# Patient Record
Sex: FEMALE | Race: WHITE | NOT HISPANIC OR LATINO
[De-identification: names, ages, dates, MRNs, and addresses within clinical notes are randomized per-mention and may not be internally consistent; named-entity substitution may affect disease eponyms.]

---

## 2017-02-14 ENCOUNTER — APPOINTMENT (OUTPATIENT)
Dept: OBGYN | Facility: CLINIC | Age: 72
End: 2017-02-14

## 2017-03-20 ENCOUNTER — APPOINTMENT (OUTPATIENT)
Dept: SURGERY | Facility: CLINIC | Age: 72
End: 2017-03-20

## 2017-04-11 ENCOUNTER — OUTPATIENT (OUTPATIENT)
Dept: OUTPATIENT SERVICES | Facility: HOSPITAL | Age: 72
LOS: 1 days | End: 2017-04-11
Payer: MEDICARE

## 2017-04-11 VITALS
WEIGHT: 130.07 LBS | HEART RATE: 64 BPM | RESPIRATION RATE: 15 BRPM | OXYGEN SATURATION: 100 % | TEMPERATURE: 98 F | SYSTOLIC BLOOD PRESSURE: 144 MMHG | DIASTOLIC BLOOD PRESSURE: 84 MMHG | HEIGHT: 64 IN

## 2017-04-11 DIAGNOSIS — Z96.659 PRESENCE OF UNSPECIFIED ARTIFICIAL KNEE JOINT: Chronic | ICD-10-CM

## 2017-04-11 DIAGNOSIS — Z98.891 HISTORY OF UTERINE SCAR FROM PREVIOUS SURGERY: Chronic | ICD-10-CM

## 2017-04-11 DIAGNOSIS — Z98.49 CATARACT EXTRACTION STATUS, UNSPECIFIED EYE: Chronic | ICD-10-CM

## 2017-04-11 DIAGNOSIS — N94.9 UNSPECIFIED CONDITION ASSOCIATED WITH FEMALE GENITAL ORGANS AND MENSTRUAL CYCLE: ICD-10-CM

## 2017-04-11 DIAGNOSIS — Z01.818 ENCOUNTER FOR OTHER PREPROCEDURAL EXAMINATION: ICD-10-CM

## 2017-04-11 DIAGNOSIS — N83.209 UNSPECIFIED OVARIAN CYST, UNSPECIFIED SIDE: ICD-10-CM

## 2017-04-11 DIAGNOSIS — N84.0 POLYP OF CORPUS UTERI: ICD-10-CM

## 2017-04-11 DIAGNOSIS — Z98.890 OTHER SPECIFIED POSTPROCEDURAL STATES: Chronic | ICD-10-CM

## 2017-04-11 DIAGNOSIS — Z90.721 ACQUIRED ABSENCE OF OVARIES, UNILATERAL: Chronic | ICD-10-CM

## 2017-04-11 DIAGNOSIS — Z86.39 PERSONAL HISTORY OF OTHER ENDOCRINE, NUTRITIONAL AND METABOLIC DISEASE: Chronic | ICD-10-CM

## 2017-04-11 LAB
BLD GP AB SCN SERPL QL: NEGATIVE — SIGNIFICANT CHANGE UP
HCT VFR BLD CALC: 39.4 % — SIGNIFICANT CHANGE UP (ref 34.5–45)
HGB BLD-MCNC: 13.3 G/DL — SIGNIFICANT CHANGE UP (ref 11.5–15.5)
MCHC RBC-ENTMCNC: 29.3 PG — SIGNIFICANT CHANGE UP (ref 27–34)
MCHC RBC-ENTMCNC: 33.8 GM/DL — SIGNIFICANT CHANGE UP (ref 32–36)
MCV RBC AUTO: 86.8 FL — SIGNIFICANT CHANGE UP (ref 80–100)
PLATELET # BLD AUTO: 192 K/UL — SIGNIFICANT CHANGE UP (ref 150–400)
RBC # BLD: 4.54 M/UL — SIGNIFICANT CHANGE UP (ref 3.8–5.2)
RBC # FLD: 12.9 % — SIGNIFICANT CHANGE UP (ref 10.3–14.5)
RH IG SCN BLD-IMP: POSITIVE — SIGNIFICANT CHANGE UP
WBC # BLD: 3.5 K/UL — LOW (ref 3.8–10.5)
WBC # FLD AUTO: 3.5 K/UL — LOW (ref 3.8–10.5)

## 2017-04-11 PROCEDURE — G0463: CPT

## 2017-04-11 PROCEDURE — 85027 COMPLETE CBC AUTOMATED: CPT

## 2017-04-11 PROCEDURE — 86901 BLOOD TYPING SEROLOGIC RH(D): CPT

## 2017-04-11 PROCEDURE — 86850 RBC ANTIBODY SCREEN: CPT

## 2017-04-11 PROCEDURE — 86900 BLOOD TYPING SEROLOGIC ABO: CPT

## 2017-04-11 NOTE — H&P PST ADULT - ATTENDING COMMENTS
pt scheduled for laparoscopic left salpingo oophorectomy, D&C, diagnostic hysterectomy, polypectomy, possible surgical staging, possible exploratory laparotomy.

## 2017-04-11 NOTE — H&P PST ADULT - PROBLEM SELECTOR PLAN 1
Scheduled laparoscopic left salpingo oophorectomy, D&C, diagnostic hysterectomy, polypectomy, possible surgical staging, possible exploratory laparotomy.   Pre-emptive analgesia ordered

## 2017-04-11 NOTE — H&P PST ADULT - PSH
H/O thyroid nodule  s/p excision 1996  S/P cataract extraction  b/l  S/P hernia repair  years ago; b/l inguinal  S/P knee replacement  left 2014  S/P oophorectomy  right, in her 20s H/O thyroid nodule  s/p excision   S/P     S/P cataract extraction  b/l  S/P hernia repair  years ago; b/l inguinal  S/P knee replacement  left   S/P oophorectomy  right, in her 20s

## 2017-04-11 NOTE — H&P PST ADULT - HISTORY OF PRESENT ILLNESS
72 y/o female with h/o right ovarian cyst s/p right oophorectomy in her 20s. Pt states she now has a left ovarian cyst and a polyp which has recently grown in size and is experiencing abdominal bloating but no pain. Presents for laparoscopic left salpingo oophorectomy, D&C, diagnostic hysterectomy, polypectomy, possible surgical staging, possible exploratory laparotomy.

## 2017-04-11 NOTE — H&P PST ADULT - NSANTHOSAYNRD_GEN_A_CORE
No. JAMESON screening performed.  STOP BANG Legend: 0-2 = LOW Risk; 3-4 = INTERMEDIATE Risk; 5-8 = HIGH Risk/13 in

## 2017-04-11 NOTE — H&P PST ADULT - PMH
Adnexal cyst  b/l  Fall  s/p fall in 2013 with neck, pain and knee injury  Uterine polyp Adnexal cyst  b/l  Fall  s/p fall in 2013 with neck, pain and knee injury  Thyroid nodule    Uterine polyp Adnexal cyst  b/l  Anemia    Fall  s/p fall in 2013 with neck, pain and knee injury  Thyroid nodule    Uterine polyp

## 2017-04-20 ENCOUNTER — RESULT REVIEW (OUTPATIENT)
Age: 72
End: 2017-04-20

## 2017-04-21 ENCOUNTER — TRANSCRIPTION ENCOUNTER (OUTPATIENT)
Age: 72
End: 2017-04-21

## 2017-04-21 ENCOUNTER — OUTPATIENT (OUTPATIENT)
Dept: OUTPATIENT SERVICES | Facility: HOSPITAL | Age: 72
LOS: 1 days | End: 2017-04-21
Payer: MEDICARE

## 2017-04-21 ENCOUNTER — APPOINTMENT (OUTPATIENT)
Dept: OBGYN | Facility: HOSPITAL | Age: 72
End: 2017-04-21

## 2017-04-21 VITALS
HEART RATE: 58 BPM | OXYGEN SATURATION: 97 % | DIASTOLIC BLOOD PRESSURE: 60 MMHG | TEMPERATURE: 98 F | SYSTOLIC BLOOD PRESSURE: 135 MMHG | RESPIRATION RATE: 16 BRPM

## 2017-04-21 VITALS
SYSTOLIC BLOOD PRESSURE: 150 MMHG | RESPIRATION RATE: 15 BRPM | TEMPERATURE: 98 F | HEART RATE: 91 BPM | DIASTOLIC BLOOD PRESSURE: 68 MMHG | OXYGEN SATURATION: 100 %

## 2017-04-21 DIAGNOSIS — Z86.39 PERSONAL HISTORY OF OTHER ENDOCRINE, NUTRITIONAL AND METABOLIC DISEASE: Chronic | ICD-10-CM

## 2017-04-21 DIAGNOSIS — N83.209 UNSPECIFIED OVARIAN CYST, UNSPECIFIED SIDE: ICD-10-CM

## 2017-04-21 DIAGNOSIS — Z96.659 PRESENCE OF UNSPECIFIED ARTIFICIAL KNEE JOINT: Chronic | ICD-10-CM

## 2017-04-21 DIAGNOSIS — Z98.49 CATARACT EXTRACTION STATUS, UNSPECIFIED EYE: Chronic | ICD-10-CM

## 2017-04-21 DIAGNOSIS — Z98.890 OTHER SPECIFIED POSTPROCEDURAL STATES: Chronic | ICD-10-CM

## 2017-04-21 DIAGNOSIS — N84.0 POLYP OF CORPUS UTERI: ICD-10-CM

## 2017-04-21 DIAGNOSIS — Z98.891 HISTORY OF UTERINE SCAR FROM PREVIOUS SURGERY: Chronic | ICD-10-CM

## 2017-04-21 DIAGNOSIS — Z90.721 ACQUIRED ABSENCE OF OVARIES, UNILATERAL: Chronic | ICD-10-CM

## 2017-04-21 LAB — RH IG SCN BLD-IMP: POSITIVE — SIGNIFICANT CHANGE UP

## 2017-04-21 PROCEDURE — 88307 TISSUE EXAM BY PATHOLOGIST: CPT

## 2017-04-21 PROCEDURE — 58558 HYSTEROSCOPY BIOPSY: CPT

## 2017-04-21 PROCEDURE — 58661 LAPAROSCOPY REMOVE ADNEXA: CPT

## 2017-04-21 PROCEDURE — 88307 TISSUE EXAM BY PATHOLOGIST: CPT | Mod: 26

## 2017-04-21 PROCEDURE — 88112 CYTOPATH CELL ENHANCE TECH: CPT | Mod: 26

## 2017-04-21 PROCEDURE — 88331 PATH CONSLTJ SURG 1 BLK 1SPC: CPT

## 2017-04-21 PROCEDURE — 88305 TISSUE EXAM BY PATHOLOGIST: CPT

## 2017-04-21 PROCEDURE — 88112 CYTOPATH CELL ENHANCE TECH: CPT

## 2017-04-21 PROCEDURE — 58661 LAPAROSCOPY REMOVE ADNEXA: CPT | Mod: 80

## 2017-04-21 PROCEDURE — 88331 PATH CONSLTJ SURG 1 BLK 1SPC: CPT | Mod: 26

## 2017-04-21 PROCEDURE — 86901 BLOOD TYPING SEROLOGIC RH(D): CPT

## 2017-04-21 PROCEDURE — C1889: CPT

## 2017-04-21 PROCEDURE — 88305 TISSUE EXAM BY PATHOLOGIST: CPT | Mod: 26

## 2017-04-21 PROCEDURE — 86900 BLOOD TYPING SEROLOGIC ABO: CPT

## 2017-04-21 RX ORDER — SODIUM CHLORIDE 9 MG/ML
3 INJECTION INTRAMUSCULAR; INTRAVENOUS; SUBCUTANEOUS EVERY 8 HOURS
Qty: 0 | Refills: 0 | Status: DISCONTINUED | OUTPATIENT
Start: 2017-04-21 | End: 2017-04-21

## 2017-04-21 RX ORDER — HYDROMORPHONE HYDROCHLORIDE 2 MG/ML
1 INJECTION INTRAMUSCULAR; INTRAVENOUS; SUBCUTANEOUS
Qty: 0 | Refills: 0 | Status: DISCONTINUED | OUTPATIENT
Start: 2017-04-21 | End: 2017-04-21

## 2017-04-21 RX ORDER — SODIUM CHLORIDE 9 MG/ML
1000 INJECTION, SOLUTION INTRAVENOUS
Qty: 0 | Refills: 0 | Status: DISCONTINUED | OUTPATIENT
Start: 2017-04-21 | End: 2017-05-06

## 2017-04-21 RX ORDER — OXYCODONE HYDROCHLORIDE 5 MG/1
1 TABLET ORAL
Qty: 20 | Refills: 0 | OUTPATIENT
Start: 2017-04-21 | End: 2017-04-26

## 2017-04-21 RX ORDER — ACETAMINOPHEN 500 MG
975 TABLET ORAL ONCE
Qty: 0 | Refills: 0 | Status: COMPLETED | OUTPATIENT
Start: 2017-04-21 | End: 2017-04-21

## 2017-04-21 RX ORDER — ACETAMINOPHEN 500 MG
650 TABLET ORAL EVERY 6 HOURS
Qty: 0 | Refills: 0 | Status: DISCONTINUED | OUTPATIENT
Start: 2017-04-21 | End: 2017-05-06

## 2017-04-21 RX ORDER — ONDANSETRON 8 MG/1
4 TABLET, FILM COATED ORAL ONCE
Qty: 0 | Refills: 0 | Status: DISCONTINUED | OUTPATIENT
Start: 2017-04-21 | End: 2017-04-21

## 2017-04-21 RX ORDER — CIPROFLOXACIN LACTATE 400MG/40ML
400 VIAL (ML) INTRAVENOUS ONCE
Qty: 0 | Refills: 0 | Status: DISCONTINUED | OUTPATIENT
Start: 2017-04-21 | End: 2017-04-21

## 2017-04-21 RX ORDER — IBUPROFEN 200 MG
1 TABLET ORAL
Qty: 0 | Refills: 0 | COMMUNITY
Start: 2017-04-21

## 2017-04-21 RX ORDER — FAMOTIDINE 10 MG/ML
20 INJECTION INTRAVENOUS ONCE
Qty: 0 | Refills: 0 | Status: COMPLETED | OUTPATIENT
Start: 2017-04-21 | End: 2017-04-21

## 2017-04-21 RX ORDER — OXYCODONE HYDROCHLORIDE 5 MG/1
5 TABLET ORAL EVERY 4 HOURS
Qty: 0 | Refills: 0 | Status: DISCONTINUED | OUTPATIENT
Start: 2017-04-21 | End: 2017-04-21

## 2017-04-21 RX ORDER — CELECOXIB 200 MG/1
200 CAPSULE ORAL ONCE
Qty: 0 | Refills: 0 | Status: COMPLETED | OUTPATIENT
Start: 2017-04-21 | End: 2017-04-21

## 2017-04-21 RX ORDER — TRAMADOL HYDROCHLORIDE 50 MG/1
50 TABLET ORAL ONCE
Qty: 0 | Refills: 0 | Status: DISCONTINUED | OUTPATIENT
Start: 2017-04-21 | End: 2017-04-21

## 2017-04-21 RX ORDER — HYDROMORPHONE HYDROCHLORIDE 2 MG/ML
0.5 INJECTION INTRAMUSCULAR; INTRAVENOUS; SUBCUTANEOUS
Qty: 0 | Refills: 0 | Status: DISCONTINUED | OUTPATIENT
Start: 2017-04-21 | End: 2017-04-21

## 2017-04-21 RX ORDER — SIMETHICONE 80 MG/1
80 TABLET, CHEWABLE ORAL THREE TIMES A DAY
Qty: 0 | Refills: 0 | Status: DISCONTINUED | OUTPATIENT
Start: 2017-04-21 | End: 2017-05-06

## 2017-04-21 RX ORDER — IBUPROFEN 200 MG
600 TABLET ORAL EVERY 6 HOURS
Qty: 0 | Refills: 0 | Status: DISCONTINUED | OUTPATIENT
Start: 2017-04-21 | End: 2017-05-06

## 2017-04-21 RX ADMIN — SODIUM CHLORIDE 100 MILLILITER(S): 9 INJECTION, SOLUTION INTRAVENOUS at 11:19

## 2017-04-21 RX ADMIN — Medication 975 MILLIGRAM(S): at 07:08

## 2017-04-21 RX ADMIN — TRAMADOL HYDROCHLORIDE 50 MILLIGRAM(S): 50 TABLET ORAL at 07:09

## 2017-04-21 RX ADMIN — HYDROMORPHONE HYDROCHLORIDE 1 MILLIGRAM(S): 2 INJECTION INTRAMUSCULAR; INTRAVENOUS; SUBCUTANEOUS at 11:00

## 2017-04-21 RX ADMIN — CELECOXIB 200 MILLIGRAM(S): 200 CAPSULE ORAL at 07:09

## 2017-04-21 RX ADMIN — FAMOTIDINE 20 MILLIGRAM(S): 10 INJECTION INTRAVENOUS at 07:09

## 2017-04-21 RX ADMIN — HYDROMORPHONE HYDROCHLORIDE 1 MILLIGRAM(S): 2 INJECTION INTRAMUSCULAR; INTRAVENOUS; SUBCUTANEOUS at 11:15

## 2017-04-21 NOTE — BRIEF OPERATIVE NOTE - PROCEDURE
Dilation and curettage  04/21/2017    Active  AURA  Hysteroscopy with polypectomy of uterus  04/21/2017    Active  NBFRANCY  Salpingectomy, oophorectomy, or salpingo-oophorectomy, laparoscopic  04/21/2017    Active  VIVIANAO

## 2017-04-21 NOTE — BRIEF OPERATIVE NOTE - POST-OP DX
Cyst of left ovary  04/21/2017    Active  Yvonne Drummond  Polyp, uterus corpus  04/21/2017    Active  Yvonne Drummond

## 2017-04-21 NOTE — BRIEF OPERATIVE NOTE - OPERATION/FINDINGS
8cm left ovarian cyst.  right ovary not visualized.  grossly normal appearing uterus, bilateral fallopian tubes, liver, gallbladder.  Two 1 cm endometrial polyps on the lower posterior and lower anterior uterine wall.  normal ostia.  otherwise atrophic endomtrium. normal cervix.

## 2017-04-24 LAB — NON-GYNECOLOGICAL CYTOLOGY STUDY: SIGNIFICANT CHANGE UP

## 2017-04-27 LAB — SURGICAL PATHOLOGY STUDY: SIGNIFICANT CHANGE UP

## 2017-05-02 ENCOUNTER — APPOINTMENT (OUTPATIENT)
Dept: OBGYN | Facility: CLINIC | Age: 72
End: 2017-05-02

## 2017-05-31 ENCOUNTER — APPOINTMENT (OUTPATIENT)
Dept: OBGYN | Facility: CLINIC | Age: 72
End: 2017-05-31

## 2017-09-27 ENCOUNTER — APPOINTMENT (OUTPATIENT)
Dept: SURGERY | Facility: CLINIC | Age: 72
End: 2017-09-27
Payer: MEDICARE

## 2017-09-27 PROCEDURE — 99213 OFFICE O/P EST LOW 20 MIN: CPT

## 2018-03-14 ENCOUNTER — APPOINTMENT (OUTPATIENT)
Dept: SURGERY | Facility: CLINIC | Age: 73
End: 2018-03-14
Payer: MEDICARE

## 2018-03-14 PROCEDURE — 99213 OFFICE O/P EST LOW 20 MIN: CPT

## 2018-04-11 ENCOUNTER — APPOINTMENT (OUTPATIENT)
Dept: GASTROENTEROLOGY | Facility: CLINIC | Age: 73
End: 2018-04-11

## 2018-04-11 ENCOUNTER — INPATIENT (INPATIENT)
Facility: HOSPITAL | Age: 73
LOS: 1 days | Discharge: HOME | End: 2018-04-13
Attending: INTERNAL MEDICINE | Admitting: INTERNAL MEDICINE
Payer: MEDICARE

## 2018-04-11 VITALS
TEMPERATURE: 97 F | OXYGEN SATURATION: 100 % | SYSTOLIC BLOOD PRESSURE: 157 MMHG | DIASTOLIC BLOOD PRESSURE: 73 MMHG | RESPIRATION RATE: 18 BRPM | HEART RATE: 62 BPM

## 2018-04-11 DIAGNOSIS — Z96.659 PRESENCE OF UNSPECIFIED ARTIFICIAL KNEE JOINT: Chronic | ICD-10-CM

## 2018-04-11 DIAGNOSIS — Z98.49 CATARACT EXTRACTION STATUS, UNSPECIFIED EYE: Chronic | ICD-10-CM

## 2018-04-11 DIAGNOSIS — Z98.891 HISTORY OF UTERINE SCAR FROM PREVIOUS SURGERY: Chronic | ICD-10-CM

## 2018-04-11 DIAGNOSIS — Z86.39 PERSONAL HISTORY OF OTHER ENDOCRINE, NUTRITIONAL AND METABOLIC DISEASE: Chronic | ICD-10-CM

## 2018-04-11 DIAGNOSIS — Z90.721 ACQUIRED ABSENCE OF OVARIES, UNILATERAL: Chronic | ICD-10-CM

## 2018-04-11 DIAGNOSIS — I63.9 CEREBRAL INFARCTION, UNSPECIFIED: ICD-10-CM

## 2018-04-11 DIAGNOSIS — Z98.890 OTHER SPECIFIED POSTPROCEDURAL STATES: Chronic | ICD-10-CM

## 2018-04-11 LAB
ALBUMIN SERPL ELPH-MCNC: 4.8 G/DL — SIGNIFICANT CHANGE UP (ref 3.3–5)
ALP SERPL-CCNC: 81 U/L — SIGNIFICANT CHANGE UP (ref 40–120)
ALT FLD-CCNC: 18 U/L — SIGNIFICANT CHANGE UP (ref 4–33)
APTT BLD: 32.5 SEC — SIGNIFICANT CHANGE UP (ref 27.5–37.4)
AST SERPL-CCNC: 19 U/L — SIGNIFICANT CHANGE UP (ref 4–32)
BASE EXCESS BLDV CALC-SCNC: 4.1 MMOL/L — SIGNIFICANT CHANGE UP
BASOPHILS # BLD AUTO: 0.02 K/UL — SIGNIFICANT CHANGE UP (ref 0–0.2)
BASOPHILS NFR BLD AUTO: 0.4 % — SIGNIFICANT CHANGE UP (ref 0–2)
BILIRUB SERPL-MCNC: 0.3 MG/DL — SIGNIFICANT CHANGE UP (ref 0.2–1.2)
BLOOD GAS VENOUS - CREATININE: 0.64 MG/DL — SIGNIFICANT CHANGE UP (ref 0.5–1.3)
BUN SERPL-MCNC: 19 MG/DL — SIGNIFICANT CHANGE UP (ref 7–23)
CALCIUM SERPL-MCNC: 9.3 MG/DL — SIGNIFICANT CHANGE UP (ref 8.4–10.5)
CHLORIDE BLDV-SCNC: 110 MMOL/L — HIGH (ref 96–108)
CHLORIDE SERPL-SCNC: 104 MMOL/L — SIGNIFICANT CHANGE UP (ref 98–107)
CO2 SERPL-SCNC: 27 MMOL/L — SIGNIFICANT CHANGE UP (ref 22–31)
CREAT SERPL-MCNC: 0.67 MG/DL — SIGNIFICANT CHANGE UP (ref 0.5–1.3)
EOSINOPHIL # BLD AUTO: 0.06 K/UL — SIGNIFICANT CHANGE UP (ref 0–0.5)
EOSINOPHIL NFR BLD AUTO: 1.1 % — SIGNIFICANT CHANGE UP (ref 0–6)
GAS PNL BLDV: 139 MMOL/L — SIGNIFICANT CHANGE UP (ref 136–146)
GLUCOSE BLDV-MCNC: 103 — HIGH (ref 70–99)
GLUCOSE SERPL-MCNC: 92 MG/DL — SIGNIFICANT CHANGE UP (ref 70–99)
HCO3 BLDV-SCNC: 27 MMOL/L — SIGNIFICANT CHANGE UP (ref 20–27)
HCT VFR BLD CALC: 46.7 % — HIGH (ref 34.5–45)
HCT VFR BLDV CALC: 45.2 % — HIGH (ref 34.5–45)
HGB BLD-MCNC: 15.4 G/DL — SIGNIFICANT CHANGE UP (ref 11.5–15.5)
HGB BLDV-MCNC: 14.8 G/DL — SIGNIFICANT CHANGE UP (ref 11.5–15.5)
IMM GRANULOCYTES # BLD AUTO: 0.01 # — SIGNIFICANT CHANGE UP
IMM GRANULOCYTES NFR BLD AUTO: 0.2 % — SIGNIFICANT CHANGE UP (ref 0–1.5)
INR BLD: 0.94 — SIGNIFICANT CHANGE UP (ref 0.88–1.17)
LACTATE BLDV-MCNC: 1.1 MMOL/L — SIGNIFICANT CHANGE UP (ref 0.5–2)
LYMPHOCYTES # BLD AUTO: 1.06 K/UL — SIGNIFICANT CHANGE UP (ref 1–3.3)
LYMPHOCYTES # BLD AUTO: 19.1 % — SIGNIFICANT CHANGE UP (ref 13–44)
MCHC RBC-ENTMCNC: 29.3 PG — SIGNIFICANT CHANGE UP (ref 27–34)
MCHC RBC-ENTMCNC: 33 % — SIGNIFICANT CHANGE UP (ref 32–36)
MCV RBC AUTO: 89 FL — SIGNIFICANT CHANGE UP (ref 80–100)
MONOCYTES # BLD AUTO: 0.33 K/UL — SIGNIFICANT CHANGE UP (ref 0–0.9)
MONOCYTES NFR BLD AUTO: 5.9 % — SIGNIFICANT CHANGE UP (ref 2–14)
NEUTROPHILS # BLD AUTO: 4.08 K/UL — SIGNIFICANT CHANGE UP (ref 1.8–7.4)
NEUTROPHILS NFR BLD AUTO: 73.3 % — SIGNIFICANT CHANGE UP (ref 43–77)
NRBC # FLD: 0 — SIGNIFICANT CHANGE UP
PCO2 BLDV: 47 MMHG — SIGNIFICANT CHANGE UP (ref 41–51)
PH BLDV: 7.4 PH — SIGNIFICANT CHANGE UP (ref 7.32–7.43)
PLATELET # BLD AUTO: 207 K/UL — SIGNIFICANT CHANGE UP (ref 150–400)
PMV BLD: 10.2 FL — SIGNIFICANT CHANGE UP (ref 7–13)
PO2 BLDV: 44 MMHG — HIGH (ref 35–40)
POTASSIUM BLDV-SCNC: 3.6 MMOL/L — SIGNIFICANT CHANGE UP (ref 3.4–4.5)
POTASSIUM SERPL-MCNC: 3.8 MMOL/L — SIGNIFICANT CHANGE UP (ref 3.5–5.3)
POTASSIUM SERPL-SCNC: 3.8 MMOL/L — SIGNIFICANT CHANGE UP (ref 3.5–5.3)
PROT SERPL-MCNC: 7.8 G/DL — SIGNIFICANT CHANGE UP (ref 6–8.3)
PROTHROM AB SERPL-ACNC: 10.8 SEC — SIGNIFICANT CHANGE UP (ref 9.8–13.1)
RBC # BLD: 5.25 M/UL — HIGH (ref 3.8–5.2)
RBC # FLD: 12.5 % — SIGNIFICANT CHANGE UP (ref 10.3–14.5)
SAO2 % BLDV: 75.9 % — SIGNIFICANT CHANGE UP (ref 60–85)
SODIUM SERPL-SCNC: 144 MMOL/L — SIGNIFICANT CHANGE UP (ref 135–145)
TROPONIN T SERPL-MCNC: < 0.06 NG/ML — SIGNIFICANT CHANGE UP (ref 0–0.06)
TSH SERPL-MCNC: 4.03 UIU/ML — SIGNIFICANT CHANGE UP (ref 0.27–4.2)
WBC # BLD: 5.56 K/UL — SIGNIFICANT CHANGE UP (ref 3.8–10.5)
WBC # FLD AUTO: 5.56 K/UL — SIGNIFICANT CHANGE UP (ref 3.8–10.5)

## 2018-04-11 PROCEDURE — 70496 CT ANGIOGRAPHY HEAD: CPT | Mod: 26

## 2018-04-11 PROCEDURE — 70498 CT ANGIOGRAPHY NECK: CPT | Mod: 26

## 2018-04-11 PROCEDURE — 70450 CT HEAD/BRAIN W/O DYE: CPT | Mod: 26,59

## 2018-04-11 PROCEDURE — 99291 CRITICAL CARE FIRST HOUR: CPT

## 2018-04-11 RX ORDER — ALTEPLASE 100 MG
5.5 KIT INTRAVENOUS ONCE
Qty: 0 | Refills: 0 | Status: COMPLETED | OUTPATIENT
Start: 2018-04-11 | End: 2018-04-11

## 2018-04-11 RX ORDER — ACETAMINOPHEN 500 MG
650 TABLET ORAL
Qty: 0 | Refills: 0 | COMMUNITY

## 2018-04-11 RX ORDER — ALTEPLASE 100 MG
49.5 KIT INTRAVENOUS ONCE
Qty: 0 | Refills: 0 | Status: COMPLETED | OUTPATIENT
Start: 2018-04-11 | End: 2018-04-11

## 2018-04-11 RX ORDER — ATORVASTATIN CALCIUM 80 MG/1
80 TABLET, FILM COATED ORAL AT BEDTIME
Qty: 0 | Refills: 0 | Status: DISCONTINUED | OUTPATIENT
Start: 2018-04-11 | End: 2018-04-13

## 2018-04-11 RX ORDER — CHOLECALCIFEROL (VITAMIN D3) 125 MCG
1000 CAPSULE ORAL DAILY
Qty: 0 | Refills: 0 | Status: DISCONTINUED | OUTPATIENT
Start: 2018-04-11 | End: 2018-04-13

## 2018-04-11 RX ORDER — INFLUENZA VIRUS VACCINE 15; 15; 15; 15 UG/.5ML; UG/.5ML; UG/.5ML; UG/.5ML
0.5 SUSPENSION INTRAMUSCULAR ONCE
Qty: 0 | Refills: 0 | Status: DISCONTINUED | OUTPATIENT
Start: 2018-04-11 | End: 2018-04-13

## 2018-04-11 RX ADMIN — ALTEPLASE 330 MILLIGRAM(S): KIT at 12:15

## 2018-04-11 RX ADMIN — ALTEPLASE 49.5 MILLIGRAM(S): KIT at 12:17

## 2018-04-11 NOTE — ED ADULT TRIAGE NOTE - CHIEF COMPLAINT QUOTE
Pt had an episode of amnesia since 9:30am--pt cant recall anything after 9:30. Pt moving all extremetes --pt doesn't know month or day  Pt evaluated by Dr Chambers--stroke code called

## 2018-04-11 NOTE — H&P ADULT - NEGATIVE GASTROINTESTINAL SYMPTOMS
no constipation/no abdominal pain/no change in bowel habits/no flatulence/no nausea/no vomiting/no diarrhea

## 2018-04-11 NOTE — H&P ADULT - ASSESSMENT
72 F with no significant PMH presented to ED with c/o of acute onset of memory problem, w possible ischemic stroke    Neuro:  -pt had acute onset of memory loss. Per joshua, pt had pronator drift; concerned for ischemic stroke (CT head negative for hemorrhagic stroke)  -s/p tpa at 12:15 pm  -neuro q1h for 24 h s/p tpa  CTA head and neck   -repeat CT head 24 post tpa  -asa and DVT ppx 24 hr post stroke  -on atorvastain 80mg  -will obtain HbgA1c, lipid profile  -will also access for other etiologies of memory loss, TSH, syphilis HIV, folate, B12  -currently neurologically intact     Respiratory  -no issues    Cardiovascular  -on atorvastain 80mg for stroke    GI  -currently will be NPO 6 hours post tpa, then will access for a bedside dysphagia screen, and if no abnormalities, will start diet    ID  -no issues    Heme  -no issues    Endo  -will screen for diabetes w HbgA1c in setting of possible stroke    Skin  -no issues    DVT ppx  -currently on compression strokings until CT head 24 hours tpa is confirmed to be negative for intracranial hemorrhage     Joseph Prater, PGY3  [id number] 72 F with no significant PMH presented to ED with c/o of acute onset of memory problem, w possible ischemic stroke    Neuro:  -pt had acute onset of memory loss. Per joshua, pt had pronator drift; concerned for ischemic stroke (CT head negative for hemorrhagic stroke)  -s/p tpa at 12:15 pm  -neuro q1h for 24 h s/p tpa  CTA head and neck   -repeat CT head 24 post tpa  -will have to access if TKR of L knee is compatible w MRI   -asa and DVT ppx 24 hr post stroke  -on atorvastain 80mg  -will obtain HbgA1c, lipid profile  -will also access for other etiologies of memory loss, TSH, syphilis HIV, folate, B12  -currently neurologically intact     Respiratory  -no issues    Cardiovascular  -permissive HTN of <180/105 given possible stroke   -on atorvastain 80mg for stroke  -TTE w bubble study for stroke eval     GI  -currently will be NPO 6 hours post tpa, then will access for a bedside dysphagia screen, and if no abnormalities, will start diet    ID  -no issues    Heme  -no issues    Endo  -will screen for diabetes w HbgA1c in setting of possible stroke    Skin  -no issues    DVT ppx  -currently on compression strokings until CT head 24 hours tpa is confirmed to be negative for intracranial hemorrhage     Joseph Prater, PGY3  [id number] 72 F with no significant PMH presented to ED with c/o of acute onset of memory problem, w possible ischemic stroke    Neuro:  -pt had acute onset of memory loss. Per joshua, pt had pronator drift; concerned for ischemic stroke (CT head negative for hemorrhagic stroke)  -s/p tpa at 12:15 pm  -neuro q1h for 24 h s/p tpa  CTA head and neck   -repeat CT head 24 post tpa  -will have to access if TKR of L knee is compatible w MRI   -asa and DVT ppx 24 hr post stroke  -on atorvastain 80mg  -will obtain HbgA1c, lipid profile  -will also access for other etiologies of memory loss, TSH, syphilis HIV, folate, B12  -currently neurologically intact     Respiratory  -no issues    Cardiovascular  -permissive HTN of <180/105 given possible stroke   -on atorvastain 80mg for stroke  -TTE w bubble study for stroke eval     GI  -currently will be NPO 12 hours post tpa, then will access for a bedside dysphagia screen, and if no abnormalities, will start diet    ID  -no issues    Heme  -no issues    Endo  -will screen for diabetes w HbgA1c in setting of possible stroke    Skin  -no issues    DVT ppx  -currently on compression strokings until CT head 24 hours tpa is confirmed to be negative for intracranial hemorrhage     Joseph Prater, PGY3  [id number]

## 2018-04-11 NOTE — ED PROVIDER NOTE - PMH
Adnexal cyst  b/l  Anemia    Fall  s/p fall in 2013 with neck, pain and knee injury  Thyroid nodule    Uterine polyp

## 2018-04-11 NOTE — H&P ADULT - ATTENDING COMMENTS
Agree with above. Seen and examined with residents. Given TPA for possible stroke. Admit to MICU and watch closely with neuro checks.

## 2018-04-11 NOTE — H&P ADULT - NEGATIVE CARDIOVASCULAR SYMPTOMS
no peripheral edema/no orthopnea/no palpitations/no claudication/no chest pain/no paroxysmal nocturnal dyspnea/no dyspnea on exertion

## 2018-04-11 NOTE — ED ADULT NURSE NOTE - OBJECTIVE STATEMENT
Patient is a 72 year old female who presents with an episode of amnesia after 9:30 a.m. Pt is awake and alert. No c/o pain. Pt was having breakfast this morning when she couldn't remember what she had done the day prior. Her family brought her to the E.R. and a stroke code was called. CT scan was completed. 20g PIV x 2 placed in right and left A/C. Neurology at bedside evaluating for tPa administration.

## 2018-04-11 NOTE — H&P ADULT - HISTORY OF PRESENT ILLNESS
72 F with no significant PMH presented to ED with c/o of acute onset of memory problem. As per family member, her last seen normal was 9:30 am. Pt at her baseline unitl 9:30 am, then pt was asking about items that she could not remember were purchased yesterday. Pt also did not remember all of yesterday's event.  Family members denied any h/a, SOB, palpitations, fevers, chills, focal weakness, focal sensory loss. She was taken to the ED.     In ED, pt was evaluate by the stroke team, found to have slight pronator drift on R side, given tPa at 12:15 pm 72 F with pmhx of coronary spasms, HTN no longer tx, present with c/o of acute onset of memory problem. As per family member, her last seen normal was 9:30 am. Pt at her baseline unitl 9:30 am, then pt was asking about items that she could not remember were purchased yesterday. Pt also did not remember all of yesterday's event.  Family members denied any h/a, SOB, palpitations, fevers, chills, focal weakness, focal sensory loss. She was taken to the ED.     In ED, pt was evaluate by the stroke team, found to have slight pronator drift on R side, given tPa at 12:15 pm

## 2018-04-11 NOTE — ED PROVIDER NOTE - PSH
H/O thyroid nodule  s/p excision   S/P     S/P cataract extraction  b/l  S/P hernia repair  years ago; b/l inguinal  S/P knee replacement  left   S/P oophorectomy  right, in her 20s

## 2018-04-11 NOTE — ED PROVIDER NOTE - ATTENDING CONTRIBUTION TO CARE
ARIES Attending Note - Dr. Sanchez  73 yo female w/o pmhx bib for confusion associated with weakness.  PE: pt is alert and oriented x2, perrl, ent normal, membranes are moist, neck supple. no lymphadenopathy or thyroid enlargement, No JVD.  Chest clear to P&A, Heart- reg rhythm without murmur, rubs or gallops, radial pulses equal bilaterally.  Abd is soft, non-tender, Bowel sounds are active. no mass or organomegaly. : No CVA tenderness. Neuro:  Pt alert and oriented x 3. Perrl    Distal neurosensory is intact. Motor function is 5/5 strength bilaterally.  No focal deficits. Extremities:  No edema.  Skin: warm and dry.  Plan:  CT, labs with CBC, CMP, Neurology consult, CT scan of head   Impression:   CVA  Pt was given TPA.

## 2018-04-11 NOTE — CONSULT NOTE ADULT - ASSESSMENT
72  YF with no significant PMH presented to ED with c/o of acute onset of memory problem. Her neurological examination was remarkable for weakness in RUE and difficulty with making new memory. CT head was unremarkable.     Impression     acute stroke symptoms, s/p tPA     Plan     Permissive HTN x 24hrs goal 180/105  MRI brain w/o cont  CTA head and neck (would be done in ED before sending patient)   HgA1c  Lipid profile  Atorvastatin 80 mg   PT/OT   Repeat CT head after 24 hours  start aspirin and DVT prophylaxis only if repeat CT head is stable with no bleeding   TTE with bubble study   Telemetry monitoring  speech and swallow eval

## 2018-04-11 NOTE — ED PROVIDER NOTE - OBJECTIVE STATEMENT
73 yo female w/o pmhx bib for confusion. Per the boyfriend at bedside the patient woke up at 7am w/o complaints and had breakfast. around 9am the patient was asking the boyfriend of what she did yesterday as well as what she had for breakfast which is substantially abnormal for her. The patient at times does not remember exactly what she did yesterday but during our discussion has periods of memory. She denies any current weakness, headache, vision changes, dizziness, chest pain, abdominal pain, n/v/d. Currently takes vit d3 and used to take asa 81mg but has not recently. No other meds, supplements, etc.

## 2018-04-11 NOTE — H&P ADULT - NSHPLABSRESULTS_GEN_ALL_CORE
Labs nothing significant.    EKG had 1st degree heart block, DC in the 270s, and left axis deviation

## 2018-04-11 NOTE — CONSULT NOTE ADULT - ATTENDING COMMENTS
I have seen and examined this patient with the stroke neurology team.     History was reviewed with the patient and/or available family members.   ROS: All negative except documented in the HPI.   Neurological exam was performed and agree with exam as documented above.   Laboratory results and imaging studies were reviewed by me.   I agree with the neurology resident note as documented above.    71 Y/O R handed woman with vascular risk factor of age is seen in the [facility] for acute onset of memory disturbance. She was reported to be normal until 9:30 AM when she was having breakfast. Soon after that, she was noted to have memory difficulties described as remembering the events from yesterday and this morning. She was brought to [facility] for further evaluation. I have seen and examined this patient with the stroke neurology team.     History was reviewed with the patient and/or available family members.   ROS: All negative except documented in the HPI.   Neurological exam was performed and agree with exam as documented above.   Laboratory results and imaging studies were reviewed by me.   I agree with the neurology resident note as documented above.    73 Y/O R handed woman with vascular risk factor of age is seen in the [facility] for acute onset of memory disturbance. She was reported to be normal until 9:30 AM when she was having breakfast. Soon after that, she was noted to have memory difficulties described as remembering the events from yesterday and this morning. She was brought to [facility] for further evaluation. Neurological exam today shows memory loss involving anterograde as well as some retrograde, right nasolabial flattening and RUE mild weakness (RUE pronator drift, 5-/5). CT brain on admission showed mild to moderate leukoaraiosis but did not show any evidence of acute infarct or hemorrhage.     Impression:  Cerebral embolism with cerebral infarction. Left PCA distribution stroke likely leading to thalamic/thalamocapsular dysfunction – likely etiology being small vessel disease but possibility of cerebral embolism needs to be ruled out    Plan:   - Risks, benefits and adverse reactions associated with IV tPA including hemorrhagic stroke were discussed with patient and available family members in detail. After ruling out all the contraindications and obtaining verbal consent, patient would be treated with IV tPA  - Continue with  24 hours post IV tPA precautions including BP goal<185/110 mmHg before the tPA bolus administration and <180/105 mmHg after tPA bolus for first 24 hours followed by gradual normotension as per protocol  - Not a candidate for neurosurgical intervention due to low NIHSS   - CTA head and neck STAT to evaluate for intracranial and extracranial cerebral circulation  - MRI brain without contrast   - Aspirin and pharmacological DVT prophylaxis to be considered at 24 hours after the IV tPA based on repeating brain imaging, SCDs for DVT prophylaxis in the interim  - Atorvastatin 80 mg at bedtime after establishing enteral access or passing swallow evaluation; further dose titration as per LDL results  - HbA1C and LDL, continue with aggressive vascular risk factors modifications   - TTE with bubble study and telemetry to screen for possible cardiac source of embolism  - Prolonged cardiac monitoring to be considered based on results of above mentioned tests  - PT/OT/Speech and swallow evaluation     Above mentioned plan was discussed with patient and available family member in detail. All the questions were answered and concerns were addressed. More than 86 minutes were spent in evaluation and management of this critically ill and unstable patient with an acute stroke.

## 2018-04-11 NOTE — H&P ADULT - NEGATIVE NEUROLOGICAL SYMPTOMS
no focal seizures/no paresthesias/no facial palsy/no generalized seizures/no syncope/no tremors/no vertigo/no loss of sensation/no difficulty walking

## 2018-04-11 NOTE — ED ADULT NURSE REASSESSMENT NOTE - NS ED NURSE REASSESS COMMENT FT1
Mobile Critical Care RN at bedside. Dysphagia screen not done. As per M.D. order patient is NPO for the first 12 hours after tpa administration.

## 2018-04-11 NOTE — ED PROVIDER NOTE - MEDICAL DECISION MAKING DETAILS
71 yo female w/ concerns for possible CVA based on amnesic symptoms. appreciate right sided pronator drift, right mild loss of faciolabal fold and patient continues to maintain amnesic symptoms here (does not remember the neurologist after 20min discussion). EKG shows NSR at 69.   Discussed with neuro and family - decided to give TPA.

## 2018-04-12 ENCOUNTER — TRANSCRIPTION ENCOUNTER (OUTPATIENT)
Age: 73
End: 2018-04-12

## 2018-04-12 LAB
BUN SERPL-MCNC: 18 MG/DL — SIGNIFICANT CHANGE UP (ref 7–23)
CALCIUM SERPL-MCNC: 9 MG/DL — SIGNIFICANT CHANGE UP (ref 8.4–10.5)
CHLORIDE SERPL-SCNC: 103 MMOL/L — SIGNIFICANT CHANGE UP (ref 98–107)
CHOLEST SERPL-MCNC: 206 MG/DL — HIGH (ref 120–199)
CO2 SERPL-SCNC: 28 MMOL/L — SIGNIFICANT CHANGE UP (ref 22–31)
CREAT SERPL-MCNC: 0.67 MG/DL — SIGNIFICANT CHANGE UP (ref 0.5–1.3)
GLUCOSE SERPL-MCNC: 106 MG/DL — HIGH (ref 70–99)
HBA1C BLD-MCNC: 5.3 % — SIGNIFICANT CHANGE UP (ref 4–5.6)
HCT VFR BLD CALC: 42.2 % — SIGNIFICANT CHANGE UP (ref 34.5–45)
HDLC SERPL-MCNC: 53 MG/DL — SIGNIFICANT CHANGE UP (ref 45–65)
HGB BLD-MCNC: 13.7 G/DL — SIGNIFICANT CHANGE UP (ref 11.5–15.5)
LIPID PNL WITH DIRECT LDL SERPL: 140 MG/DL — SIGNIFICANT CHANGE UP
MAGNESIUM SERPL-MCNC: 2.2 MG/DL — SIGNIFICANT CHANGE UP (ref 1.6–2.6)
MCHC RBC-ENTMCNC: 29.2 PG — SIGNIFICANT CHANGE UP (ref 27–34)
MCHC RBC-ENTMCNC: 32.5 % — SIGNIFICANT CHANGE UP (ref 32–36)
MCV RBC AUTO: 90 FL — SIGNIFICANT CHANGE UP (ref 80–100)
NRBC # FLD: 0 — SIGNIFICANT CHANGE UP
PHOSPHATE SERPL-MCNC: 3 MG/DL — SIGNIFICANT CHANGE UP (ref 2.5–4.5)
PLATELET # BLD AUTO: 204 K/UL — SIGNIFICANT CHANGE UP (ref 150–400)
PMV BLD: 10.2 FL — SIGNIFICANT CHANGE UP (ref 7–13)
POTASSIUM SERPL-MCNC: 3.1 MMOL/L — LOW (ref 3.5–5.3)
POTASSIUM SERPL-SCNC: 3.1 MMOL/L — LOW (ref 3.5–5.3)
RBC # BLD: 4.69 M/UL — SIGNIFICANT CHANGE UP (ref 3.8–5.2)
RBC # FLD: 12.7 % — SIGNIFICANT CHANGE UP (ref 10.3–14.5)
SODIUM SERPL-SCNC: 142 MMOL/L — SIGNIFICANT CHANGE UP (ref 135–145)
TRIGL SERPL-MCNC: 70 MG/DL — SIGNIFICANT CHANGE UP (ref 10–149)
TSH SERPL-MCNC: 3.06 UIU/ML — SIGNIFICANT CHANGE UP (ref 0.27–4.2)
VIT B12 SERPL-MCNC: 372 PG/ML — SIGNIFICANT CHANGE UP (ref 200–900)
WBC # BLD: 4.24 K/UL — SIGNIFICANT CHANGE UP (ref 3.8–10.5)
WBC # FLD AUTO: 4.24 K/UL — SIGNIFICANT CHANGE UP (ref 3.8–10.5)

## 2018-04-12 PROCEDURE — 70450 CT HEAD/BRAIN W/O DYE: CPT | Mod: 26

## 2018-04-12 PROCEDURE — 99291 CRITICAL CARE FIRST HOUR: CPT

## 2018-04-12 RX ORDER — POTASSIUM CHLORIDE 20 MEQ
40 PACKET (EA) ORAL EVERY 4 HOURS
Qty: 0 | Refills: 0 | Status: DISCONTINUED | OUTPATIENT
Start: 2018-04-12 | End: 2018-04-12

## 2018-04-12 RX ORDER — ENOXAPARIN SODIUM 100 MG/ML
40 INJECTION SUBCUTANEOUS DAILY
Qty: 0 | Refills: 0 | Status: DISCONTINUED | OUTPATIENT
Start: 2018-04-12 | End: 2018-04-13

## 2018-04-12 RX ORDER — POTASSIUM CHLORIDE 20 MEQ
40 PACKET (EA) ORAL ONCE
Qty: 0 | Refills: 0 | Status: COMPLETED | OUTPATIENT
Start: 2018-04-12 | End: 2018-04-12

## 2018-04-12 RX ORDER — ASPIRIN/CALCIUM CARB/MAGNESIUM 324 MG
81 TABLET ORAL DAILY
Qty: 0 | Refills: 0 | Status: DISCONTINUED | OUTPATIENT
Start: 2018-04-12 | End: 2018-04-13

## 2018-04-12 RX ADMIN — ENOXAPARIN SODIUM 40 MILLIGRAM(S): 100 INJECTION SUBCUTANEOUS at 14:25

## 2018-04-12 RX ADMIN — Medication 1000 UNIT(S): at 12:11

## 2018-04-12 RX ADMIN — Medication 40 MILLIEQUIVALENT(S): at 05:33

## 2018-04-12 RX ADMIN — Medication 81 MILLIGRAM(S): at 14:25

## 2018-04-12 NOTE — DISCHARGE NOTE ADULT - HOSPITAL COURSE
72 F with pmhx of coronary spasms, HTN no longer tx, present with c/o of acute onset of memory problem. As per family member, her last seen normal was 9:30 am. Pt at her baseline unitl 9:30 am, then pt was asking about items that she could not remember were purchased yesterday. Pt also did not remember all of yesterday's event.  Family members denied any h/a, SOB, palpitations, fevers, chills, focal weakness, focal sensory loss. She was taken to the ED.     In ED, pt was evaluate by the stroke team, found to have slight pronator drift on R side, given tPa at 12:15 pm   CT with and without contrast normal  memory has returned to normal 72 F with pmhx of coronary spasms, HTN no longer tx, present with c/o of acute onset of memory problem. As per family member, her last seen normal was 9:30 am. Pt at her baseline unitl 9:30 am, then pt was asking about items that she could not remember were purchased yesterday. Pt also did not remember all of yesterday's event.  Family members denied any h/a, SOB, palpitations, fevers, chills, focal weakness, focal sensory loss. She was taken to the ED.     In ED, pt was evaluate by the stroke team, found to have slight pronator drift on R side, given tPa at 12:15 pm   CT with and without contrast normal and memory has returned to normal.  She was seen by private neurologist Dr. Hand who thought that the presentation was likely TGA.  MRI of brain without contrast was ordered but can be done as outpatient.  Will discuss with Dr. Batista if can DC 4/13 if MRI cannot be done and patient can have it outpatient. 72 F with pmhx of coronary spasms, HTN no longer tx, present with c/o of acute onset of memory problem. As per family member, her last seen normal was 9:30 am. Pt at her baseline unitl 9:30 am, then pt was asking about items that she could not remember were purchased yesterday. Pt also did not remember all of yesterday's event.  Family members denied any h/a, SOB, palpitations, fevers, chills, focal weakness, focal sensory loss. She was taken to the ED.     In ED, pt was evaluate by the stroke team, found to have slight pronator drift on R side, given tPa at 12:15 pm   CT with and without contrast normal and memory has returned to normal.  She was seen by private neurologist Dr. Hand who thought that the presentation was likely TGA.  MRI of brain without contrast was ordered but can be done as outpatient.   4/13 Echo: CONCLUSIONS:  1. Moderate concentric left ventricular hypertrophy.  2. Normal left ventricular systolic function. No segmental  wall motion abnormalities.  3. Mild diastolic dysfunction (Stage I).  4. Normal right ventricular size and function.  4/13 Treponema Pallidum NEG  4/13 MRI brain: IMPRESSION:    Moderate microvascular ischemic change.  No evidence of intracranial hemorrhage or acute cerebral ischemia.

## 2018-04-12 NOTE — PROGRESS NOTE ADULT - ASSESSMENT
73 Y/O R handed woman with vascular risk factor of age is seen in the [facility] for acute onset of memory disturbance. She was reported to be normal until 9:30 AM when she was having breakfast. Soon after that, she was noted to have memory difficulties described as remembering the events from yesterday and this morning. She was brought to [facility] for further evaluation. Initial neurological exam today shows memory loss involving anterograde as well as some retrograde, right nasolabial flattening and RUE mild weakness (RUE pronator drift, 5-/5). CT brain on admission showed mild to moderate leukoaraiosis but did not show any evidence of acute infarct or hemorrhage. She is s/p tPA on 4/11/18, in MICU for post-tPA protocol. Was not a candidate for neurosurgical intervention due to low NIHSS. Currently patient is full strength on the R side with a gap in her memory from time of event but remembers everything else now and possible very mild expressive aphasia.     Impression:  Cerebral embolism with cerebral infarction. Left PCA distribution stroke likely leading to thalamic/thalamocapsular dysfunction – likely etiology being small vessel disease but possibility of cerebral embolism needs to be ruled out    Plan:   - Continue with  24 hours post IV tPA precautions <180/105 mmHg after tPA bolus for first 24 hours followed by gradual normotension as per protocol  - CTA head and neck STAT to evaluate for intracranial and extracranial cerebral circulation  - MRI brain without contrast   - Aspirin and pharmacological DVT prophylaxis to be considered at 24 hours after the IV tPA based on repeating brain imaging, SCDs for DVT prophylaxis in the interim  -. Atorvastatin 80 mg at bedtime.  -A1c 5.3  - continue with aggressive vascular risk factors modifications   - TTE with bubble study and telemetry to screen for possible cardiac source of embolism  - Prolonged cardiac monitoring to be considered based on results of above mentioned tests  - PT/OT/Speech and swallow evaluation Assessment:  71 Y/O R handed woman with vascular risk factor of age is seen in the [facility] for acute onset of memory disturbance. She was reported to be normal until 9:30 AM when she was having breakfast. Soon after that, she was noted to have memory difficulties described as remembering the events from yesterday and this morning. She was brought to [facility] for further evaluation. Initial neurological exam today shows memory loss involving anterograde as well as some retrograde, right nasolabial flattening and RUE mild weakness (RUE pronator drift, 5-/5). CT brain on admission showed mild to moderate leukoaraiosis but did not show any evidence of acute infarct or hemorrhage. She is s/p tPA on 4/11/18, in MICU for post-tPA protocol. Was not a candidate for neurosurgical intervention due to low NIHSS. Currently patient is full strength on the R side with a gap in her memory from time of event but remembers everything else now and possible very subtle expressive aphasia.     Impression:  Cerebral embolism with cerebral infarction. Left PCA distribution stroke likely leading to thalamic/thalamocapsular dysfunction – likely etiology being small vessel disease but possibility of cerebral embolism needs to be ruled out    Plan:   - Continue with  24 hours post IV tPA precautions <180/105 mmHg after tPA bolus for first 24 hours followed by gradual normotension as per protocol  - MRI brain without contrast   - Aspirin and pharmacological DVT prophylaxis to be considered at 24 hours after the IV tPA based on repeating brain imaging, SCDs for DVT prophylaxis in the interim  -. Atorvastatin 80 mg at bedtime.  - A1c 5.3  - Continue with aggressive vascular risk factors modifications   - TTE with bubble study and telemetry to screen for possible cardiac source of embolism  - Prolonged cardiac monitoring to be considered based on results of above mentioned tests  - PT/OT/Speech and swallow evaluation     Above mentioned plan was discussed with patient and family member at bedside in detail. All the questions were answered and concerns were addressed.

## 2018-04-12 NOTE — PROGRESS NOTE ADULT - SUBJECTIVE AND OBJECTIVE BOX
CHIEF COMPLAINT:    Interval Events:    REVIEW OF SYSTEMS:  Constitutional: [ ] negative [ ] fevers [ ] chills [ ] weight loss [ ] weight gain  HEENT: [ ] negative [ ] dry eyes [ ] eye irritation [ ] postnasal drip [ ] nasal congestion  CV: [ ] negative  [ ] chest pain [ ] orthopnea [ ] palpitations [ ] murmur  Resp: [ ] negative [ ] cough [ ] shortness of breath [ ] dyspnea [ ] wheezing [ ] sputum [ ] hemoptysis  GI: [ ] negative [ ] nausea [ ] vomiting [ ] diarrhea [ ] constipation [ ] abd pain [ ] dysphagia   : [ ] negative [ ] dysuria [ ] nocturia [ ] hematuria [ ] increased urinary frequency  Musculoskeletal: [ ] negative [ ] back pain [ ] myalgias [ ] arthralgias [ ] fracture  Skin: [ ] negative [ ] rash [ ] itch  Neurological: [ ] negative [ ] headache [ ] dizziness [ ] syncope [ ] weakness [ ] numbness  Psychiatric: [ ] negative [ ] anxiety [ ] depression  Endocrine: [ ] negative [ ] diabetes [ ] thyroid problem  Hematologic/Lymphatic: [ ] negative [ ] anemia [ ] bleeding problem  Allergic/Immunologic: [ ] negative [ ] itchy eyes [ ] nasal discharge [ ] hives [ ] angioedema  [ ] All other systems negative  [ ] Unable to assess ROS because ________    OBJECTIVE:  ICU Vital Signs Last 24 Hrs  T(C): 35.7 (12 Apr 2018 04:00), Max: 36.4 (11 Apr 2018 12:10)  T(F): 96.3 (12 Apr 2018 04:00), Max: 97.5 (11 Apr 2018 12:10)  HR: 65 (12 Apr 2018 06:00) (18 - 75)  BP: 135/63 (12 Apr 2018 06:00) (104/80 - 178/113)  BP(mean): 82 (12 Apr 2018 06:00) (65 - 128)  ABP: --  ABP(mean): --  RR: 18 (12 Apr 2018 06:00) (13 - 23)  SpO2: 98% (12 Apr 2018 06:00) (95% - 100%)        04-11 @ 07:01  -  04-12 @ 07:00  --------------------------------------------------------  IN: 0 mL / OUT: 1000 mL / NET: -1000 mL      CAPILLARY BLOOD GLUCOSE  97 (11 Apr 2018 12:06)      POCT Blood Glucose.: 97 mg/dL (11 Apr 2018 11:23)      PHYSICAL EXAM:  General:   HEENT:   Lymph Nodes:  Neck:   Respiratory:   Cardiovascular:   Abdomen:   Extremities:   Skin:   Neurological:  Psychiatry:    LINES:    HOSPITAL MEDICATIONS:  Standing Meds:  atorvastatin 80 milliGRAM(s) Oral at bedtime  cholecalciferol 1000 Unit(s) Oral daily  influenza   Vaccine 0.5 milliLiter(s) IntraMuscular once      PRN Meds:      LABS:                        13.7   4.24  )-----------( 204      ( 12 Apr 2018 03:20 )             42.2     Hgb Trend: 13.7<--, 15.4<--  04-12    142  |  103  |  18  ----------------------------<  106<H>  3.1<L>   |  28  |  0.67    Ca    9.0      12 Apr 2018 03:20  Phos  3.0     04-12  Mg     2.2     04-12    TPro  7.8  /  Alb  4.8  /  TBili  0.3  /  DBili  x   /  AST  19  /  ALT  18  /  AlkPhos  81  04-11    Creatinine Trend: 0.67<--, 0.67<--  PT/INR - ( 11 Apr 2018 11:59 )   PT: 10.8 SEC;   INR: 0.94          PTT - ( 11 Apr 2018 11:59 )  PTT:32.5 SEC      Venous Blood Gas:  04-11 @ 11:59  7.40/47/44/27/75.9  VBG Lactate: 1.1      MICROBIOLOGY:     RADIOLOGY:  [ ] Reviewed and interpreted by me    EKG: CHIEF COMPLAINT:    Interval Events: No overnight events. pt states all her neurological deficits have resolved (pronator drift and memory changes). Denies ROS    REVIEW OF SYSTEMS:  Constitutional: [ x] negative [ ] fevers [ ] chills [ ] weight loss [ ] weight gain  HEENT: [x ] negative [ ] dry eyes [ ] eye irritation [ ] postnasal drip [ ] nasal congestion  CV: [x ] negative  [ ] chest pain [ ] orthopnea [ ] palpitations [ ] murmur  Resp: [ x] negative [ ] cough [ ] shortness of breath [ ] dyspnea [ ] wheezing [ ] sputum [ ] hemoptysis  GI: [x ] negative [ ] nausea [ ] vomiting [ ] diarrhea [ ] constipation [ ] abd pain [ ] dysphagia   : [x ] negative [ ] dysuria [ ] nocturia [ ] hematuria [ ] increased urinary frequency  Musculoskeletal: [x ] negative [ ] back pain [ ] myalgias [ ] arthralgias [ ] fracture  Skin: [ x] negative [ ] rash [ ] itch  Neurological: [x ] negative [ ] headache [ ] dizziness [ ] syncope [ ] weakness [ ] numbness  Psychiatric: [x ] negative [ ] anxiety [ ] depression  Endocrine: [x ] negative [ ] diabetes [ ] thyroid problem  Hematologic/Lymphatic: [x ] negative [ ] anemia [ ] bleeding problem  Allergic/Immunologic: [ ] negative [ ] itchy eyes [ ] nasal discharge [ ] hives [ ] angioedema  [ x] All other systems negative  [ ] Unable to assess ROS because ________    OBJECTIVE:  ICU Vital Signs Last 24 Hrs  T(C): 35.7 (12 Apr 2018 04:00), Max: 36.4 (11 Apr 2018 12:10)  T(F): 96.3 (12 Apr 2018 04:00), Max: 97.5 (11 Apr 2018 12:10)  HR: 65 (12 Apr 2018 06:00) (18 - 75)  BP: 135/63 (12 Apr 2018 06:00) (104/80 - 178/113)  BP(mean): 82 (12 Apr 2018 06:00) (65 - 128)  ABP: --  ABP(mean): --  RR: 18 (12 Apr 2018 06:00) (13 - 23)  SpO2: 98% (12 Apr 2018 06:00) (95% - 100%)        04-11 @ 07:01  -  04-12 @ 07:00  --------------------------------------------------------  IN: 0 mL / OUT: 1000 mL / NET: -1000 mL      CAPILLARY BLOOD GLUCOSE  97 (11 Apr 2018 12:06)      POCT Blood Glucose.: 97 mg/dL (11 Apr 2018 11:23)      PHYSICAL EXAM:  General: NAD  HEENT: PERRLA, EOMI, no issues  Neck: supple  Respiratory: CTA b/l  Cardiovascular: no m/r/g, s1, s2 present  Abdomen: soft nontender  Extremities: no edema  Skin: no issues  Neurological: no pronator drift, memory intact  Psychiatry: no issues    HOSPITAL MEDICATIONS:  Standing Meds:  atorvastatin 80 milliGRAM(s) Oral at bedtime  cholecalciferol 1000 Unit(s) Oral daily  influenza   Vaccine 0.5 milliLiter(s) IntraMuscular once      PRN Meds:      LABS:                        13.7   4.24  )-----------( 204      ( 12 Apr 2018 03:20 )             42.2     Hgb Trend: 13.7<--, 15.4<--  04-12    142  |  103  |  18  ----------------------------<  106<H>  3.1<L>   |  28  |  0.67    Ca    9.0      12 Apr 2018 03:20  Phos  3.0     04-12  Mg     2.2     04-12    TPro  7.8  /  Alb  4.8  /  TBili  0.3  /  DBili  x   /  AST  19  /  ALT  18  /  AlkPhos  81  04-11    Creatinine Trend: 0.67<--, 0.67<--  PT/INR - ( 11 Apr 2018 11:59 )   PT: 10.8 SEC;   INR: 0.94          PTT - ( 11 Apr 2018 11:59 )  PTT:32.5 SEC      Venous Blood Gas:  04-11 @ 11:59  7.40/47/44/27/75.9  VBG Lactate: 1.1      MICROBIOLOGY:     RADIOLOGY:  [ ] Reviewed and interpreted by me    EKG:

## 2018-04-12 NOTE — DISCHARGE NOTE ADULT - MEDICATION SUMMARY - MEDICATIONS TO TAKE
I will START or STAY ON the medications listed below when I get home from the hospital:    Vitamin D3 1000 intl units oral tablet  -- 1 tab(s) by mouth once a day  -- Indication: For supplement

## 2018-04-12 NOTE — DISCHARGE NOTE ADULT - PATIENT PORTAL LINK FT
You can access the [facility] Patient Portal, offered by [facility], by registering with the following website: [email or web address]

## 2018-04-12 NOTE — DISCHARGE NOTE ADULT - OTHER SIGNIFICANT FINDINGS
Per Dr. Trinidad's office, when ordering the MRI, tell the radiologist to perform "MARS" protocol, no documentation necessary from Dr. Trinidad's office    PCP Dr. Sergio Murphy  admitting hospitalist Dr. Dodge [phone]/ [phone]  Dr. Indio Perez (cardiologist in [facility]): [phone]  Dr. Magdaleno (): [phone]  ------------------------------    4/11 CTH: Microvascular disease. No acute intracranial hemorrhage.  4/12 CTA H&N: No abnormal intracranial enhancement. CT angiography demonstrates no flow-limiting stenosis by NASCET criteria along extracranial vasculature. Intracranially, there is no significant stenosis, aneurysm, or vascular malformation. Mild narrowing along the left V4 segment is seen.  4/12 Repeat CTH s/p tPA: No gross CT evidence for acute infarct or acute hemorrhage on a motion limited study.

## 2018-04-12 NOTE — PHYSICAL THERAPY INITIAL EVALUATION ADULT - PERTINENT HX OF CURRENT PROBLEM, REHAB EVAL
72 F with no significant PMH presented to ED with c/o of acute onset of memory problem, w possible ischemic stroke

## 2018-04-12 NOTE — CONSULT NOTE ADULT - SUBJECTIVE AND OBJECTIVE BOX
CHIEF COMPLAINT:  gobal amnesia evalute crdiac status  HISTORY OF PRESENT ILLNESS:  HPI:  72 F with pmhx of coronary spasms, HTN no longer tx, present with c/o of acute onset of memory problem. As per family member, her last seen normal was 9:30 am. Pt at her baseline unitl 9:30 am, then pt was asking about items that she could not remember were purchased yesterday. Pt also did not remember all of yesterday's event.  Family members denied any h/a, SOB, palpitations, fevers, chills, focal weakness, focal sensory loss. She was taken to the ED.     In ED, pt was evaluate by the stroke team, found to have slight pronator drift on R side, given tPa at 12:15 pm   CT with and without contrast normal  memory has returned to normal  no cardiac hx no hx of afib palpitations cp or sob  PAST MEDICAL & SURGICAL HISTORY:  Anemia  Thyroid nodule  Fall: s/p fall in  with neck, pain and knee injury  Uterine polyp  Adnexal cyst: b/l  S/P   S/P oophorectomy: right, in her 20s  S/P cataract extraction: b/l  H/O thyroid nodule: s/p excision   S/P hernia repair: years ago; b/l inguinal  S/P knee replacement: left           MEDICATIONS:  aspirin  chewable 81 milliGRAM(s) Oral daily  enoxaparin Injectable 40 milliGRAM(s) SubCutaneous daily            atorvastatin 80 milliGRAM(s) Oral at bedtime    cholecalciferol 1000 Unit(s) Oral daily  influenza   Vaccine 0.5 milliLiter(s) IntraMuscular once      FAMILY HISTORY:  Family history of stroke (Grandparent)    Allergies    penicillin (Unknown)    Intolerances    	    PHYSICAL EXAM:  T(C): 35.7 (18 @ 16:00), Max: 36.3 (18 @ 00:00)  HR: 67 (18 @ 20:25) (52 - 75)  BP: 117/70 (18 @ 20:25) (96/54 - 149/61)  RR: 19 (18 @ 20:25) (15 - 23)  SpO2: 97% (18 @ 20:25) (93% - 100%)  Wt(kg): --  I&O's Summary    2018 07:  -  2018 07:00  --------------------------------------------------------  IN: 0 mL / OUT: 1000 mL / NET: -1000 mL    2018 07:  -  2018 21:17  --------------------------------------------------------  IN: 0 mL / OUT: 500 mL / NET: -500 mL        Appearance: Normal awake alert 	  HEENT:   Normal oral mucosa, PERRL, EOMI	  Cardiovascular: Normal S1 S2, No JVD, No murmurs  Respiratory: Lungs clear to auscultation	  Psychiatry: A & O x 3, Mood & affect appropriate	  Neurologic: Non-focal  Extremities: No clubbing, cyanosis or edema  Vascular: Peripheral pulses palpable 2+ bilaterally    TELEMETRY: 	    ECG: NSR no acute changes 	  CT  < from: CT Angio Head w/ IV Cont (18 @ 14:16) >    IMPRESSION:  No abnormal intracranial enhancement.  CT angiography demonstrates no flow-limiting stenosis by NASCET criteria   along extracranial vasculature.  Intracranially, there is no significant stenosis, aneurysm, or vascular   malformation. Mild narrowing along the left V4 segment is seen.                  PIEDAD VIGIL M.D., ATTENDING RADIOLOGIST  This document has been electronically signed.  2    < end of copied text >    LABS:	 	    CARDIAC MARKERS:  Troponin T, Serum: < 0.06 ng/mL ( @ 11:59)                                  13.7   4.24  )-----------( 204      ( 2018 03:20 )             42.2     -12    142  |  103  |  18  ----------------------------<  106<H>  3.1<L>   |  28  |  0.67    Ca    9.0      2018 03:20  Phos  3.0     -12  Mg     2.2     -12    TPro  7.8  /  Alb  4.8  /  TBili  0.3  /  DBili  x   /  AST  19  /  ALT  18  /  AlkPhos  81      proBNP:   Lipid Profile:   HgA1c: Hemoglobin A1C, Whole Blood: 5.3 % ( @ 03:20)    TSH: Thyroid Stimulating Hormone, Serum: 3.06 uIU/mL ( @ 03:20)
CHIEF COMPLAINT: CVA    HISTORY OF PRESENT ILLNESS:  Asked to see this patient for ILR candidacy.  This is a 72 year old female with a PMH significant for coronary spasms and untreated hypertension who presented on 2018 with a chief complaint of acute onset of memory deficits; in the ED found to have mild right sided pronator drift; ischemic stroke was suspected and treated with tPa.  EP has been consulted for ILR placement.    PAST MEDICAL & SURGICAL HISTORY:  Anemia  Thyroid nodule  Fall: s/p fall in  with neck, pain and knee injury  Uterine polyp  Adnexal cyst: b/l  S/P   S/P oophorectomy: right, in her 20s  S/P cataract extraction: b/l  H/O thyroid nodule: s/p excision   S/P hernia repair: years ago; b/l inguinal  S/P knee replacement: left     PREVIOUS DIAGNOSTIC TESTING:    [ ] Echocardiogram:  [ ]  Catheterization:  [ ] Stress Test:  	    MEDICATIONS:  aspirin  chewable 81 milliGRAM(s) Oral daily  enoxaparin Injectable 40 milliGRAM(s) SubCutaneous daily  atorvastatin 80 milliGRAM(s) Oral at bedtime  cholecalciferol 1000 Unit(s) Oral daily  influenza   Vaccine 0.5 milliLiter(s) IntraMuscular once    FAMILY HISTORY:  Family history of stroke (Grandparent)    SOCIAL HISTORY:    [x ] Non-smoker  [ ] Smoker  [ ] Alcohol    Allergies  penicillin (Unknown)  Intolerances N/A    REVIEW OF SYSTEMS:  CONSTITUTIONAL: No fever, weight loss, or fatigue  EYES: No eye pain, visual disturbances, or discharge  ENMT:  No difficulty hearing, tinnitus, vertigo; No sinus or throat pain  NECK: No pain or stiffness  RESPIRATORY: No cough, wheezing, chills or hemoptysis; No Shortness of Breath  CARDIOVASCULAR: No chest pain, palpitations, passing out, dizziness, or leg swelling  GASTROINTESTINAL: No abdominal or epigastric pain. No nausea, vomiting, or hematemesis; No diarrhea or constipation. No melena or hematochezia.  GENITOURINARY: No dysuria, frequency, hematuria, or incontinence  NEUROLOGICAL: No headaches, memory loss, loss of strength, numbness, or tremors  SKIN: No itching, burning, rashes, or lesions   LYMPH Nodes: No enlarged glands  ENDOCRINE: No heat or cold intolerance; No hair loss  MUSCULOSKELETAL: No joint pain or swelling; No muscle, back, or extremity pain  PSYCHIATRIC: No depression, anxiety, mood swings, or difficulty sleeping  HEME/LYMPH: No easy bruising, or bleeding gums  ALLERGY AND IMMUNOLOGIC: No hives or eczema	    [x ] All others negative	  [ ] Unable to obtain    PHYSICAL EXAM:  T(C): 35.7 (18 @ 16:00), Max: 36.3 (18 @ 00:00)  HR: 67 (18 @ 18:00) (52 - 75)  BP: 112/61 (18 @ 17:00) (96/54 - 151/66)  RR: 19 (18 @ 18:00) (15 - 23)  SpO2: 98% (18 @ 18:00) (93% - 100%)  Wt(kg): --  I&O's Summary    2018 07:  -  2018 07:00  --------------------------------------------------------  IN: 0 mL / OUT: 1000 mL / NET: -1000 mL    2018 07:  -  2018 19:06  --------------------------------------------------------  IN: 0 mL / OUT: 500 mL / NET: -500 mL    Appearance: Normal	  HEENT:   Normal oral mucosa, PERRL, EOMI	  Lymphatic: No lymphadenopathy  Cardiovascular: Normal S1 S2, No JVD, No murmurs, No edema  Respiratory: Lungs clear to auscultation	  Psychiatry: A & O x 3, Mood & affect appropriate  Gastrointestinal:  Soft, Non-tender, + BS	  Skin: No rashes, No ecchymoses, No cyanosis	  Neurologic: Non-focal  Extremities: Normal range of motion, No clubbing, cyanosis or edema  Vascular: Peripheral pulses palpable 2+ bilaterally    TELEMETRY: 	    ECG:  	    RADIOLOGY:  OTHER: 	  	  LABS:	 	  CARDIAC MARKERS:                        13.7   4.24  )-----------( 204      ( 2018 03:20 )             42.2         142  |  103  |  18  ----------------------------<  106<H>  3.1<L>   |  28  |  0.67    Ca    9.0      2018 03:20  Phos  3.0       Mg     2.2         TPro  7.8  /  Alb  4.8  /  TBili  0.3  /  DBili  x   /  AST  19  /  ALT  18  /  AlkPhos  81      proBNP: N/A  Lipid Profile: N/A   HgA1c: Hemoglobin A1C, Whole Blood: 5.3 % ( @ 03:20)    TSH: Thyroid Stimulating Hormone, Serum: 3.06 uIU/mL ( @ 03:20)
HPI:    72  YF with no significant PMH presented to ED with c/o of acute onset of memory problem. As per family member, Her last seen normal was 9 am. She woke up at 7:30 in AM and was doing breakfast at 9 am. There was a acute change in remembering events. Her NIHSS was 0 and MRS was 0. She had difficulty with remembering recent events and mild weakness in RUE. tPA was given at 12:15 after consenting family members.        MEDICATIONS  (STANDING):    MEDICATIONS  (PRN):      PAST MEDICAL & SURGICAL HISTORY:  Anemia  Thyroid nodule  Fall: s/p fall in  with neck, pain and knee injury  Uterine polyp  Adnexal cyst: b/l  S/P   S/P oophorectomy: right, in her 20s  S/P cataract extraction: b/l  H/O thyroid nodule: s/p excision   S/P hernia repair: years ago; b/l inguinal  S/P knee replacement: left       FAMILY HISTORY:      Allergies    penicillin (Unknown)    Intolerances          SHx - No smoking, No ETOH, No drug abuse      Review of Systems:  CONSTITUTIONAL:  No weight loss, fever, chills, weakness or fatigue.  HEENT:  Eyes:  No visual loss, blurred vision, double vision or yellow sclerae. Ears, Nose, Throat:  No hearing loss, sneezing, congestion, runny nose or sore throat.  SKIN:  No rash or itching.  CARDIOVASCULAR:  No chest pain, chest pressure or chest discomfort. No palpitations or edema.  RESPIRATORY:  No shortness of breath, cough or sputum.  GASTROINTESTINAL:  No anorexia, nausea, vomiting or diarrhea. No abdominal pain or blood.  GENITOURINARY:  NO Burning on urination.   NEUROLOGICAL: See HPI  MUSCULOSKELETAL:  No muscle, back pain, joint pain or stiffness.  HEMATOLOGIC:  No anemia, bleeding or bruising.  LYMPHATICS:  No enlarged nodes. No history of splenectomy.  PSYCHIATRIC:  No history of depression or anxiety.  ENDOCRINOLOGIC:  No reports of sweating, cold or heat intolerance. No polyuria or polydipsia.  ALLERGIES:  No history of asthma, hives, eczema or rhinitis.        Vital Signs Last 24 Hrs  T(C): 36.4 (2018 12:10), Max: 36.4 (2018 12:10)  T(F): 97.5 (2018 12:10), Max: 97.5 (2018 12:10)  HR: 70 (2018 12:10) (62 - 70)  BP: 178/113 (2018 12:10) (157/73 - 178/113)  BP(mean): 128 (2018 12:10) (128 - 128)  RR: 17 (2018 12:10) (17 - 18)  SpO2: 100% (2018 12:10) (100% - 100%)    General Exam:   General appearance: No acute distress                   Neurological Exam:    Mental Status: Orientated to self, month and place.  Attention intact.  No dysarthria, aphasia or neglect. difficulty remembering three words after 5 minutes   Cranial Nerves: PERRL, EOMI, CN V1-3 intact to light touch and pinprick.  No facial asymmetry, Tongue, uvula and palate midline.    Motor:   Tone: normal.                  Strength:   RUE 4/5, rest 5/5   Pronator drift: present in RUE            Dysmetria: None to finger-nose-finger or heel-shin-heel  No truncal ataxia.    Tremor: No resting, postural or action tremor.  No myoclonus.  Sensation: intact to light touch  Deep Tendon Reflexes: 1+ bilateral biceps, triceps, brachioradialis, knee   Toes flexor bilaterally  Gait: normal and stable    Other:        144  |  104  |  19  ----------------------------<  92  3.8   |  27  |  0.67    Ca    9.3      2018 11:59    TPro  7.8  /  Alb  4.8  /  TBili  0.3  /  DBili  x   /  AST  19  /  ALT  18  /  AlkPhos  81      144  |  104  |  19  ----------------------------<  92  3.8   |  27  |  0.67    Ca    9.3      2018 11:59    TPro  7.8  /  Alb  4.8  /  TBili  0.3  /  DBili  x   /  AST  19  /  ALT  18  /  AlkPhos  81                            15.4   5.56  )-----------( 207      ( 2018 11:59 )             46.7       Radiology    CT head     < from: CT Head w/o Cont (09 @ 19:17) >    IMPRESSION:    No acute intracranial hemorrhage, or calvarial fracture.    < end of copied text >

## 2018-04-12 NOTE — CONSULT NOTE ADULT - ASSESSMENT
1. global amnesia now resolved.  There is no clear evidence that patient had CVA  2. negative ECG physical exam, no cardiac history  3 S/P TPA    Recommend  neuro followup re: did patient have CVA or is this just global amnesia without etiology  no indication for ANUEL or link at this point  2 D echo baseline no

## 2018-04-12 NOTE — PHYSICAL THERAPY INITIAL EVALUATION ADULT - ACTIVE RANGE OF MOTION EXAMINATION, REHAB EVAL
bilateral lower extremity Active ROM was WNL (within normal limits)/luz maria. upper extremity Active ROM was WNL (within normal limits)

## 2018-04-12 NOTE — CONSULT NOTE ADULT - ASSESSMENT
72 year old female with a PMH significant for coronary spasms and untreated hypertension who presented on Apr 11, 2018 with a chief complaint of acute onset of memory deficits; s/p ischemic stroke was suspected and treated with tPa.  There is no telemetry evidence of atrial arrhythmias; nor are there contraindications to long term anticoagulation.  In addition, there is no clear pacing indication at this time:      - Per CRYSTAL-AF, the patient would benefit from prolonged monitoring for the detection of asymptomatic AF/PAF as the cause of potential cardioembolic source  - awaiting 2D TTE/ANUEL to evaluate for PFO/FILIPPO clot and   - Risks, benefits, and alternatives discussed with the patient & next of kin  - continue telemetry; we will follow  - discussed with EP attending 72 year old female with a PMH significant for coronary spasms and untreated hypertension who presented on Apr 11, 2018 with a chief complaint of acute onset of memory deficits; s/p ischemic stroke -- treated with tPa.  There is no telemetry evidence of atrial arrhythmias; nor are there contraindications to long term anticoagulation.  In addition, there is no clear pacing indication at this time:      - Per CRYSTAL-AF, the patient would benefit from prolonged monitoring for the detection of asymptomatic AF/PAF as the cause of potential cardioembolic source  - awaiting 2D TTE/ANUEL to evaluate for PFO/FILIPPO clot and   - Risks, benefits, and alternatives discussed with the patient & next of kin  - continue telemetry; we will follow  - discussed with EP attending

## 2018-04-12 NOTE — PROGRESS NOTE ADULT - SUBJECTIVE AND OBJECTIVE BOX
S: Patient was seen and examined at bedside this morning.  Family at bedside feel pt is much improved.  Pt remembers everything since she has been in the hospital. Things from prior to hospital she doesn't remember on her own but knows them now bc her family told her.    O: Vitals reviewed  Exam: General: Lying in bed in NAD  Neuro: MS: AA0x3, follows commands, names most things correctly but some mild hesitancy in speech and when asked what the tip of a pen is she says "refill", repetition intact, no dysarthria, no neglect  CN: PERRL, EOMI, VFF, V1-V3 intact, no facial asymmetry, uvula/tongue midline, SCM's intact  Motor: 5/5 x4 extremities, no drifts  Sensory: Intact LT all  Coordination: No dysmetria FTN b/l    Meds/labs/imaging reviewed S: Patient was seen and examined at bedside this morning.  Family at bedside feel pt is much improved.  Pt remembers everything since she has been in the hospital. Things from prior to hospital she doesn't remember on her own but knows them now bc her family told her.    ROS: All negative except documented above.    O:   Exam: General: Lying in bed in NAD  Neuro: MS: AA0x3, follows commands, names most things correctly but some mild hesitancy in speech and when asked what the tip of a pen is she says "refill", repetition intact, no dysarthria, no neglect  CN: PERRL, EOMI, VFF, V1-V3 intact, no facial asymmetry, uvula/tongue midline, SCM's intact  Motor: 5/5 x4 extremities, no drifts  Sensory: Intact LT all  Coordination: No dysmetria FTN b/l                          13.7   4.24  )-----------( 204      ( 12 Apr 2018 03:20 )             42.2     04-12    142  |  103  |  18  ----------------------------<  106<H>  3.1<L>   |  28  |  0.67    Ca    9.0      12 Apr 2018 03:20  Phos  3.0     04-12  Mg     2.2     04-12    TPro  7.8  /  Alb  4.8  /  TBili  0.3  /  DBili  x   /  AST  19  /  ALT  18  /  AlkPhos  81  04-11    CAPILLARY BLOOD GLUCOSE        PT/INR - ( 11 Apr 2018 11:59 )   PT: 10.8 SEC;   INR: 0.94          PTT - ( 11 Apr 2018 11:59 )  PTT:32.5 SEC    I&O's Summary    11 Apr 2018 07:01  -  12 Apr 2018 07:00  --------------------------------------------------------  IN: 0 mL / OUT: 1000 mL / NET: -1000 mL    12 Apr 2018 07:01  -  12 Apr 2018 18:32  --------------------------------------------------------  IN: 0 mL / OUT: 500 mL / NET: -500 mL      Vital Signs Last 24 Hrs  T(C): 35.7 (12 Apr 2018 16:00), Max: 36.3 (12 Apr 2018 00:00)  T(F): 96.2 (12 Apr 2018 16:00), Max: 97.3 (12 Apr 2018 00:00)  HR: 62 (12 Apr 2018 17:00) (52 - 75)  BP: 112/61 (12 Apr 2018 17:00) (96/54 - 151/66)  BP(mean): 73 (12 Apr 2018 17:00) (60 - 123)  RR: 18 (12 Apr 2018 17:00) (15 - 23)  SpO2: 96% (12 Apr 2018 17:00) (93% - 100%)  Meds/labs/imaging reviewed

## 2018-04-12 NOTE — DISCHARGE NOTE ADULT - CARE PROVIDER_API CALL
Deshawn Tatum (MD), Cardiovascular Disease; Internal Medicine  [address] [address] [place] [address]  Phone: [phone]  Fax: [phone]    Bryan Hand (DO), Neurology; Vascular Neurology  [address] [facility] [address]  [place] [address]  Phone: [phone]  Fax: [phone]

## 2018-04-12 NOTE — DISCHARGE NOTE ADULT - CARE PLAN
Principal Discharge DX:	Transient global amnesia  Goal:	resolved  Assessment and plan of treatment:	F/U with your PMD and Neurologist Dr. Hand in 1-2 weeks

## 2018-04-12 NOTE — CHART NOTE - NSCHARTNOTEFT_GEN_A_CORE
MICU Transfer Note    Transfer from: MICU    Transfer to: (  ) Medicine    (  ) Telemetry     (   ) RCU        (    ) Palliative         (   ) Stroke Unit          (   ) __________________    Accepting Physician:  Signout given to:     MICU COURSE:  72 F with pmhx of coronary spasms not on any medications, present with c/o of acute onset of memory problem. As per family member, her last seen normal was 9:30 am. Pt at her baseline until 9:30 am, then pt was asking about items that she could not remember were purchased yesterday. Pt also did not remember all of yesterday's event.  Family members denied any h/a, SOB, palpitations, fevers, chills, focal weakness, focal sensory loss. She was taken to the ED.     In ED, initial CT head negative for acute pathology. Pt was evaluated by the stroke team, found to have slight pronator drift on R side, given 50mg tPa at 12:15 pm. Pt had CTA at 2:15pm performed which demonstrated to intracranial enhancement. No overnight events and pt woke up at baseline memory with no pronator drift. 24h post-TPA CT head negative for acute pathology. Pt stable for floor.         ASSESSMENT & PLAN:             FOR FOLLOW UP:  [ ] consult Dr. Magdaleno for EP MICU Transfer Note    Transfer from: MICU    Transfer to: (  ) Medicine    (  ) Telemetry     (   ) RCU        (    ) Palliative         (   ) Stroke Unit          (   ) __________________    Accepting Physician:  Signout given to:     MICU COURSE:  72 F with pmhx of coronary spasms not on any medications, present with c/o of acute onset of memory problem. As per family member, her last seen normal was 9:30 am. Pt at her baseline until 9:30 am, then pt was asking about items that she could not remember were purchased yesterday. Pt also did not remember all of yesterday's event.  Family members denied any h/a, SOB, palpitations, fevers, chills, focal weakness, focal sensory loss. She was taken to the ED.     In ED, initial CT head negative for acute pathology. Pt was evaluated by the stroke team, found to have slight pronator drift on R side, given 50mg tPa at 12:15 pm. Pt had CTA at 2:15pm performed which demonstrated to intracranial enhancement. No overnight events and pt woke up at baseline memory with no pronator drift. 24h post-TPA CT head negative for acute pathology. Started on Aspirin and lovenox. Pt stable for floor.         ASSESSMENT & PLAN:             FOR FOLLOW UP:  [ ] consult Dr. Magdaleno for EP MICU Transfer Note    Transfer from: MICU    Transfer to: (  ) Medicine    (  ) Telemetry     (   ) RCU        (    ) Palliative         (   ) Stroke Unit          (   ) __________________    Accepting Physician:  Signout given to:     MICU COURSE:  72 F with pmhx of coronary spasms not on any medications, present with c/o of acute onset of memory problem. As per family member, her last seen normal was 9:30 am. Pt at her baseline until 9:30 am, then pt was asking about items that she could not remember were purchased yesterday. Pt also did not remember all of yesterday's event.  Family members denied any h/a, SOB, palpitations, fevers, chills, focal weakness, focal sensory loss. She was taken to the ED.     In ED, initial CT head negative for acute pathology. Pt was evaluated by the stroke team, found to have slight pronator drift on R side, given 50mg tPa at 12:15 pm. Pt had CTA at 2:15pm performed which demonstrated to intracranial enhancement. No overnight events and pt woke up at baseline memory with no pronator drift. 24h post-TPA CT head negative for acute pathology. Started on Aspirin and lovenox. Pt stable for floor.         ASSESSMENT & PLAN:   72 F with pmhx presented w amnesia 2/2 to possible stroke s/p tpa           FOR FOLLOW UP:  [ ] Dr. Magdaleno for EP for possible loop evaluation  []f/u TTE  []f/u Dr. Perez for cardiology  [] f/u radiology for protocol to perform MRI studies in setting of TNK MICU Transfer Note    Transfer from: MICU    Transfer to: (  ) Medicine    (  ) Telemetry     (   ) RCU        (    ) Palliative         (   ) Stroke Unit          (   ) __________________    Accepting Physician:  Signout given to:     MICU COURSE:  72 F with pmhx of coronary spasms not on any medications, present with c/o of acute onset of memory problem. As per family member, her last seen normal was 9:30 am. Pt at her baseline until 9:30 am, then pt was asking about items that she could not remember were purchased yesterday. Pt also did not remember all of yesterday's event.  Family members denied any h/a, SOB, palpitations, fevers, chills, focal weakness, focal sensory loss. She was taken to the ED.     In ED, initial CT head negative for acute pathology. Pt was evaluated by the stroke team, found to have slight pronator drift on R side, given 50mg tPa at 12:15 pm. Pt had CTA at 2:15pm performed which demonstrated to intracranial enhancement. No overnight events and pt woke up at baseline memory with no pronator drift. 24h post-TPA CT head negative for acute pathology. Started on Aspirin and lovenox. Pt stable for floor.         ASSESSMENT & PLAN:   72 F with pmhx presented w amnesia 2/2 to possible stroke s/p tpa     Neuro:  -pt had acute onset of memory loss. Per joshua, pt had pronator drift; concerned for ischemic stroke (CT head negative for hemorrhagic stroke)  -s/p tpa at 12:15 pm  -neuro q1h for 24 h s/p tpa  -repeat CT head 24 post tpa negative  -for MRI, spoken with Dr Trinidad's office (surgeon for TKR) who stated to tell radiology to perform "MARS" protocol  -asa and DVT ppx 24 hr post stroke  -on atorvastain 80mg  -will obtain HbgA1c, lipid profile  -other etiologies of memory loss pending, TSH normal, folate normal, B12 normal  -currently neurologically intact     Respiratory  -no issues    Cardiovascular  -permissive HTN of <180/105 given possible stroke   -on atorvastain 80mg for stroke  -pending TTE w bubble study for stroke eval     GI  -DASH diet    ID  -no issues    Heme  -no issues    Endo  -will screen for diabetes w HbgA1c in setting of possible stroke    Skin  -no issues    DVT ppx  -lovenox        FOR FOLLOW UP:  [ ] Dr. Magdaleno for EP for possible loop evaluation  []f/u TTE  []f/u Dr. Perez for cardiology  [] f/u radiology for protocol to perform MRI studies in setting of TNK MICU Transfer Note    Transfer from: MICU    Transfer to: (  ) Medicine    (  ) Telemetry     (   ) RCU        (    ) Palliative         (   ) Stroke Unit          (   ) __________________    Accepting Physician:  Signout given to:     MICU COURSE:  72 F with pmhx of coronary spasms not on any medications, present with c/o of acute onset of memory problem. As per family member, her last seen normal was 9:30 am. Pt at her baseline until 9:30 am, then pt was asking about items that she could not remember were purchased yesterday. Pt also did not remember all of yesterday's event.  Family members denied any h/a, SOB, palpitations, fevers, chills, focal weakness, focal sensory loss. She was taken to the ED.     In ED, initial CT head negative for acute pathology. Pt was evaluated by the stroke team, found to have slight pronator drift on R side, given 50mg tPa at 12:15 pm. Pt had CTA at 2:15pm performed which demonstrated no intracranial enhancement. No overnight events and pt woke up at baseline memory with no pronator drift. 24h post-TPA CT head negative for acute pathology. Started on Aspirin and lovenox. Pt stable for floor.         ASSESSMENT & PLAN:   72 F with pmhx presented w amnesia 2/2 to possible stroke s/p tpa     Neuro:  -pt had acute onset of memory loss. Per joshua, pt had pronator drift; concerned for ischemic stroke (CT head negative for hemorrhagic stroke)  -s/p tpa at 12:15 pm  -neuro q1h for 24 h s/p tpa  -repeat CT head 24 post tpa negative  -for MRI, spoken with Dr Trinidad's office (surgeon for TKR) who stated to tell radiology to perform "MARS" protocol  -asa and DVT ppx 24 hr post stroke  -on atorvastain 80mg  -will obtain HbgA1c, lipid profile  -other etiologies of memory loss pending, TSH normal, folate normal, B12 normal  -currently neurologically intact     Respiratory  -no issues    Cardiovascular  -permissive HTN of <180/105 given possible stroke   -on atorvastain 80mg for stroke  -pending TTE w bubble study for stroke eval     GI  -DASH diet    ID  -no issues    Heme  -no issues    Endo  -will screen for diabetes w HbgA1c in setting of possible stroke    Skin  -no issues    DVT ppx  -lovenox        FOR FOLLOW UP:  [ ] Dr. Magdaleno for EP for possible loop evaluation  []f/u TTE  []f/u Dr. Perez for cardiology  [] f/u radiology for protocol to perform MRI studies in setting of TNK

## 2018-04-12 NOTE — PROGRESS NOTE ADULT - ASSESSMENT
72 F with no significant PMH presented to ED with c/o of acute onset of memory problem, w possible ischemic stroke    Neuro:  -pt had acute onset of memory loss. Per joshua, pt had pronator drift; concerned for ischemic stroke (CT head negative for hemorrhagic stroke)  -s/p tpa at 12:15 pm  -neuro q1h for 24 h s/p tpa  CTA head and neck   -repeat CT head 24 post tpa  -will have to access if TKR of L knee is compatible w MRI   -asa and DVT ppx 24 hr post stroke  -on atorvastain 80mg  -will obtain HbgA1c, lipid profile  -will also access for other etiologies of memory loss, TSH, syphilis HIV, folate, B12  -currently neurologically intact     Respiratory  -no issues    Cardiovascular  -permissive HTN of <180/105 given possible stroke   -on atorvastain 80mg for stroke  -TTE w bubble study for stroke eval     GI  -currently will be NPO 12 hours post tpa, then will access for a bedside dysphagia screen, and if no abnormalities, will start diet    ID  -no issues    Heme  -no issues    Endo  -will screen for diabetes w HbgA1c in setting of possible stroke    Skin  -no issues    DVT ppx  -currently on compression strokings until CT head 24 hours tpa is confirmed to be negative for intracranial hemorrhage 72 F with no significant PMH presented to ED with c/o of acute onset of memory problem, w possible ischemic stroke    Neuro:  -pt had acute onset of memory loss. Per joshua, pt had pronator drift; concerned for ischemic stroke (CT head negative for hemorrhagic stroke)  -s/p tpa at 12:15 pm  -neuro q1h for 24 h s/p tpa  CTA head and neck   -repeat CT head 24 post tpa  -will have to access if TKR of L knee is compatible w MRI   -asa and DVT ppx 24 hr post stroke  -on atorvastain 80mg  -will obtain HbgA1c, lipid profile  -other etiologies of memory loss, TSH normal, folate normal, B12 normal  -currently neurologically intact     Respiratory  -no issues    Cardiovascular  -permissive HTN of <180/105 given possible stroke   -on atorvastain 80mg for stroke  -pending TTE w bubble study for stroke eval     GI  -DASH diet    ID  -no issues    Heme  -no issues    Endo  -will screen for diabetes w HbgA1c in setting of possible stroke    Skin  -no issues    DVT ppx  -currently on compression strokings until CT head 24 hours tpa is confirmed to be negative for intracranial hemorrhage

## 2018-04-13 VITALS
HEART RATE: 60 BPM | TEMPERATURE: 98 F | DIASTOLIC BLOOD PRESSURE: 80 MMHG | WEIGHT: 130.95 LBS | SYSTOLIC BLOOD PRESSURE: 155 MMHG | OXYGEN SATURATION: 100 % | RESPIRATION RATE: 18 BRPM

## 2018-04-13 LAB
ALBUMIN SERPL ELPH-MCNC: 3.9 G/DL — SIGNIFICANT CHANGE UP (ref 3.3–5)
ALP SERPL-CCNC: 64 U/L — SIGNIFICANT CHANGE UP (ref 40–120)
ALT FLD-CCNC: 14 U/L — SIGNIFICANT CHANGE UP (ref 4–33)
AST SERPL-CCNC: 12 U/L — SIGNIFICANT CHANGE UP (ref 4–32)
BASOPHILS # BLD AUTO: 0.04 K/UL — SIGNIFICANT CHANGE UP (ref 0–0.2)
BASOPHILS NFR BLD AUTO: 0.7 % — SIGNIFICANT CHANGE UP (ref 0–2)
BILIRUB SERPL-MCNC: 0.2 MG/DL — SIGNIFICANT CHANGE UP (ref 0.2–1.2)
BUN SERPL-MCNC: 27 MG/DL — HIGH (ref 7–23)
CALCIUM SERPL-MCNC: 8.9 MG/DL — SIGNIFICANT CHANGE UP (ref 8.4–10.5)
CHLORIDE SERPL-SCNC: 103 MMOL/L — SIGNIFICANT CHANGE UP (ref 98–107)
CO2 SERPL-SCNC: 27 MMOL/L — SIGNIFICANT CHANGE UP (ref 22–31)
CREAT SERPL-MCNC: 0.79 MG/DL — SIGNIFICANT CHANGE UP (ref 0.5–1.3)
EOSINOPHIL # BLD AUTO: 0.19 K/UL — SIGNIFICANT CHANGE UP (ref 0–0.5)
EOSINOPHIL NFR BLD AUTO: 3.6 % — SIGNIFICANT CHANGE UP (ref 0–6)
FOLATE SERPL-MCNC: 18.9 NG/ML — SIGNIFICANT CHANGE UP (ref 4.7–20)
GLUCOSE SERPL-MCNC: 87 MG/DL — SIGNIFICANT CHANGE UP (ref 70–99)
HCT VFR BLD CALC: 41.3 % — SIGNIFICANT CHANGE UP (ref 34.5–45)
HGB BLD-MCNC: 13.7 G/DL — SIGNIFICANT CHANGE UP (ref 11.5–15.5)
IMM GRANULOCYTES # BLD AUTO: 0.01 # — SIGNIFICANT CHANGE UP
IMM GRANULOCYTES NFR BLD AUTO: 0.2 % — SIGNIFICANT CHANGE UP (ref 0–1.5)
LYMPHOCYTES # BLD AUTO: 2.05 K/UL — SIGNIFICANT CHANGE UP (ref 1–3.3)
LYMPHOCYTES # BLD AUTO: 38.3 % — SIGNIFICANT CHANGE UP (ref 13–44)
MAGNESIUM SERPL-MCNC: 2 MG/DL — SIGNIFICANT CHANGE UP (ref 1.6–2.6)
MCHC RBC-ENTMCNC: 29.5 PG — SIGNIFICANT CHANGE UP (ref 27–34)
MCHC RBC-ENTMCNC: 33.2 % — SIGNIFICANT CHANGE UP (ref 32–36)
MCV RBC AUTO: 89 FL — SIGNIFICANT CHANGE UP (ref 80–100)
MONOCYTES # BLD AUTO: 0.47 K/UL — SIGNIFICANT CHANGE UP (ref 0–0.9)
MONOCYTES NFR BLD AUTO: 8.8 % — SIGNIFICANT CHANGE UP (ref 2–14)
NEUTROPHILS # BLD AUTO: 2.59 K/UL — SIGNIFICANT CHANGE UP (ref 1.8–7.4)
NEUTROPHILS NFR BLD AUTO: 48.4 % — SIGNIFICANT CHANGE UP (ref 43–77)
NRBC # FLD: 0 — SIGNIFICANT CHANGE UP
PHOSPHATE SERPL-MCNC: 3.6 MG/DL — SIGNIFICANT CHANGE UP (ref 2.5–4.5)
PLATELET # BLD AUTO: 208 K/UL — SIGNIFICANT CHANGE UP (ref 150–400)
PMV BLD: 10.3 FL — SIGNIFICANT CHANGE UP (ref 7–13)
POTASSIUM SERPL-MCNC: 3.8 MMOL/L — SIGNIFICANT CHANGE UP (ref 3.5–5.3)
POTASSIUM SERPL-SCNC: 3.8 MMOL/L — SIGNIFICANT CHANGE UP (ref 3.5–5.3)
PROT SERPL-MCNC: 6.4 G/DL — SIGNIFICANT CHANGE UP (ref 6–8.3)
RBC # BLD: 4.64 M/UL — SIGNIFICANT CHANGE UP (ref 3.8–5.2)
RBC # FLD: 12.5 % — SIGNIFICANT CHANGE UP (ref 10.3–14.5)
SODIUM SERPL-SCNC: 141 MMOL/L — SIGNIFICANT CHANGE UP (ref 135–145)
T PALLIDUM AB TITR SER: NEGATIVE — SIGNIFICANT CHANGE UP
WBC # BLD: 5.35 K/UL — SIGNIFICANT CHANGE UP (ref 3.8–10.5)
WBC # FLD AUTO: 5.35 K/UL — SIGNIFICANT CHANGE UP (ref 3.8–10.5)

## 2018-04-13 PROCEDURE — 93306 TTE W/DOPPLER COMPLETE: CPT | Mod: 26

## 2018-04-13 PROCEDURE — 70551 MRI BRAIN STEM W/O DYE: CPT | Mod: 26

## 2018-04-13 RX ORDER — FUROSEMIDE 40 MG
20 TABLET ORAL DAILY
Qty: 0 | Refills: 0 | Status: DISCONTINUED | OUTPATIENT
Start: 2018-04-13 | End: 2018-04-13

## 2018-04-13 RX ORDER — ACETAMINOPHEN 500 MG
650 TABLET ORAL EVERY 6 HOURS
Qty: 0 | Refills: 0 | Status: DISCONTINUED | OUTPATIENT
Start: 2018-04-13 | End: 2018-04-13

## 2018-04-13 RX ADMIN — Medication 81 MILLIGRAM(S): at 12:26

## 2018-04-13 RX ADMIN — Medication 1000 UNIT(S): at 12:27

## 2018-04-13 RX ADMIN — ENOXAPARIN SODIUM 40 MILLIGRAM(S): 100 INJECTION SUBCUTANEOUS at 12:27

## 2018-04-13 RX ADMIN — Medication 650 MILLIGRAM(S): at 17:23

## 2018-04-13 NOTE — CHART NOTE - NSCHARTNOTEFT_GEN_A_CORE
Telemetry recording reviewed NSR with occasional APC's with transient Sinus fanny.  Patient denies palpitations.  Echo & MRA pending.  Discussed ILR indication and potential benefit.  Patient would like to hold off ILR for now.

## 2018-04-13 NOTE — PROGRESS NOTE ADULT - SUBJECTIVE AND OBJECTIVE BOX
VASCULAR NEUROLOGY ATTENDING NOTE    Patient seen and examined history and imaging reviewed. Agree with resident/fellow note as applicable.    Overnight Events:  None    VITALS:  Vital Signs Last 24 Hrs  T(C): 36.5 (13 Apr 2018 06:43), Max: 36.8 (12 Apr 2018 21:30)  T(F): 97.7 (13 Apr 2018 06:43), Max: 98.2 (12 Apr 2018 21:30)  HR: 60 (13 Apr 2018 06:43) (58 - 75)  BP: 155/80 (13 Apr 2018 06:43) (101/73 - 155/80)  BP(mean): 90 (12 Apr 2018 20:25) (73 - 95)  RR: 18 (13 Apr 2018 06:43) (16 - 21)  SpO2: 100% (13 Apr 2018 06:43) (93% - 100%)    Labs:   04-13    141  |  103  |  27<H>  ----------------------------<  87  3.8   |  27  |  0.79    Ca    8.9      13 Apr 2018 05:30  Phos  3.6     04-13  Mg     2.0     04-13    TPro  6.4  /  Alb  3.9  /  TBili  0.2  /  DBili  x   /  AST  12  /  ALT  14  /  AlkPhos  64  04-13                          13.7   5.35  )-----------( 208      ( 13 Apr 2018 05:30 )             41.3       MEDS:  aspirin  chewable 81 milliGRAM(s) Oral daily  atorvastatin 80 milliGRAM(s) Oral at bedtime  cholecalciferol 1000 Unit(s) Oral daily  enoxaparin Injectable 40 milliGRAM(s) SubCutaneous daily  influenza   Vaccine 0.5 milliLiter(s) IntraMuscular once      Exam:   MS: AAOx3, no aphasia, no neglect, nl. memory  CNs:  PERRL, EOMI, VFF, face symmetric, tongue midline, no dysarthria, no nystagmus,   Motor:  no drift, 5/5 strength throughout, MARI intact  Sensory:  intact sensation throughout, no extinction  Coord:  no dysmetria,   Gait: not tested, on bedrest

## 2018-04-13 NOTE — PROGRESS NOTE ADULT - ATTENDING COMMENTS
VASCULAR NEUROLOGY ATTENDING  The patient is seen and examined the history and imaging are reviewed. I agree with the resident note unless otherwise noted. Patient with sudden onset of memory disturbance and reported mild R weakness. Treated with IVtpa per protocol. This AM essentially neurologically normal. Overall low suspicion for cerebrovascular event suspect TGA (initial mild weakness was questionable by report and possibly effort dependant. Patient herself does not endorses weakness). At this point no objection to MRI brain but this may be performed as an outpatient. No objection to daily ASA. Will need outpatient neurologic follow-up.
Agree with above. Seen and examined with residents. Possible stroke s/p TPA.  Improved neurological function.

## 2018-04-13 NOTE — PROGRESS NOTE ADULT - SUBJECTIVE AND OBJECTIVE BOX
Subjective: Patient seen and examined. No new events except as noted.   Feels well no neuro sx  MRI negative for CVA  neuro consult appreciated no stroke  NSR  echo normal with mild diastollic dysfunction  MEDICATIONS:  MEDICATIONS  (STANDING):  aspirin  chewable 81 milliGRAM(s) Oral daily  atorvastatin 80 milliGRAM(s) Oral at bedtime  cholecalciferol 1000 Unit(s) Oral daily  enoxaparin Injectable 40 milliGRAM(s) SubCutaneous daily  influenza   Vaccine 0.5 milliLiter(s) IntraMuscular once      PHYSICAL EXAM:  T(C): 36.5 (04-13-18 @ 06:43), Max: 36.8 (04-12-18 @ 21:30)  HR: 60 (04-13-18 @ 06:43) (60 - 68)  BP: 155/80 (04-13-18 @ 06:43) (117/70 - 155/80)  RR: 18 (04-13-18 @ 06:43) (18 - 19)  SpO2: 100% (04-13-18 @ 06:43) (97% - 100%)  Wt(kg): --  I&O's Summary    12 Apr 2018 07:01  -  13 Apr 2018 07:00  --------------------------------------------------------  IN: 0 mL / OUT: 500 mL / NET: -500 mL          Appearance: Normal	  HEENT:   Normal oral mucosa, PERRL, EOMI	  Cardiovascular: Normal S1 S2, No JVD, No murmurs ,  Respiratory: Lungs clear to auscultation, normal effort 	  Psychiatry:  Mood & affect appropriate  Ext: No edema  Peripheral pulses palpable 2+ bilaterally      LABS:    CARDIAC MARKERS:  CARDIAC MARKERS ( 11 Apr 2018 11:59 )  x     / < 0.06 ng/mL / x     / x     / x                                    13.7   5.35  )-----------( 208      ( 13 Apr 2018 05:30 )             41.3     04-13    141  |  103  |  27<H>  ----------------------------<  87  3.8   |  27  |  0.79    Ca    8.9      13 Apr 2018 05:30  Phos  3.6     04-13  Mg     2.0     04-13    TPro  6.4  /  Alb  3.9  /  TBili  0.2  /  DBili  x   /  AST  12  /  ALT  14  /  AlkPhos  64  04-13      < from: MR Head No Cont (04.13.18 @ 15:31) >  IMPRESSION:    Moderate microvascular ischemic change.    No evidence of intracranial hemorrhage or acute cerebral ischemia.    JO ANN JACOBSEN M.D., RADIOLOGY RESIDENT  This document has been electronically signed.  APPLE PINO M.D., ATTENDING RADIOLOGIST  This document has been electronically signed. Apr 13 2018  4:53PM        < from: Transthoracic Echocardiogram (04.13.18 @ 16:18) >  ONCLUSIONS:  1.Moderate concentric left ventricular hypertrophy.  2. Normal left ventricular systolic function. No segmental  wall motion abnormalities.  3. Mild diastolic dysfunction (Stage I).  4. Normal right ventricular size and function.  ------------------------------------------------------------------------  Confirmed on  4/13/2018 - 17:38:40 by Caesar Elena M.D.  ------------------------------------------------------------------------

## 2018-04-13 NOTE — PROGRESS NOTE ADULT - SUBJECTIVE AND OBJECTIVE BOX
M E D I C A L   A T T E N D I N G    F O L L O W    U P   N O T E                                     ------------------------------------------------------------------------------------------------    patient evaluated by me, case discussed with team, chart, medications, and physical exam reviewed, labs / tests  and vitals reviewed by me, as margaret.   Patient is stable for discharge today.  noted all consultant notes, reviewed MRI: no acute stroke, and the echo.  Patient to follow up with  Dr Tatum.  See discharge document for full note.      ==================>> MEDICATIONS <<====================    aspirin  chewable 81 milliGRAM(s) Oral daily  atorvastatin 80 milliGRAM(s) Oral at bedtime  cholecalciferol 1000 Unit(s) Oral daily  enoxaparin Injectable 40 milliGRAM(s) SubCutaneous daily  influenza   Vaccine 0.5 milliLiter(s) IntraMuscular once    MEDICATIONS  (PRN):  acetaminophen   Tablet. 650 milliGRAM(s) Oral every 6 hours PRN mild and moderate pain    ==================>> VITAL SIGNS <<==================    T(C): 36.5 (04-13-18 @ 06:43), Max: 36.8 (04-12-18 @ 21:30)  HR: 60 (04-13-18 @ 06:43) (60 - 68)  BP: 155/80 (04-13-18 @ 06:43) (117/70 - 155/80)  RR: 18 (04-13-18 @ 06:43) (18 - 19)  SpO2: 100% (04-13-18 @ 06:43) (97% - 100%)    I&O's Summary    12 Apr 2018 07:01  -  13 Apr 2018 07:00  --------------------------------------------------------  IN: 0 mL / OUT: 500 mL / NET: -500 mL       ==================>> LAB AND IMAGING <<==================                        13.7   5.35  )-----------( 208      ( 13 Apr 2018 05:30 )             41.3        04-13    141  |  103  |  27<H>  ----------------------------<  87  3.8   |  27  |  0.79    Ca    8.9      13 Apr 2018 05:30  Phos  3.6     04-13  Mg     2.0     04-13    TPro  6.4  /  Alb  3.9  /  TBili  0.2  /  DBili  x   /  AST  12  /  ALT  14  /  AlkPhos  64  04-13                 TSH:      3.06   (04-12-18)       ,     4.03   (04-11-18)           Lipid profile:  (04-12-18)     Total: 206     LDL  : 140     HDL  :53     TG   :70     HgA1C: 5.3  (04-12-18)              < from: MR Head No Cont (04.13.18 @ 15:31) >  IMPRESSION:    Moderate microvascular ischemic change.  No evidence of intracranial hemorrhage or acute cerebral ischemia.  < end of copied text >    < from: Transthoracic Echocardiogram (04.13.18 @ 16:18) >  CONCLUSIONS:  1.Moderate concentric left ventricular hypertrophy.  2. Normal left ventricular systolic function. No segmental  wall motion abnormalities.  3. Mild diastolic dysfunction (Stage I).  4. Normal right ventricular size and function.  < end of copied text >

## 2018-06-13 ENCOUNTER — APPOINTMENT (OUTPATIENT)
Dept: GASTROENTEROLOGY | Facility: CLINIC | Age: 73
End: 2018-06-13
Payer: MEDICARE

## 2018-06-13 VITALS
HEART RATE: 79 BPM | HEIGHT: 64 IN | DIASTOLIC BLOOD PRESSURE: 78 MMHG | TEMPERATURE: 98.3 F | OXYGEN SATURATION: 98 % | SYSTOLIC BLOOD PRESSURE: 122 MMHG | RESPIRATION RATE: 16 BRPM | WEIGHT: 132 LBS | BODY MASS INDEX: 22.53 KG/M2

## 2018-06-13 DIAGNOSIS — K62.5 HEMORRHAGE OF ANUS AND RECTUM: ICD-10-CM

## 2018-06-13 DIAGNOSIS — K64.9 UNSPECIFIED HEMORRHOIDS: ICD-10-CM

## 2018-06-13 PROCEDURE — 99214 OFFICE O/P EST MOD 30 MIN: CPT

## 2018-06-19 ENCOUNTER — FORM ENCOUNTER (OUTPATIENT)
Age: 73
End: 2018-06-19

## 2018-06-20 ENCOUNTER — APPOINTMENT (OUTPATIENT)
Dept: ULTRASOUND IMAGING | Facility: IMAGING CENTER | Age: 73
End: 2018-06-20
Payer: MEDICARE

## 2018-06-20 ENCOUNTER — OUTPATIENT (OUTPATIENT)
Dept: OUTPATIENT SERVICES | Facility: HOSPITAL | Age: 73
LOS: 1 days | End: 2018-06-20
Payer: MEDICARE

## 2018-06-20 ENCOUNTER — RESULT REVIEW (OUTPATIENT)
Age: 73
End: 2018-06-20

## 2018-06-20 DIAGNOSIS — Z00.8 ENCOUNTER FOR OTHER GENERAL EXAMINATION: ICD-10-CM

## 2018-06-20 DIAGNOSIS — Z96.659 PRESENCE OF UNSPECIFIED ARTIFICIAL KNEE JOINT: Chronic | ICD-10-CM

## 2018-06-20 DIAGNOSIS — Z90.721 ACQUIRED ABSENCE OF OVARIES, UNILATERAL: Chronic | ICD-10-CM

## 2018-06-20 DIAGNOSIS — E04.2 NONTOXIC MULTINODULAR GOITER: ICD-10-CM

## 2018-06-20 DIAGNOSIS — Z98.890 OTHER SPECIFIED POSTPROCEDURAL STATES: Chronic | ICD-10-CM

## 2018-06-20 DIAGNOSIS — Z98.49 CATARACT EXTRACTION STATUS, UNSPECIFIED EYE: Chronic | ICD-10-CM

## 2018-06-20 DIAGNOSIS — Z86.39 PERSONAL HISTORY OF OTHER ENDOCRINE, NUTRITIONAL AND METABOLIC DISEASE: Chronic | ICD-10-CM

## 2018-06-20 DIAGNOSIS — Z98.891 HISTORY OF UTERINE SCAR FROM PREVIOUS SURGERY: Chronic | ICD-10-CM

## 2018-06-20 PROCEDURE — 88173 CYTOPATH EVAL FNA REPORT: CPT | Mod: 26

## 2018-06-20 PROCEDURE — 81445 SO NEO GSAP 5-50DNA/DNA&RNA: CPT

## 2018-06-20 PROCEDURE — 10022: CPT

## 2018-06-20 PROCEDURE — 88172 CYTP DX EVAL FNA 1ST EA SITE: CPT

## 2018-06-20 PROCEDURE — 88173 CYTOPATH EVAL FNA REPORT: CPT

## 2018-06-20 PROCEDURE — 76942 ECHO GUIDE FOR BIOPSY: CPT

## 2018-06-20 PROCEDURE — 76942 ECHO GUIDE FOR BIOPSY: CPT | Mod: 26

## 2018-06-22 LAB — NON-GYNECOLOGICAL CYTOLOGY STUDY: SIGNIFICANT CHANGE UP

## 2018-07-18 LAB
BLOCK/SPECIMEN ID: SIGNIFICANT CHANGE UP
BRAF GENE MUT ANL BLD/T: DETECTED
HRAS GENE MUT ANL BLD/T: SIGNIFICANT CHANGE UP
KRAS GENE MUT ANL BLD/T: SIGNIFICANT CHANGE UP
NRAS GENE MUT ANL BLD/T: SIGNIFICANT CHANGE UP
PAX8/PPARG: SIGNIFICANT CHANGE UP
RET/PTC1: SIGNIFICANT CHANGE UP
RET/PTC3: SIGNIFICANT CHANGE UP

## 2018-07-24 PROBLEM — E04.1 NONTOXIC SINGLE THYROID NODULE: Chronic | Status: ACTIVE | Noted: 2017-04-11

## 2018-07-24 PROBLEM — N94.9 UNSPECIFIED CONDITION ASSOCIATED WITH FEMALE GENITAL ORGANS AND MENSTRUAL CYCLE: Chronic | Status: ACTIVE | Noted: 2017-04-11

## 2018-07-24 PROBLEM — N84.0 POLYP OF CORPUS UTERI: Chronic | Status: ACTIVE | Noted: 2017-04-11

## 2018-07-24 PROBLEM — D64.9 ANEMIA, UNSPECIFIED: Chronic | Status: ACTIVE | Noted: 2017-04-11
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
## 2018-07-25 NOTE — PHYSICAL THERAPY INITIAL EVALUATION ADULT - ASSISTIVE DEVICE:SIT/SUPINE, REHAB EVAL
PRIMARY CARE PROVIDER: Ngoc Dougherty DO  REFERRING PROVIDER: No ref. provider found    Chief Complaint   Patient presents with   • Follow-up     right jawline mass       Subjective   History of Present Illness:  Brayan Peña is a  54 y.o. male who is here to follow-up from complaints of a mass. The symptoms are localized to the right jawline. The patient has had moderate symptoms. The symptoms have been present for the last 2 weeks. The symptoms began with an infected hair follicle after shaving. The patient had been treated with Trimethoprim/Sulfa (Bactrim) in the past with no appreciable improvement. He had also been using warm compresses without improvement. At his last visit, bloody drainage was aspirated from the mass, completely decompressing it. This was sent for culture and he was started on Clindamycin. He states the mass returned the next day. He denies fever, chills, pain, or drainage.     Review of Systems:  Review of Systems   Constitutional: Negative for chills and fever.   HENT: Negative for congestion, ear discharge, ear pain, postnasal drip, rhinorrhea, sore throat, trouble swallowing and voice change.    Musculoskeletal: Negative for neck pain and neck stiffness.   All other systems reviewed and are negative.      Past History:  Past Medical History:   Diagnosis Date   • Anxiety    • Colon polyp    • Dyslipidemia    • Erectile dysfunction    • Esophageal reflux    • GERD (gastroesophageal reflux disease)    • Headache    • Hyperlipidemia    • Hypertension    • Seasonal allergies      Past Surgical History:   Procedure Laterality Date   • CARPAL TUNNEL RELEASE     • COLONOSCOPY  03/20/2014    asc tubular adenoma, sig x2 tubular adenoma and tubulovillous adenoma diverticulitis and internal hemorrhoids   • COLONOSCOPY N/A 4/6/2017    Procedure: COLONOSCOPY WITH ANESTHESIA;  Surgeon: Halie Garcia MD;  Location: [facility] ENDOSCOPY;  Service:    • ENDOSCOPY N/A 4/6/2017    Procedure:  "ESOPHAGOGASTRODUODENOSCOPY WITH ANESTHESIA;  Surgeon: Halie Garcia MD;  Location: [facility] ENDOSCOPY;  Service:    • TONSILLECTOMY     • UPPER GASTROINTESTINAL ENDOSCOPY  03/20/2014   • VASECTOMY       Family History   Problem Relation Age of Onset   • No Known Problems Mother    • Colon polyps Father    • Colon cancer Neg Hx    • Crohn's disease Neg Hx    • Esophageal cancer Neg Hx    • Irritable bowel syndrome Neg Hx    • Liver cancer Neg Hx    • Liver disease Neg Hx    • Rectal cancer Neg Hx    • Stomach cancer Neg Hx      Social History   Substance Use Topics   • Smoking status: Former Smoker     Packs/day: 1.00     Types: Cigarettes   • Smokeless tobacco: Never Used   • Alcohol use Yes      Comment: [place]     Allergies:  Crestor [rosuvastatin calcium]; Norvasc [amlodipine besylate]; and Topiramate    Current Outpatient Prescriptions:   •  busPIRone (BUSPAR) 5 MG tablet, Take 5 mg by mouth daily., Disp: , Rfl:   •  cloNIDine (CATAPRES) 0.2 MG tablet, Take 0.2 mg by mouth 2 (two) times a day., Disp: , Rfl:   •  cyclobenzaprine (FLEXERIL) 10 MG tablet, Take 10 mg by mouth daily., Disp: , Rfl:   •  escitalopram (LEXAPRO) 20 MG tablet, Take 20 mg by mouth daily., Disp: , Rfl:   •  fenofibrate (TRICOR) 54 MG tablet, Take 54 mg by mouth daily., Disp: , Rfl:   •  niacin 500 MG tablet, Take 500 mg by mouth every night., Disp: , Rfl:   •  pantoprazole (PROTONIX) 40 MG EC tablet, Take 1 tablet by mouth daily., Disp: 30 tablet, Rfl: 11  •  Pediatric Multivit-Minerals-C (MULTIVITAMINS PEDIATRIC PO), Take  by mouth., Disp: , Rfl:   •  verapamil SR (CALAN-SR) 180 MG CR tablet, Take 180 mg by mouth every night., Disp: , Rfl:   •  ferrous sulfate 325 (65 FE) MG tablet, Take 1 tablet by mouth 2 (Two) Times a Day for 90 days., Disp: 180 tablet, Rfl: 4      Objective     Vital Signs:     /80   Pulse 78   Temp 98 °F (36.7 °C)   Resp 20   Ht 172.7 cm (68\")   Wt 84.4 kg (186 lb)   BMI 28.28 kg/m²     Physical " Exam:  Physical Exam   Constitutional: He is oriented to person, place, and time. He appears well-developed and well-nourished. He is cooperative. No distress.   HENT:   Head: Normocephalic and atraumatic.   Right Ear: External ear normal.   Left Ear: External ear normal.   Nose: Nose normal. No nasal deformity.   Mouth/Throat: Oropharynx is clear and moist.   Eyes: Pupils are equal, round, and reactive to light. Conjunctivae, EOM and lids are normal. Right eye exhibits no discharge. Left eye exhibits no discharge. No scleral icterus.   Neck: Normal range of motion and phonation normal. Neck supple. No tracheal deviation present.       Pulmonary/Chest: Effort normal. No stridor. No respiratory distress.   Musculoskeletal: Normal range of motion. He exhibits no edema or deformity.   Lymphadenopathy:     He has no cervical adenopathy.   Neurological: He is alert and oriented to person, place, and time. He has normal strength. No cranial nerve deficit. Coordination normal.   Skin: Skin is warm and dry. No lesion and no rash noted. He is not diaphoretic. No erythema. No pallor.   Psychiatric: He has a normal mood and affect. His speech is normal and behavior is normal. Judgment and thought content normal. Cognition and memory are normal.   Nursing note and vitals reviewed.                  Assessment   Assessment:  1. Mass of right side of neck    2. BMI 28.0-28.9,adult        Plan   Plan:    Continue Clindamycin. Will obtain a CT scan of the neck and follow-up after scan to discuss results. Call for any problems or worsening symptoms.     QUALITY MEASURES    Body Mass Index Screening and Follow-Up Plan  Body mass index is 28.28 kg/m².  Patient's Body mass index is 28.28 kg/m². BMI is above normal parameters. Recommendations include: educational material.    Tobacco Use: Screening and Cessation Intervention  Smoking status: Former Smoker                                                              Packs/day: 1.00       Years: 0.00         Types: Cigarettes  Smokeless tobacco: Never Used                        Return in about 2 weeks (around 8/8/2018), or if symptoms worsen or fail to improve, for Recheck.    My findings and recommendations were discussed and questions were answered.     Mayra Rod, APRN     bed rails

## 2018-08-08 ENCOUNTER — APPOINTMENT (OUTPATIENT)
Dept: SURGERY | Facility: CLINIC | Age: 73
End: 2018-08-08
Payer: MEDICARE

## 2018-08-08 PROCEDURE — 99214 OFFICE O/P EST MOD 30 MIN: CPT

## 2018-10-16 ENCOUNTER — APPOINTMENT (OUTPATIENT)
Dept: OTOLARYNGOLOGY | Facility: CLINIC | Age: 73
End: 2018-10-16

## 2018-11-06 ENCOUNTER — APPOINTMENT (OUTPATIENT)
Dept: OTOLARYNGOLOGY | Facility: CLINIC | Age: 73
End: 2018-11-06
Payer: MEDICARE

## 2018-11-06 PROCEDURE — 99204 OFFICE O/P NEW MOD 45 MIN: CPT

## 2019-02-14 NOTE — ED PROVIDER NOTE - CONTACT TIME
Pt verbalizes understanding of discharge and follow-up instructions.  PIV removed.  Given Rx X1.  VSS.  All questions answered.  Ambulates to discharge with steady gait.     11-Apr-2018 11:36

## 2019-02-20 ENCOUNTER — APPOINTMENT (OUTPATIENT)
Dept: GASTROENTEROLOGY | Facility: CLINIC | Age: 74
End: 2019-02-20

## 2019-02-26 ENCOUNTER — APPOINTMENT (OUTPATIENT)
Dept: GASTROENTEROLOGY | Facility: CLINIC | Age: 74
End: 2019-02-26
Payer: MEDICARE

## 2019-02-26 VITALS
DIASTOLIC BLOOD PRESSURE: 70 MMHG | TEMPERATURE: 98.5 F | OXYGEN SATURATION: 98 % | SYSTOLIC BLOOD PRESSURE: 110 MMHG | WEIGHT: 132 LBS | BODY MASS INDEX: 22.53 KG/M2 | HEIGHT: 64 IN | HEART RATE: 65 BPM

## 2019-02-26 DIAGNOSIS — Z09 ENCOUNTER FOR FOLLOW-UP EXAMINATION AFTER COMPLETED TREATMENT FOR CONDITIONS OTHER THAN MALIGNANT NEOPLASM: ICD-10-CM

## 2019-02-26 DIAGNOSIS — K58.1 IRRITABLE BOWEL SYNDROME WITH CONSTIPATION: ICD-10-CM

## 2019-02-26 PROCEDURE — 99214 OFFICE O/P EST MOD 30 MIN: CPT

## 2019-02-26 NOTE — PHYSICAL EXAM
[General Appearance - Alert] : alert [General Appearance - In No Acute Distress] : in no acute distress [Sclera] : the sclera and conjunctiva were normal [PERRL With Normal Accommodation] : pupils were equal in size, round, and reactive to light [Extraocular Movements] : extraocular movements were intact [Outer Ear] : the ears and nose were normal in appearance [Oropharynx] : the oropharynx was normal [FreeTextEntry1] : R thyroid nodule [Auscultation Breath Sounds / Voice Sounds] : lungs were clear to auscultation bilaterally [Heart Rate And Rhythm] : heart rate was normal and rhythm regular [Heart Sounds] : normal S1 and S2 [Heart Sounds Gallop] : no gallops [Murmurs] : no murmurs [Heart Sounds Pericardial Friction Rub] : no pericardial rub [Bowel Sounds] : normal bowel sounds [Abdomen Soft] : soft [Abdomen Tenderness] : non-tender [] : no hepato-splenomegaly [Abdomen Mass (___ Cm)] : no abdominal mass palpated [No CVA Tenderness] : no ~M costovertebral angle tenderness [No Spinal Tenderness] : no spinal tenderness [Abnormal Walk] : normal gait [Nail Clubbing] : no clubbing  or cyanosis of the fingernails [Musculoskeletal - Swelling] : no joint swelling seen [Motor Tone] : muscle strength and tone were normal [Oriented To Time, Place, And Person] : oriented to person, place, and time [Impaired Insight] : insight and judgment were intact [Affect] : the affect was normal

## 2019-02-26 NOTE — ASSESSMENT
[FreeTextEntry1] : Patient with history of colon polyps. She has a flat tubular adenoma that was tattooed in the sigmoid on previous colonoscopy. She will be scheduled for colonoscopy and possible polypectomy.\par She also has a large right thyroid nodule which will be surgically removed after her colonoscopy.

## 2019-03-07 ENCOUNTER — APPOINTMENT (OUTPATIENT)
Dept: GASTROENTEROLOGY | Facility: AMBULATORY MEDICAL SERVICES | Age: 74
End: 2019-03-07
Payer: MEDICARE

## 2019-03-07 PROCEDURE — 45380 COLONOSCOPY AND BIOPSY: CPT | Mod: 59,PT

## 2019-03-07 PROCEDURE — 45385 COLONOSCOPY W/LESION REMOVAL: CPT | Mod: PT

## 2019-03-07 PROCEDURE — 45381 COLONOSCOPY SUBMUCOUS NJX: CPT | Mod: 59,PT

## 2019-03-11 ENCOUNTER — APPOINTMENT (OUTPATIENT)
Dept: SURGERY | Facility: CLINIC | Age: 74
End: 2019-03-11
Payer: MEDICARE

## 2019-03-11 PROCEDURE — 99214 OFFICE O/P EST MOD 30 MIN: CPT

## 2019-03-11 NOTE — HISTORY OF PRESENT ILLNESS
[de-identified] : patient denies dysphagia change in voice or recent illness. Question of enlargement of right nodule. Last ultrasound September 2016 With slight variation in size with nodules..Last biopsied April 2016 benign. Patient denies recent illness. Patient denies hospitalization  F/U US 3/17 with slight increase in one nodule.   Denies recent illness but c/o joint pain with elevated CHASITY. US 10/2017 stable denies recent illness. repeat FNA AUS with BRAF mutation.  returns to discuss surgery. multiple questions answered. patient with chronic neck pain, obtained neurological clearance  now ready to schedule

## 2019-03-11 NOTE — ASSESSMENT
[FreeTextEntry1] : recommend total thyroidectomy, with intraop  neurophysiological monitoring    I have discussed the risks, benefits and alternative treatments which include but are not limited to bleeding, infection, numbness, hoarseness, hypocalcemia, scarring, and need for reoperation. I have answered the patient's questions. They will contact my office to schedule surgery. I have discussed increased risk of recurrent laryngeal nerve injury due to prior surgery.

## 2019-03-11 NOTE — PHYSICAL EXAM
[de-identified] : No cervical or supraclavicular adenopathy, dominant Right nodule 3.4 CM smooth nontender.  [Normal] : orientation to person, place, and time: normal

## 2019-03-13 ENCOUNTER — OUTPATIENT (OUTPATIENT)
Dept: OUTPATIENT SERVICES | Facility: HOSPITAL | Age: 74
LOS: 1 days | End: 2019-03-13
Payer: MEDICARE

## 2019-03-13 VITALS
OXYGEN SATURATION: 96 % | TEMPERATURE: 98 F | HEIGHT: 64 IN | RESPIRATION RATE: 16 BRPM | DIASTOLIC BLOOD PRESSURE: 80 MMHG | HEART RATE: 52 BPM | WEIGHT: 128.97 LBS | SYSTOLIC BLOOD PRESSURE: 140 MMHG

## 2019-03-13 DIAGNOSIS — E04.2 NONTOXIC MULTINODULAR GOITER: ICD-10-CM

## 2019-03-13 DIAGNOSIS — Z90.721 ACQUIRED ABSENCE OF OVARIES, UNILATERAL: Chronic | ICD-10-CM

## 2019-03-13 DIAGNOSIS — Z96.659 PRESENCE OF UNSPECIFIED ARTIFICIAL KNEE JOINT: Chronic | ICD-10-CM

## 2019-03-13 DIAGNOSIS — G45.4 TRANSIENT GLOBAL AMNESIA: ICD-10-CM

## 2019-03-13 DIAGNOSIS — R94.31 ABNORMAL ELECTROCARDIOGRAM [ECG] [EKG]: ICD-10-CM

## 2019-03-13 DIAGNOSIS — Z98.890 OTHER SPECIFIED POSTPROCEDURAL STATES: Chronic | ICD-10-CM

## 2019-03-13 DIAGNOSIS — Z86.39 PERSONAL HISTORY OF OTHER ENDOCRINE, NUTRITIONAL AND METABOLIC DISEASE: Chronic | ICD-10-CM

## 2019-03-13 DIAGNOSIS — Z98.891 HISTORY OF UTERINE SCAR FROM PREVIOUS SURGERY: Chronic | ICD-10-CM

## 2019-03-13 DIAGNOSIS — Z98.49 CATARACT EXTRACTION STATUS, UNSPECIFIED EYE: Chronic | ICD-10-CM

## 2019-03-13 LAB
ANION GAP SERPL CALC-SCNC: 12 MMO/L — SIGNIFICANT CHANGE UP (ref 7–14)
BUN SERPL-MCNC: 16 MG/DL — SIGNIFICANT CHANGE UP (ref 7–23)
CALCIUM SERPL-MCNC: 9.5 MG/DL — SIGNIFICANT CHANGE UP (ref 8.4–10.5)
CHLORIDE SERPL-SCNC: 102 MMOL/L — SIGNIFICANT CHANGE UP (ref 98–107)
CO2 SERPL-SCNC: 30 MMOL/L — SIGNIFICANT CHANGE UP (ref 22–31)
CREAT SERPL-MCNC: 0.7 MG/DL — SIGNIFICANT CHANGE UP (ref 0.5–1.3)
GLUCOSE SERPL-MCNC: 67 MG/DL — LOW (ref 70–99)
HCT VFR BLD CALC: 42.4 % — SIGNIFICANT CHANGE UP (ref 34.5–45)
HGB BLD-MCNC: 13.6 G/DL — SIGNIFICANT CHANGE UP (ref 11.5–15.5)
MCHC RBC-ENTMCNC: 28.7 PG — SIGNIFICANT CHANGE UP (ref 27–34)
MCHC RBC-ENTMCNC: 32.1 % — SIGNIFICANT CHANGE UP (ref 32–36)
MCV RBC AUTO: 89.5 FL — SIGNIFICANT CHANGE UP (ref 80–100)
NRBC # FLD: 0 K/UL — LOW (ref 25–125)
PLATELET # BLD AUTO: 179 K/UL — SIGNIFICANT CHANGE UP (ref 150–400)
PMV BLD: 10.5 FL — SIGNIFICANT CHANGE UP (ref 7–13)
POTASSIUM SERPL-MCNC: 4 MMOL/L — SIGNIFICANT CHANGE UP (ref 3.5–5.3)
POTASSIUM SERPL-SCNC: 4 MMOL/L — SIGNIFICANT CHANGE UP (ref 3.5–5.3)
RBC # BLD: 4.74 M/UL — SIGNIFICANT CHANGE UP (ref 3.8–5.2)
RBC # FLD: 12.7 % — SIGNIFICANT CHANGE UP (ref 10.3–14.5)
SODIUM SERPL-SCNC: 144 MMOL/L — SIGNIFICANT CHANGE UP (ref 135–145)
WBC # BLD: 3.64 K/UL — LOW (ref 3.8–10.5)
WBC # FLD AUTO: 3.64 K/UL — LOW (ref 3.8–10.5)

## 2019-03-13 PROCEDURE — 93010 ELECTROCARDIOGRAM REPORT: CPT

## 2019-03-13 RX ORDER — SODIUM CHLORIDE 9 MG/ML
3 INJECTION INTRAMUSCULAR; INTRAVENOUS; SUBCUTANEOUS ONCE
Qty: 0 | Refills: 0 | Status: DISCONTINUED | OUTPATIENT
Start: 2019-04-02 | End: 2019-04-03

## 2019-03-13 NOTE — H&P PST ADULT - RS GEN PE MLT RESP DETAILS PC
airway patent/breath sounds equal/no chest wall tenderness/good air movement/clear to auscultation bilaterally/respirations non-labored

## 2019-03-13 NOTE — H&P PST ADULT - NEGATIVE ENMT SYMPTOMS
no ear pain/no nose bleeds/no nasal congestion/no tinnitus/no dysphagia/no vertigo/no dry mouth/no hearing difficulty

## 2019-03-13 NOTE — H&P PST ADULT - NSICDXFAMILYHX_GEN_ALL_CORE_FT
FAMILY HISTORY:  FHx: breast cancer, twin sister and aunt    Grandparent  Still living? Unknown  Family history of stroke, Age at diagnosis: Age Unknown

## 2019-03-13 NOTE — H&P PST ADULT - NSANTHOSAYNRD_GEN_A_CORE
No. JAMESON screening performed.  STOP BANG Legend: 0-2 = LOW Risk; 3-4 = INTERMEDIATE Risk; 5-8 = HIGH Risk

## 2019-03-13 NOTE — H&P PST ADULT - NEGATIVE NEUROLOGICAL SYMPTOMS
no loss of consciousness/no hemiparesis/no headache/no confusion/no facial palsy/no tremors/no transient paralysis/no weakness/no focal seizures/no syncope/no difficulty walking/no paresthesias/no generalized seizures/no vertigo/no loss of sensation

## 2019-03-13 NOTE — H&P PST ADULT - NSICDXPASTSURGICALHX_GEN_ALL_CORE_FT
PAST SURGICAL HISTORY:  H/O thyroid nodule s/p excision     H/O unilateral oophorectomy 2017, left    S/P      S/P cataract extraction b/l    S/P colonoscopy with polypectomy     S/P hernia repair years ago; b/l inguinal    S/P knee replacement left     S/P oophorectomy right, in her 20s    S/P unilateral salpingo-oophorectomy left PAST SURGICAL HISTORY:  H/O thyroid nodule s/p excision     S/P      S/P cataract extraction b/l    S/P colonoscopy with polypectomy     S/P hernia repair years ago; b/l inguinal    S/P knee replacement left     S/P oophorectomy right, in her 20s    S/P unilateral salpingo-oophorectomy left

## 2019-03-13 NOTE — H&P PST ADULT - NSICDXPASTMEDICALHX_GEN_ALL_CORE_FT
PAST MEDICAL HISTORY:  Adnexal cyst b/l    Amnesia, global, transient April 2018- no residuals. Followed by Neuro Dr. German    Anemia     Fall s/p fall in 2013 with neck, pain and knee injury    Nontoxic multinodular goiter     Thyroid nodule     Uterine polyp

## 2019-03-13 NOTE — H&P PST ADULT - NSICDXPROBLEM_GEN_ALL_CORE_FT
PROBLEM DIAGNOSES  Problem: Nontoxic multinodular goiter  Assessment and Plan: Scheduled for Total Thyroidectomy on 4/2/2019.   Preop instructions given, pt verbalized understanding   Chlorhexidine wash and GI prophylaxis provided     Problem: Abnormal EKG  Assessment and Plan: Medical/Cardiac clearance requested- Pt will make an appt to see Dr. San next week   PST labs and EKG to be faxed   Comparison EKG, ECHO and Stress Test reports requested     Problem: TGA (transient global amnesia)  Assessment and Plan: Neuro Clearance Requested- Pt seen by Dr. German 1 week ago

## 2019-03-13 NOTE — H&P PST ADULT - HISTORY OF PRESENT ILLNESS
74 y/o female presents to [facility] with diagnosis of nontoxic multinodular goiter. h/o right thyroid goiter for >5 years. Pt states she was monitored every 6 months but recently goiter  increased significantly in size. s/p thyroid biopsy in July 2018 which revealed "precancerous cells". Surgery indicated. Scheduled for Total Thyroidectomy on 4/2/2019. 74 y/o female presents to [facility] with diagnosis of nontoxic multinodular goiter. h/o right thyroid goiter for >5 years. Pt states she was monitored every 6 months but recently goiter increased significantly in size. s/p thyroid biopsy in July 2018 which revealed "precancerous cells". Surgery indicated. Scheduled for Total Thyroidectomy on 4/2/2019.

## 2019-03-14 PROBLEM — G45.4 TRANSIENT GLOBAL AMNESIA: Chronic | Status: ACTIVE | Noted: 2019-03-13

## 2019-03-25 PROBLEM — E04.2 NONTOXIC MULTINODULAR GOITER: Chronic | Status: ACTIVE | Noted: 2019-03-13

## 2019-03-27 ENCOUNTER — APPOINTMENT (OUTPATIENT)
Dept: CARDIOTHORACIC SURGERY | Facility: CLINIC | Age: 74
End: 2019-03-27
Payer: MEDICARE

## 2019-03-27 VITALS
OXYGEN SATURATION: 94 % | HEART RATE: 70 BPM | DIASTOLIC BLOOD PRESSURE: 81 MMHG | TEMPERATURE: 98.3 F | RESPIRATION RATE: 12 BRPM | SYSTOLIC BLOOD PRESSURE: 125 MMHG

## 2019-03-27 VITALS — WEIGHT: 130 LBS | BODY MASS INDEX: 22.2 KG/M2 | HEIGHT: 64 IN

## 2019-03-27 PROCEDURE — 99213 OFFICE O/P EST LOW 20 MIN: CPT

## 2019-03-27 PROCEDURE — 99204 OFFICE O/P NEW MOD 45 MIN: CPT

## 2019-03-27 RX ORDER — SODIUM PICOSULFATE, MAGNESIUM OXIDE, AND ANHYDROUS CITRIC ACID 10; 3.5; 12 MG/160ML; G/160ML; G/160ML
10-3.5-12 MG-GM LIQUID ORAL
Qty: 1 | Refills: 0 | Status: COMPLETED | COMMUNITY
Start: 2019-02-26 | End: 2019-03-27

## 2019-03-27 NOTE — ASSESSMENT
[FreeTextEntry1] : Jazmyne Gonzales is a 73 year old female presenting for a consultation for aortic insufficiency. \par Her past medical history includes nontoxic multinodular goiter. h/o right thyroid goiter for >5 years  monitored every 6 months but recently goiter increased significantly in size. s/p thyroid biopsy in July 2018  revealed "precancerous cells". and scheduled for Total Thyroidectomy on 4/2/2019.  Her medical history is also significant for Coronary spasms and hypertension. She was admitted to the ED in 4/2018 and had slight pronator drift on the right side and she received TPA. She was diagnosed with transient global amnesia. \par \par I reviewed the cardiac imaging, medical records and reports with patient. Moderate AI, normal LV function, normal LV size. There is no indication for valve surgery. I would recommend repeat echocardiogram in six months. I discussed treating her AI with after load reduction however I will defer to her cardiologist for this. \par \par

## 2019-03-27 NOTE — DATA REVIEWED
[FreeTextEntry1] : Echocardiogram demonstrated.\par concentric left ventricular hypertrophy with normal systolic function, EF 55%\par calcified aortic valve with normal opening and moderate AI\par mild to moderate MR\par mild tricuspid regurgitation\par LA 3.3 cm\par LVIDd 4.5 cm\par LVIDs 3.0 cm\par \par

## 2019-03-27 NOTE — CONSULT LETTER
[Dear  ___] : Dear ~MISHEL, [Consult Letter:] : I had the pleasure of evaluating your patient, [unfilled]. [Please see my note below.] : Please see my note below. [Consult Closing:] : Thank you very much for allowing me to participate in the care of this patient.  If you have any questions, please do not hesitate to contact me. [Sincerely,] : Sincerely, [FreeTextEntry2] : Deshawn Tatum MD\par [address]\par [place] [id number] [FreeTextEntry1] : She has mild WOODWARD and her echo shows Mod AI and normal LV size and function.  No surgical ins=dication at this time .  Because she had an echo 1 year ago and no AI was mentioned I would suggest a follow up echo in 6 mths . [FreeTextEntry3] : Hai Dickson MD, FACS\par Attending Surgeon\par Cardiovascular  & Thoracic Surgery\par \par \par [facility].\par \par

## 2019-03-27 NOTE — HISTORY OF PRESENT ILLNESS
[FreeTextEntry1] : Jazmyne Gonzales is a 73 year old female presenting for a consultation for aortic insufficiency. \par Her past medical history includes nontoxic multinodular goiter. h/o right thyroid goiter for >5 years  monitored every 6 months but recently goiter increased significantly in size. s/p thyroid biopsy in July 2018  revealed "precancerous cells". and scheduled for Total Thyroidectomy on 4/2/2019.  Her medical history is also significant for Coronary spasms and hypertension. She was admitted to the ED in 4/2018 and had slight pronator drift on the right side and she received TPA. She was diagnosed with transient global amnesia. \par

## 2019-03-27 NOTE — PHYSICAL EXAM
[General Appearance - Alert] : alert [General Appearance - In No Acute Distress] : in no acute distress [Sclera] : the sclera and conjunctiva were normal [PERRL With Normal Accommodation] : pupils were equal in size, round, and reactive to light [Extraocular Movements] : extraocular movements were intact [Outer Ear] : the ears and nose were normal in appearance [Oropharynx] : the oropharynx was normal [Jugular Venous Distention Increased] : there was no jugular-venous distention [Respiration, Rhythm And Depth] : normal respiratory rhythm and effort [Heart Rate And Rhythm] : heart rate was normal and rhythm regular [Diastolic Grade ___/4] : A grade [unfilled]/4 diastolic murmur was heard. [Examination Of The Chest] : the chest was normal in appearance [Edema] : there was no peripheral edema [Veins - Varicosity Changes] : there were no varicosital changes [Breast Appearance] : normal in appearance [Bowel Sounds] : normal bowel sounds [Abdomen Soft] : soft [Abdomen Tenderness] : non-tender [Cervical Lymph Nodes Enlarged Posterior Bilaterally] : posterior cervical [Cervical Lymph Nodes Enlarged Anterior Bilaterally] : anterior cervical [No CVA Tenderness] : no ~M costovertebral angle tenderness [Abnormal Walk] : normal gait [Musculoskeletal - Swelling] : no joint swelling seen [Skin Color & Pigmentation] : normal skin color and pigmentation [No Focal Deficits] : no focal deficits [Oriented To Time, Place, And Person] : oriented to person, place, and time [Impaired Insight] : insight and judgment were intact [FreeTextEntry1] : deferred

## 2019-04-01 ENCOUNTER — TRANSCRIPTION ENCOUNTER (OUTPATIENT)
Age: 74
End: 2019-04-01

## 2019-04-02 ENCOUNTER — APPOINTMENT (OUTPATIENT)
Dept: SURGERY | Facility: HOSPITAL | Age: 74
End: 2019-04-02

## 2019-04-02 ENCOUNTER — OUTPATIENT (OUTPATIENT)
Dept: OUTPATIENT SERVICES | Facility: HOSPITAL | Age: 74
LOS: 1 days | Discharge: HOME | End: 2019-04-02
Payer: MEDICARE

## 2019-04-02 ENCOUNTER — RESULT REVIEW (OUTPATIENT)
Age: 74
End: 2019-04-02

## 2019-04-02 VITALS
RESPIRATION RATE: 18 BRPM | SYSTOLIC BLOOD PRESSURE: 136 MMHG | DIASTOLIC BLOOD PRESSURE: 68 MMHG | OXYGEN SATURATION: 100 % | HEART RATE: 61 BPM

## 2019-04-02 VITALS
RESPIRATION RATE: 14 BRPM | HEIGHT: 64 IN | TEMPERATURE: 98 F | DIASTOLIC BLOOD PRESSURE: 56 MMHG | WEIGHT: 128.97 LBS | SYSTOLIC BLOOD PRESSURE: 165 MMHG | HEART RATE: 58 BPM

## 2019-04-02 DIAGNOSIS — Z96.659 PRESENCE OF UNSPECIFIED ARTIFICIAL KNEE JOINT: Chronic | ICD-10-CM

## 2019-04-02 DIAGNOSIS — Z86.39 PERSONAL HISTORY OF OTHER ENDOCRINE, NUTRITIONAL AND METABOLIC DISEASE: Chronic | ICD-10-CM

## 2019-04-02 DIAGNOSIS — Z98.890 OTHER SPECIFIED POSTPROCEDURAL STATES: Chronic | ICD-10-CM

## 2019-04-02 DIAGNOSIS — Z98.891 HISTORY OF UTERINE SCAR FROM PREVIOUS SURGERY: Chronic | ICD-10-CM

## 2019-04-02 DIAGNOSIS — E04.2 NONTOXIC MULTINODULAR GOITER: ICD-10-CM

## 2019-04-02 DIAGNOSIS — Z90.721 ACQUIRED ABSENCE OF OVARIES, UNILATERAL: Chronic | ICD-10-CM

## 2019-04-02 DIAGNOSIS — Z98.49 CATARACT EXTRACTION STATUS, UNSPECIFIED EYE: Chronic | ICD-10-CM

## 2019-04-02 PROCEDURE — 88307 TISSUE EXAM BY PATHOLOGIST: CPT | Mod: 26

## 2019-04-02 PROCEDURE — 11426 EXC H-F-NK-SP B9+MARG >4 CM: CPT | Mod: 59

## 2019-04-02 PROCEDURE — 88304 TISSUE EXAM BY PATHOLOGIST: CPT | Mod: 26

## 2019-04-02 PROCEDURE — 60271 REMOVAL OF THYROID: CPT

## 2019-04-02 RX ORDER — SODIUM CHLORIDE 9 MG/ML
1000 INJECTION, SOLUTION INTRAVENOUS
Qty: 0 | Refills: 0 | Status: DISCONTINUED | OUTPATIENT
Start: 2019-04-02 | End: 2019-04-03

## 2019-04-02 RX ORDER — SODIUM CHLORIDE 9 MG/ML
1000 INJECTION, SOLUTION INTRAVENOUS
Qty: 0 | Refills: 0 | Status: DISCONTINUED | OUTPATIENT
Start: 2019-04-02 | End: 2019-04-02

## 2019-04-02 RX ORDER — BENZOCAINE AND MENTHOL 5; 1 G/100ML; G/100ML
1 LIQUID ORAL
Qty: 0 | Refills: 0 | DISCHARGE
Start: 2019-04-02

## 2019-04-02 RX ORDER — ONDANSETRON 8 MG/1
4 TABLET, FILM COATED ORAL ONCE
Qty: 0 | Refills: 0 | Status: COMPLETED | OUTPATIENT
Start: 2019-04-02 | End: 2019-04-02

## 2019-04-02 RX ORDER — ACETAMINOPHEN 500 MG
2 TABLET ORAL
Qty: 0 | Refills: 0 | DISCHARGE
Start: 2019-04-02

## 2019-04-02 RX ORDER — BENZOCAINE AND MENTHOL 5; 1 G/100ML; G/100ML
1 LIQUID ORAL
Qty: 0 | Refills: 0 | Status: DISCONTINUED | OUTPATIENT
Start: 2019-04-02 | End: 2019-04-03

## 2019-04-02 RX ORDER — ACETAMINOPHEN 500 MG
650 TABLET ORAL EVERY 6 HOURS
Qty: 0 | Refills: 0 | Status: DISCONTINUED | OUTPATIENT
Start: 2019-04-02 | End: 2019-04-03

## 2019-04-02 RX ADMIN — BENZOCAINE AND MENTHOL 1 LOZENGE: 5; 1 LIQUID ORAL at 13:40

## 2019-04-02 RX ADMIN — SODIUM CHLORIDE 75 MILLILITER(S): 9 INJECTION, SOLUTION INTRAVENOUS at 12:00

## 2019-04-02 RX ADMIN — ONDANSETRON 4 MILLIGRAM(S): 8 TABLET, FILM COATED ORAL at 15:20

## 2019-04-02 RX ADMIN — Medication 2 TABLET(S): at 15:59

## 2019-04-02 NOTE — ASU DISCHARGE PLAN (ADULT/PEDIATRIC) - CARE PROVIDER_API CALL
Elodia Flores)  Surgery  [address] [place] [address]  Phone: [phone]  Fax: [phone]  Follow Up Time:

## 2019-04-02 NOTE — ASU DISCHARGE PLAN (ADULT/PEDIATRIC) - NURSING INSTRUCTIONS
You were given intravenous TYLENOL for pain management at 10:00 AM. Please DO NOT take any products containing TYLENOL or ACETAMINOPHEN, such as VICODIN, PERCOCET, EXCEDRIN, and any over-the-counter cold medication for the next 6 hours (until 4:00 PM). DO NOT TAKE MORE THAN 3000 MG OF TYLENOL in a 24 hour period.

## 2019-04-02 NOTE — ASU DISCHARGE PLAN (ADULT/PEDIATRIC) - ASU DC SPECIAL INSTRUCTIONSFT
Dr Flores call for time 4/10/19 appt Please come to Dr. Flores's office to half drain removed on 4/3/2019 at 3 PM  Schedule next follow up appointment for 4/10/19 once in office Please come to Dr. Flores's office to have drain removed on 4/3/2019 at 3 PM  Schedule next follow up appointment for 4/10/19 once in office

## 2019-04-03 ENCOUNTER — APPOINTMENT (OUTPATIENT)
Dept: SURGERY | Facility: CLINIC | Age: 74
End: 2019-04-03
Payer: MEDICARE

## 2019-04-03 PROCEDURE — 99024 POSTOP FOLLOW-UP VISIT: CPT

## 2019-04-03 NOTE — PHYSICAL EXAM
[de-identified] : No cervical or supraclavicular adenopathy,dressing intact, george removed, small hematoma over manubrium no swelling in neck.  [Normal] : orientation to person, place, and time: normal

## 2019-04-03 NOTE — HISTORY OF PRESENT ILLNESS
[de-identified] : patient denies dysphagia change in voice or recent illness. Question of enlargement of right nodule. Last ultrasound September 2016 With slight variation in size with nodules..Last biopsied April 2016 benign. Patient denies recent illness. Patient denies hospitalization  F/U US 3/17 with slight increase in one nodule.   Denies recent illness but c/o joint pain with elevated CHASITY. US 10/2017 stable denies recent illness. repeat FNA AUS with BRAF mutation.  returns to discuss surgery. multiple questions answered. patient with chronic neck pain, obtained neurological clearance  now ready to schedule\par 4/2/19 Total thyroidectomy .  for drain removal.  denies dysphagia, hoarseness or parathesias.

## 2019-04-09 LAB — SURGICAL PATHOLOGY STUDY: SIGNIFICANT CHANGE UP

## 2019-04-10 ENCOUNTER — APPOINTMENT (OUTPATIENT)
Dept: SURGERY | Facility: CLINIC | Age: 74
End: 2019-04-10
Payer: MEDICARE

## 2019-04-10 ENCOUNTER — APPOINTMENT (OUTPATIENT)
Dept: GASTROENTEROLOGY | Facility: CLINIC | Age: 74
End: 2019-04-10

## 2019-04-10 PROCEDURE — 99024 POSTOP FOLLOW-UP VISIT: CPT

## 2019-04-10 NOTE — PHYSICAL EXAM
[de-identified] : No cervical or supraclavicular adenopathy,incsiion healing  [Normal] : orientation to person, place, and time: normal

## 2019-04-10 NOTE — HISTORY OF PRESENT ILLNESS
[de-identified] : patient denies dysphagia change in voice or recent illness. Question of enlargement of right nodule. Last ultrasound September 2016 With slight variation in size with nodules..Last biopsied April 2016 benign. Patient denies recent illness. Patient denies hospitalization  F/U US 3/17 with slight increase in one nodule.   Denies recent illness but c/o joint pain with elevated CHASITY. US 10/2017 stable denies recent illness. repeat FNA AUS with BRAF mutation.  returns to discuss surgery. multiple questions answered. patient with chronic neck pain, obtained neurological clearance  now ready to schedule\par 4/2/19 Total thyroidectomy .4.2 cm papillary thyroid cancer.   denies dysphagia, hoarseness or parathesias.

## 2019-04-26 NOTE — ASU PREOP CHECKLIST - LOOSE TEETH
PATIENT CAME IN FOR RIGHT LEG PAIN, PATIENT ASSISTED TO BED, KEPT COMFORTABLE, 
VSS. WAITING FOR MD VALIENTE.
Patient discharged to home in stable condition. Written and verbal after care 
instructions given. Patient verbalizes understanding of instruction.
no

## 2019-05-06 ENCOUNTER — OUTPATIENT (OUTPATIENT)
Dept: OUTPATIENT SERVICES | Facility: HOSPITAL | Age: 74
LOS: 1 days | End: 2019-05-06
Payer: MEDICARE

## 2019-05-06 ENCOUNTER — APPOINTMENT (OUTPATIENT)
Dept: NUCLEAR MEDICINE | Facility: HOSPITAL | Age: 74
End: 2019-05-06
Payer: MEDICARE

## 2019-05-06 DIAGNOSIS — Z90.721 ACQUIRED ABSENCE OF OVARIES, UNILATERAL: Chronic | ICD-10-CM

## 2019-05-06 DIAGNOSIS — Z86.39 PERSONAL HISTORY OF OTHER ENDOCRINE, NUTRITIONAL AND METABOLIC DISEASE: Chronic | ICD-10-CM

## 2019-05-06 DIAGNOSIS — Z96.659 PRESENCE OF UNSPECIFIED ARTIFICIAL KNEE JOINT: Chronic | ICD-10-CM

## 2019-05-06 DIAGNOSIS — Z98.891 HISTORY OF UTERINE SCAR FROM PREVIOUS SURGERY: Chronic | ICD-10-CM

## 2019-05-06 DIAGNOSIS — C73 MALIGNANT NEOPLASM OF THYROID GLAND: ICD-10-CM

## 2019-05-06 DIAGNOSIS — Z98.890 OTHER SPECIFIED POSTPROCEDURAL STATES: Chronic | ICD-10-CM

## 2019-05-06 DIAGNOSIS — Z98.49 CATARACT EXTRACTION STATUS, UNSPECIFIED EYE: Chronic | ICD-10-CM

## 2019-05-06 PROCEDURE — 99203 OFFICE O/P NEW LOW 30 MIN: CPT

## 2019-05-07 ENCOUNTER — APPOINTMENT (OUTPATIENT)
Dept: NUCLEAR MEDICINE | Facility: HOSPITAL | Age: 74
End: 2019-05-07

## 2019-05-07 ENCOUNTER — FORM ENCOUNTER (OUTPATIENT)
Age: 74
End: 2019-05-07

## 2019-05-08 ENCOUNTER — APPOINTMENT (OUTPATIENT)
Dept: NUCLEAR MEDICINE | Facility: HOSPITAL | Age: 74
End: 2019-05-08

## 2019-05-08 PROCEDURE — 79005 NUCLEAR RX ORAL ADMIN: CPT | Mod: 26

## 2019-05-14 ENCOUNTER — FORM ENCOUNTER (OUTPATIENT)
Age: 74
End: 2019-05-14

## 2019-05-15 ENCOUNTER — APPOINTMENT (OUTPATIENT)
Dept: NUCLEAR MEDICINE | Facility: HOSPITAL | Age: 74
End: 2019-05-15

## 2019-05-15 PROCEDURE — 78018 THYROID MET IMAGING BODY: CPT

## 2019-05-15 PROCEDURE — 96372 THER/PROPH/DIAG INJ SC/IM: CPT

## 2019-05-15 PROCEDURE — 78018 THYROID MET IMAGING BODY: CPT | Mod: 26

## 2019-05-15 PROCEDURE — 79005 NUCLEAR RX ORAL ADMIN: CPT

## 2019-05-15 PROCEDURE — A9517: CPT

## 2019-05-21 ENCOUNTER — LABORATORY RESULT (OUTPATIENT)
Age: 74
End: 2019-05-21

## 2019-05-22 ENCOUNTER — APPOINTMENT (OUTPATIENT)
Dept: SURGERY | Facility: CLINIC | Age: 74
End: 2019-05-22
Payer: MEDICARE

## 2019-05-22 PROCEDURE — 99024 POSTOP FOLLOW-UP VISIT: CPT

## 2019-05-22 PROCEDURE — 36415 COLL VENOUS BLD VENIPUNCTURE: CPT

## 2019-05-22 NOTE — HISTORY OF PRESENT ILLNESS
[de-identified] : patient denies dysphagia change in voice or recent illness. Question of enlargement of right nodule. Last ultrasound September 2016 With slight variation in size with nodules..Last biopsied April 2016 benign. Patient denies recent illness. Patient denies hospitalization  F/U US 3/17 with slight increase in one nodule.   Denies recent illness but c/o joint pain with elevated CHASITY. US 10/2017 stable denies recent illness. repeat FNA AUS with BRAF mutation.  returns to discuss surgery. multiple questions answered. patient with chronic neck pain, obtained neurological clearance  now ready to schedule\par 4/2/19 Total thyroidectomy .4.2 cm papillary thyroid cancer.   denies dysphagia, hoarseness or parathesias. treated with 128 MCI 5/2019 with minimal uptake,  reports fatigue and weight gain on 50 MCG

## 2019-05-28 LAB
T3 SERPL-MCNC: 48 NG/DL
T4 FREE SERPL-MCNC: 0.8 NG/DL
THYROGLOB AB SERPL-ACNC: <20 IU/ML
THYROGLOB SERPL-MCNC: 12.2 NG/ML
TSH SERPL-ACNC: 63.3 UIU/ML

## 2019-06-19 ENCOUNTER — LABORATORY RESULT (OUTPATIENT)
Age: 74
End: 2019-06-19

## 2019-06-19 ENCOUNTER — APPOINTMENT (OUTPATIENT)
Dept: SURGERY | Facility: CLINIC | Age: 74
End: 2019-06-19
Payer: MEDICARE

## 2019-06-19 PROCEDURE — 36415 COLL VENOUS BLD VENIPUNCTURE: CPT

## 2019-06-24 LAB
T3 SERPL-MCNC: 74 NG/DL
T4 FREE SERPL-MCNC: 1.5 NG/DL
THYROGLOB AB SERPL-ACNC: <20 IU/ML
THYROGLOB SERPL-MCNC: 0.26 NG/ML
TSH SERPL-ACNC: 8.72 UIU/ML

## 2019-09-02 PROBLEM — Z09 FOLLOW UP: Status: ACTIVE | Noted: 2019-02-26

## 2019-09-04 ENCOUNTER — OUTPATIENT (OUTPATIENT)
Dept: OUTPATIENT SERVICES | Facility: HOSPITAL | Age: 74
LOS: 1 days | End: 2019-09-04
Payer: MEDICARE

## 2019-09-04 ENCOUNTER — APPOINTMENT (OUTPATIENT)
Dept: ULTRASOUND IMAGING | Facility: IMAGING CENTER | Age: 74
End: 2019-09-04
Payer: MEDICARE

## 2019-09-04 ENCOUNTER — APPOINTMENT (OUTPATIENT)
Dept: MAMMOGRAPHY | Facility: IMAGING CENTER | Age: 74
End: 2019-09-04
Payer: MEDICARE

## 2019-09-04 DIAGNOSIS — Z86.39 PERSONAL HISTORY OF OTHER ENDOCRINE, NUTRITIONAL AND METABOLIC DISEASE: Chronic | ICD-10-CM

## 2019-09-04 DIAGNOSIS — Z90.721 ACQUIRED ABSENCE OF OVARIES, UNILATERAL: Chronic | ICD-10-CM

## 2019-09-04 DIAGNOSIS — Z00.8 ENCOUNTER FOR OTHER GENERAL EXAMINATION: ICD-10-CM

## 2019-09-04 DIAGNOSIS — Z98.890 OTHER SPECIFIED POSTPROCEDURAL STATES: Chronic | ICD-10-CM

## 2019-09-04 DIAGNOSIS — Z98.891 HISTORY OF UTERINE SCAR FROM PREVIOUS SURGERY: Chronic | ICD-10-CM

## 2019-09-04 DIAGNOSIS — Z96.659 PRESENCE OF UNSPECIFIED ARTIFICIAL KNEE JOINT: Chronic | ICD-10-CM

## 2019-09-04 DIAGNOSIS — Z98.49 CATARACT EXTRACTION STATUS, UNSPECIFIED EYE: Chronic | ICD-10-CM

## 2019-09-04 PROCEDURE — 77067 SCR MAMMO BI INCL CAD: CPT

## 2019-09-04 PROCEDURE — 76641 ULTRASOUND BREAST COMPLETE: CPT | Mod: 26,50

## 2019-09-04 PROCEDURE — 77067 SCR MAMMO BI INCL CAD: CPT | Mod: 26

## 2019-09-04 PROCEDURE — 76641 ULTRASOUND BREAST COMPLETE: CPT

## 2019-09-04 PROCEDURE — 77063 BREAST TOMOSYNTHESIS BI: CPT | Mod: 26

## 2019-09-04 PROCEDURE — 77063 BREAST TOMOSYNTHESIS BI: CPT

## 2019-09-24 ENCOUNTER — APPOINTMENT (OUTPATIENT)
Dept: ULTRASOUND IMAGING | Facility: IMAGING CENTER | Age: 74
End: 2019-09-24
Payer: MEDICARE

## 2019-09-24 ENCOUNTER — OUTPATIENT (OUTPATIENT)
Dept: OUTPATIENT SERVICES | Facility: HOSPITAL | Age: 74
LOS: 1 days | End: 2019-09-24
Payer: MEDICARE

## 2019-09-24 DIAGNOSIS — Z86.39 PERSONAL HISTORY OF OTHER ENDOCRINE, NUTRITIONAL AND METABOLIC DISEASE: Chronic | ICD-10-CM

## 2019-09-24 DIAGNOSIS — Z00.8 ENCOUNTER FOR OTHER GENERAL EXAMINATION: ICD-10-CM

## 2019-09-24 DIAGNOSIS — Z98.890 OTHER SPECIFIED POSTPROCEDURAL STATES: Chronic | ICD-10-CM

## 2019-09-24 DIAGNOSIS — Z90.721 ACQUIRED ABSENCE OF OVARIES, UNILATERAL: Chronic | ICD-10-CM

## 2019-09-24 DIAGNOSIS — Z96.659 PRESENCE OF UNSPECIFIED ARTIFICIAL KNEE JOINT: Chronic | ICD-10-CM

## 2019-09-24 DIAGNOSIS — Z98.891 HISTORY OF UTERINE SCAR FROM PREVIOUS SURGERY: Chronic | ICD-10-CM

## 2019-09-24 DIAGNOSIS — Z98.49 CATARACT EXTRACTION STATUS, UNSPECIFIED EYE: Chronic | ICD-10-CM

## 2019-09-24 PROCEDURE — 76641 ULTRASOUND BREAST COMPLETE: CPT | Mod: 26,LT

## 2019-09-24 PROCEDURE — 76641 ULTRASOUND BREAST COMPLETE: CPT

## 2019-10-09 ENCOUNTER — LABORATORY RESULT (OUTPATIENT)
Age: 74
End: 2019-10-09

## 2019-10-09 ENCOUNTER — APPOINTMENT (OUTPATIENT)
Dept: SURGERY | Facility: CLINIC | Age: 74
End: 2019-10-09
Payer: MEDICARE

## 2019-10-09 DIAGNOSIS — E04.2 NONTOXIC MULTINODULAR GOITER: ICD-10-CM

## 2019-10-09 PROCEDURE — 36415 COLL VENOUS BLD VENIPUNCTURE: CPT

## 2019-10-09 PROCEDURE — 99213 OFFICE O/P EST LOW 20 MIN: CPT

## 2019-10-09 NOTE — PHYSICAL EXAM
[de-identified] : No cervical or supraclavicular adenopathy,incision healing well [Normal] : assessment of respiratory effort is normal [de-identified] : Skin:  normal appearance.  no rash, nodules, vesicles, or erythema,\par Musculoskeletal:  full range of motion and no deformities appreciated\par Neurological:  grossly intact\par Psychiatric:  oriented to person, place and time with appropriate affect

## 2019-10-09 NOTE — HISTORY OF PRESENT ILLNESS
[de-identified] : patient denies dysphagia change in voice or recent illness. Question of enlargement of right nodule. Last ultrasound September 2016 With slight variation in size with nodules..Last biopsied April 2016 benign. Patient denies recent illness. Patient denies hospitalization  F/U US 3/17 with slight increase in one nodule.   Denies recent illness but c/o joint pain with elevated CHASITY. US 10/2017 stable denies recent illness. repeat FNA AUS with BRAF mutation.  returns to discuss surgery. multiple questions answered. patient with chronic neck pain, obtained neurological clearance  now ready to schedule\par 4/2/19 Total thyroidectomy .4.2 cm papillary thyroid cancer.   denies dysphagia, hoarseness or parathesias. treated with 128 MCI 5/2019 with minimal uptake,  reports fatigue improved, denies weight gain on 112 mcg.  reports intermittent parotid swelling.

## 2019-10-11 LAB
T3 SERPL-MCNC: 65 NG/DL
T4 FREE SERPL-MCNC: 1.7 NG/DL
THYROGLOB AB SERPL-ACNC: <20 IU/ML
THYROGLOB SERPL-MCNC: <0.2 NG/ML
TSH SERPL-ACNC: 1.64 UIU/ML

## 2019-10-15 ENCOUNTER — OUTPATIENT (OUTPATIENT)
Dept: OUTPATIENT SERVICES | Facility: HOSPITAL | Age: 74
LOS: 1 days | End: 2019-10-15
Payer: MEDICARE

## 2019-10-15 ENCOUNTER — APPOINTMENT (OUTPATIENT)
Dept: CT IMAGING | Facility: IMAGING CENTER | Age: 74
End: 2019-10-15
Payer: MEDICARE

## 2019-10-15 DIAGNOSIS — Z00.8 ENCOUNTER FOR OTHER GENERAL EXAMINATION: ICD-10-CM

## 2019-10-15 DIAGNOSIS — Z90.721 ACQUIRED ABSENCE OF OVARIES, UNILATERAL: Chronic | ICD-10-CM

## 2019-10-15 DIAGNOSIS — Z98.49 CATARACT EXTRACTION STATUS, UNSPECIFIED EYE: Chronic | ICD-10-CM

## 2019-10-15 DIAGNOSIS — Z96.659 PRESENCE OF UNSPECIFIED ARTIFICIAL KNEE JOINT: Chronic | ICD-10-CM

## 2019-10-15 DIAGNOSIS — Z98.891 HISTORY OF UTERINE SCAR FROM PREVIOUS SURGERY: Chronic | ICD-10-CM

## 2019-10-15 DIAGNOSIS — Z86.39 PERSONAL HISTORY OF OTHER ENDOCRINE, NUTRITIONAL AND METABOLIC DISEASE: Chronic | ICD-10-CM

## 2019-10-15 DIAGNOSIS — Z98.890 OTHER SPECIFIED POSTPROCEDURAL STATES: Chronic | ICD-10-CM

## 2019-10-15 PROCEDURE — 71250 CT THORAX DX C-: CPT

## 2019-10-15 PROCEDURE — 71250 CT THORAX DX C-: CPT | Mod: 26

## 2019-10-22 NOTE — ASU DISCHARGE PLAN (ADULT/PEDIATRIC) - PROCEDURE
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

## 2019-11-19 ENCOUNTER — OUTPATIENT (OUTPATIENT)
Dept: OUTPATIENT SERVICES | Facility: HOSPITAL | Age: 74
LOS: 1 days | Discharge: HOME | End: 2019-11-19

## 2019-11-19 DIAGNOSIS — Z90.721 ACQUIRED ABSENCE OF OVARIES, UNILATERAL: Chronic | ICD-10-CM

## 2019-11-19 DIAGNOSIS — Z86.39 PERSONAL HISTORY OF OTHER ENDOCRINE, NUTRITIONAL AND METABOLIC DISEASE: Chronic | ICD-10-CM

## 2019-11-19 DIAGNOSIS — Z98.890 OTHER SPECIFIED POSTPROCEDURAL STATES: Chronic | ICD-10-CM

## 2019-11-19 DIAGNOSIS — Z98.49 CATARACT EXTRACTION STATUS, UNSPECIFIED EYE: Chronic | ICD-10-CM

## 2019-11-19 DIAGNOSIS — Z98.891 HISTORY OF UTERINE SCAR FROM PREVIOUS SURGERY: Chronic | ICD-10-CM

## 2019-11-19 DIAGNOSIS — Z96.659 PRESENCE OF UNSPECIFIED ARTIFICIAL KNEE JOINT: Chronic | ICD-10-CM

## 2019-11-19 DIAGNOSIS — D72.819 DECREASED WHITE BLOOD CELL COUNT, UNSPECIFIED: ICD-10-CM

## 2019-12-03 ENCOUNTER — APPOINTMENT (OUTPATIENT)
Dept: HEMATOLOGY ONCOLOGY | Facility: CLINIC | Age: 74
End: 2019-12-03
Payer: MEDICARE

## 2019-12-03 ENCOUNTER — RESULT REVIEW (OUTPATIENT)
Age: 74
End: 2019-12-03

## 2019-12-03 VITALS
SYSTOLIC BLOOD PRESSURE: 163 MMHG | HEART RATE: 75 BPM | RESPIRATION RATE: 17 BRPM | WEIGHT: 132.28 LBS | DIASTOLIC BLOOD PRESSURE: 85 MMHG | BODY MASS INDEX: 22.58 KG/M2 | TEMPERATURE: 97.8 F | HEIGHT: 63.98 IN | OXYGEN SATURATION: 98 %

## 2019-12-03 DIAGNOSIS — Z80.3 FAMILY HISTORY OF MALIGNANT NEOPLASM OF BREAST: ICD-10-CM

## 2019-12-03 DIAGNOSIS — D72.819 DECREASED WHITE BLOOD CELL COUNT, UNSPECIFIED: ICD-10-CM

## 2019-12-03 DIAGNOSIS — Z63.4 DISAPPEARANCE AND DEATH OF FAMILY MEMBER: ICD-10-CM

## 2019-12-03 LAB
BASOPHILS # BLD AUTO: 0 K/UL — SIGNIFICANT CHANGE UP (ref 0–0.2)
BASOPHILS NFR BLD AUTO: 0.6 % — SIGNIFICANT CHANGE UP (ref 0–2)
EOSINOPHIL # BLD AUTO: 0.3 K/UL — SIGNIFICANT CHANGE UP (ref 0–0.5)
EOSINOPHIL NFR BLD AUTO: 6 % — SIGNIFICANT CHANGE UP (ref 0–6)
HCT VFR BLD CALC: 42.3 % — SIGNIFICANT CHANGE UP (ref 34.5–45)
HGB BLD-MCNC: 14.2 G/DL — SIGNIFICANT CHANGE UP (ref 11.5–15.5)
LYMPHOCYTES # BLD AUTO: 1.3 K/UL — SIGNIFICANT CHANGE UP (ref 1–3.3)
LYMPHOCYTES # BLD AUTO: 23.6 % — SIGNIFICANT CHANGE UP (ref 13–44)
MCHC RBC-ENTMCNC: 30 PG — SIGNIFICANT CHANGE UP (ref 27–34)
MCHC RBC-ENTMCNC: 33.6 G/DL — SIGNIFICANT CHANGE UP (ref 32–36)
MCV RBC AUTO: 89.4 FL — SIGNIFICANT CHANGE UP (ref 80–100)
MONOCYTES # BLD AUTO: 0.4 K/UL — SIGNIFICANT CHANGE UP (ref 0–0.9)
MONOCYTES NFR BLD AUTO: 7.1 % — SIGNIFICANT CHANGE UP (ref 2–14)
NEUTROPHILS # BLD AUTO: 3.4 K/UL — SIGNIFICANT CHANGE UP (ref 1.8–7.4)
NEUTROPHILS NFR BLD AUTO: 62.6 % — SIGNIFICANT CHANGE UP (ref 43–77)
PLATELET # BLD AUTO: 202 K/UL — SIGNIFICANT CHANGE UP (ref 150–400)
RBC # BLD: 4.74 M/UL — SIGNIFICANT CHANGE UP (ref 3.8–5.2)
RBC # FLD: 12 % — SIGNIFICANT CHANGE UP (ref 10.3–14.5)
WBC # BLD: 5.5 K/UL — SIGNIFICANT CHANGE UP (ref 3.8–10.5)
WBC # FLD AUTO: 5.5 K/UL — SIGNIFICANT CHANGE UP (ref 3.8–10.5)

## 2019-12-03 PROCEDURE — 99205 OFFICE O/P NEW HI 60 MIN: CPT

## 2019-12-03 SDOH — SOCIAL STABILITY - SOCIAL INSECURITY: DISSAPEARANCE AND DEATH OF FAMILY MEMBER: Z63.4

## 2019-12-03 NOTE — CONSULT LETTER
[Dear  ___] : Dear  [unfilled], [Please see my note below.] : Please see my note below. [Consult Letter:] : I had the pleasure of evaluating your patient, [unfilled]. [Consult Closing:] : Thank you very much for allowing me to participate in the care of this patient.  If you have any questions, please do not hesitate to contact me. [Sincerely,] : Sincerely, [FreeTextEntry2] : Deshawn Tatum M.D. [FreeTextEntry3] : [facility]ra M.D., [facility]\par  [DrEnrike  ___] : Dr. MEJIA

## 2019-12-03 NOTE — ADDENDUM
[FreeTextEntry1] : Documented by Abdoul Bhat acting as a scribe for Dr. Joseph Ghotra on 12/03/2019\par \par All medical record entries made by a scribe were at my, Dr. Joseph Ghotra direction and personally dictated by me on 12/03/2019 . I have reviewed the chart and agree that the record accurately reflects my personal performance of the history, physical exam, procedure and imaging.\par

## 2019-12-03 NOTE — ASSESSMENT
[FreeTextEntry1] : Mild leukopenia without neutropenia noted on two occasions in several blood counts taken between 2005 and 10/2019. The most recent WBC of 3.1 in 10/2019 was drawn about 6 mos after receiving 128.1 mCi of I-131 which has occasionally been associated with transient, mild myelosuppression in this time frame.\par Given her history, a B-12 level will be rechecked. In addition, viral serologies will be drawn.\par I don’t think it likely that the viral studies will be positive. There is no need at this time for any further evaluations other than periodic checks of her CBC once or twice yearly.\par  \par It should be kept in mind when assessing blood counts result that she may be receiving another dose of BEJARANO within the next few mos.

## 2019-12-03 NOTE — REVIEW OF SYSTEMS
[Negative] : Allergic/Immunologic [Joint Pain] : joint pain [Muscle Pain] : muscle pain [FreeTextEntry9] : Total knee replacement

## 2019-12-03 NOTE — HISTORY OF PRESENT ILLNESS
[de-identified] : Ms. Gonzales is a 74 year old woman, referred for an evaluation of mild leukopenia.\par \par She has a h/o thyroid papillary cancer and had a total thyroidectomy in 4/2019. After surgery, she received BEJARANO on 4/10/2019.\par She is clinically OLLIE. She also has a h/o nontoxic multiple nodular goiter, coronary artery spasm, and mild AI.\par There was an episode in 4/2018 of transient global amnesia treated with TPA, with resolution.\par \par She has had multiple CBCs going back to 2005. All of them were normal with an exception of a WBC of 3.5 in 2017 and a WBC of 3.1 in 10/2019. The ANCs were wnl as were the Hgb and Plt count.  CHASITY and DS DNA were negative. She previously had a more extensive panel of rheumatologic serologies were drawn in 1/2017 and were all negative except for a positive CHASITY 1;80 in a speckled pattern.\par \par She also describes a raised rash affecting her anterior abdominal wall, flanks and back. She states that she was evaluated by derm and a hematologist and can not recall what dx was made. She does recall that the rash was not blistering. \par She also describes an association between the rash and B-12 deficiency and received parenteral B-12 until about 3 yrs ago.\par She has had unremarkable immunoprotein studies. \par She is a identical twin of a patient that I have treated who has both a h/o breast cancer and marginal zone lymphoma.\par \par She c/o myalgias affecting the proximal UE as well as ankles. She has not had a recurrent infection.  [de-identified] : Initial outpatient visit.

## 2019-12-04 LAB
ALBUMIN SERPL ELPH-MCNC: 4.5 G/DL
ALP BLD-CCNC: 71 U/L
ALT SERPL-CCNC: 13 U/L
ANION GAP SERPL CALC-SCNC: 15 MMOL/L
AST SERPL-CCNC: 15 U/L
BILIRUB SERPL-MCNC: 0.3 MG/DL
BUN SERPL-MCNC: 25 MG/DL
CALCIUM SERPL-MCNC: 9.5 MG/DL
CHLORIDE SERPL-SCNC: 104 MMOL/L
CO2 SERPL-SCNC: 26 MMOL/L
CREAT SERPL-MCNC: 0.71 MG/DL
GLUCOSE SERPL-MCNC: 103 MG/DL
HBV CORE IGG+IGM SER QL: NONREACTIVE
HBV SURFACE AB SER QL: NONREACTIVE
HBV SURFACE AG SER QL: NONREACTIVE
HCV AB SER QL: NONREACTIVE
HCV S/CO RATIO: 0.14 S/CO
HIV1+2 AB SPEC QL IA.RAPID: NONREACTIVE
LDH SERPL-CCNC: 230 U/L
POTASSIUM SERPL-SCNC: 3.5 MMOL/L
PROT SERPL-MCNC: 6.8 G/DL
SODIUM SERPL-SCNC: 145 MMOL/L
VIT B12 SERPL-MCNC: 389 PG/ML

## 2020-02-12 ENCOUNTER — APPOINTMENT (OUTPATIENT)
Dept: OBGYN | Facility: CLINIC | Age: 75
End: 2020-02-12
Payer: MEDICARE

## 2020-02-12 ENCOUNTER — RESULT REVIEW (OUTPATIENT)
Age: 75
End: 2020-02-12

## 2020-02-12 PROCEDURE — 99213 OFFICE O/P EST LOW 20 MIN: CPT

## 2020-03-04 ENCOUNTER — APPOINTMENT (OUTPATIENT)
Dept: ULTRASOUND IMAGING | Facility: IMAGING CENTER | Age: 75
End: 2020-03-04
Payer: MEDICARE

## 2020-03-04 ENCOUNTER — OUTPATIENT (OUTPATIENT)
Dept: OUTPATIENT SERVICES | Facility: HOSPITAL | Age: 75
LOS: 1 days | End: 2020-03-04
Payer: MEDICARE

## 2020-03-04 DIAGNOSIS — Z90.721 ACQUIRED ABSENCE OF OVARIES, UNILATERAL: Chronic | ICD-10-CM

## 2020-03-04 DIAGNOSIS — Z96.659 PRESENCE OF UNSPECIFIED ARTIFICIAL KNEE JOINT: Chronic | ICD-10-CM

## 2020-03-04 DIAGNOSIS — Z98.891 HISTORY OF UTERINE SCAR FROM PREVIOUS SURGERY: Chronic | ICD-10-CM

## 2020-03-04 DIAGNOSIS — Z00.8 ENCOUNTER FOR OTHER GENERAL EXAMINATION: ICD-10-CM

## 2020-03-04 DIAGNOSIS — Z86.39 PERSONAL HISTORY OF OTHER ENDOCRINE, NUTRITIONAL AND METABOLIC DISEASE: Chronic | ICD-10-CM

## 2020-03-04 DIAGNOSIS — Z98.890 OTHER SPECIFIED POSTPROCEDURAL STATES: Chronic | ICD-10-CM

## 2020-03-04 DIAGNOSIS — Z98.49 CATARACT EXTRACTION STATUS, UNSPECIFIED EYE: Chronic | ICD-10-CM

## 2020-03-04 PROCEDURE — 76642 ULTRASOUND BREAST LIMITED: CPT | Mod: 26,LT

## 2020-03-04 PROCEDURE — 76642 ULTRASOUND BREAST LIMITED: CPT

## 2020-05-27 ENCOUNTER — APPOINTMENT (OUTPATIENT)
Dept: SURGERY | Facility: CLINIC | Age: 75
End: 2020-05-27
Payer: MEDICARE

## 2020-05-27 ENCOUNTER — LABORATORY RESULT (OUTPATIENT)
Age: 75
End: 2020-05-27

## 2020-05-27 PROCEDURE — 99213 OFFICE O/P EST LOW 20 MIN: CPT

## 2020-05-27 PROCEDURE — 36415 COLL VENOUS BLD VENIPUNCTURE: CPT

## 2020-05-27 NOTE — PHYSICAL EXAM
[de-identified] : No cervical or supraclavicular adenopathy,incision healing well [Normal] : no neck adenopathy [de-identified] : Skin:  normal appearance.  no rash, nodules, vesicles, or erythema,\par Musculoskeletal:  full range of motion and no deformities appreciated\par Neurological:  grossly intact\par Psychiatric:  oriented to person, place and time with appropriate affect

## 2020-05-27 NOTE — HISTORY OF PRESENT ILLNESS
[de-identified] : patient denies dysphagia change in voice or recent illness. Question of enlargement of right nodule. Last ultrasound September 2016 With slight variation in size with nodules..Last biopsied April 2016 benign. Patient denies recent illness. Patient denies hospitalization  F/U US 3/17 with slight increase in one nodule.   Denies recent illness but c/o joint pain with elevated CHASITY. US 10/2017 stable denies recent illness. repeat FNA AUS with BRAF mutation.  returns to discuss surgery. multiple questions answered. patient with chronic neck pain, obtained neurological clearance  now ready to schedule\par 4/2/19 Total thyroidectomy .4.2 cm papillary thyroid cancer.   denies dysphagia, hoarseness or parathesias. treated with 128 MCI 5/2019 with minimal uptake,  reports fatigue improved, reports mild weight gain on 112 mcg.  reports intermittent parotid swelling continues.

## 2020-05-27 NOTE — ASSESSMENT
[FreeTextEntry1] : pap thy cancer,guido sent. questions answered  f/u met survey , RTO  6mo  I reviewed the expected course of illness and the intent of current treatment with the patient. I have answered her questions.\par

## 2020-06-01 LAB
T3 SERPL-MCNC: 89 NG/DL
T4 FREE SERPL-MCNC: 1.5 NG/DL
THYROGLOB AB SERPL-ACNC: <20 IU/ML
THYROGLOB SERPL-MCNC: <0.2 NG/ML
TSH SERPL-ACNC: 1.6 UIU/ML

## 2020-09-14 ENCOUNTER — APPOINTMENT (OUTPATIENT)
Dept: NUCLEAR MEDICINE | Facility: HOSPITAL | Age: 75
End: 2020-09-14

## 2020-09-14 ENCOUNTER — OUTPATIENT (OUTPATIENT)
Dept: OUTPATIENT SERVICES | Facility: HOSPITAL | Age: 75
LOS: 1 days | End: 2020-09-14
Payer: MEDICARE

## 2020-09-14 DIAGNOSIS — C73 MALIGNANT NEOPLASM OF THYROID GLAND: ICD-10-CM

## 2020-09-14 DIAGNOSIS — Z90.721 ACQUIRED ABSENCE OF OVARIES, UNILATERAL: Chronic | ICD-10-CM

## 2020-09-14 DIAGNOSIS — R10.9 UNSPECIFIED ABDOMINAL PAIN: ICD-10-CM

## 2020-09-14 DIAGNOSIS — E89.0 POSTPROCEDURAL HYPOTHYROIDISM: ICD-10-CM

## 2020-09-14 DIAGNOSIS — Z86.39 PERSONAL HISTORY OF OTHER ENDOCRINE, NUTRITIONAL AND METABOLIC DISEASE: Chronic | ICD-10-CM

## 2020-09-14 DIAGNOSIS — Z98.890 OTHER SPECIFIED POSTPROCEDURAL STATES: Chronic | ICD-10-CM

## 2020-09-14 DIAGNOSIS — Z98.49 CATARACT EXTRACTION STATUS, UNSPECIFIED EYE: Chronic | ICD-10-CM

## 2020-09-14 DIAGNOSIS — Z98.891 HISTORY OF UTERINE SCAR FROM PREVIOUS SURGERY: Chronic | ICD-10-CM

## 2020-09-14 DIAGNOSIS — Z96.659 PRESENCE OF UNSPECIFIED ARTIFICIAL KNEE JOINT: Chronic | ICD-10-CM

## 2020-09-15 ENCOUNTER — APPOINTMENT (OUTPATIENT)
Dept: NUCLEAR MEDICINE | Facility: HOSPITAL | Age: 75
End: 2020-09-15

## 2020-09-16 ENCOUNTER — RESULT REVIEW (OUTPATIENT)
Age: 75
End: 2020-09-16

## 2020-09-16 ENCOUNTER — APPOINTMENT (OUTPATIENT)
Dept: NUCLEAR MEDICINE | Facility: HOSPITAL | Age: 75
End: 2020-09-16

## 2020-09-18 ENCOUNTER — RESULT REVIEW (OUTPATIENT)
Age: 75
End: 2020-09-18

## 2020-09-18 ENCOUNTER — APPOINTMENT (OUTPATIENT)
Dept: NUCLEAR MEDICINE | Facility: HOSPITAL | Age: 75
End: 2020-09-18

## 2020-09-18 PROCEDURE — 78020 THYROID MET UPTAKE: CPT

## 2020-09-18 PROCEDURE — 96372 THER/PROPH/DIAG INJ SC/IM: CPT

## 2020-09-18 PROCEDURE — A9528: CPT

## 2020-09-18 PROCEDURE — 78018 THYROID MET IMAGING BODY: CPT | Mod: 26

## 2020-09-18 PROCEDURE — 78018 THYROID MET IMAGING BODY: CPT

## 2020-09-18 PROCEDURE — 78020 THYROID MET UPTAKE: CPT | Mod: 26

## 2020-10-07 ENCOUNTER — OUTPATIENT (OUTPATIENT)
Dept: OUTPATIENT SERVICES | Facility: HOSPITAL | Age: 75
LOS: 1 days | End: 2020-10-07
Payer: MEDICARE

## 2020-10-07 ENCOUNTER — APPOINTMENT (OUTPATIENT)
Dept: MAMMOGRAPHY | Facility: IMAGING CENTER | Age: 75
End: 2020-10-07
Payer: MEDICARE

## 2020-10-07 ENCOUNTER — APPOINTMENT (OUTPATIENT)
Dept: ULTRASOUND IMAGING | Facility: IMAGING CENTER | Age: 75
End: 2020-10-07
Payer: MEDICARE

## 2020-10-07 DIAGNOSIS — Z86.39 PERSONAL HISTORY OF OTHER ENDOCRINE, NUTRITIONAL AND METABOLIC DISEASE: Chronic | ICD-10-CM

## 2020-10-07 DIAGNOSIS — Z00.8 ENCOUNTER FOR OTHER GENERAL EXAMINATION: ICD-10-CM

## 2020-10-07 DIAGNOSIS — Z98.891 HISTORY OF UTERINE SCAR FROM PREVIOUS SURGERY: Chronic | ICD-10-CM

## 2020-10-07 DIAGNOSIS — Z98.890 OTHER SPECIFIED POSTPROCEDURAL STATES: Chronic | ICD-10-CM

## 2020-10-07 DIAGNOSIS — Z90.721 ACQUIRED ABSENCE OF OVARIES, UNILATERAL: Chronic | ICD-10-CM

## 2020-10-07 DIAGNOSIS — Z96.659 PRESENCE OF UNSPECIFIED ARTIFICIAL KNEE JOINT: Chronic | ICD-10-CM

## 2020-10-07 DIAGNOSIS — Z98.49 CATARACT EXTRACTION STATUS, UNSPECIFIED EYE: Chronic | ICD-10-CM

## 2020-10-07 PROCEDURE — 77067 SCR MAMMO BI INCL CAD: CPT | Mod: 26

## 2020-10-07 PROCEDURE — 76641 ULTRASOUND BREAST COMPLETE: CPT

## 2020-10-07 PROCEDURE — 76641 ULTRASOUND BREAST COMPLETE: CPT | Mod: 26,50

## 2020-10-07 PROCEDURE — 77063 BREAST TOMOSYNTHESIS BI: CPT | Mod: 26

## 2020-10-07 PROCEDURE — 77063 BREAST TOMOSYNTHESIS BI: CPT

## 2020-10-07 PROCEDURE — 77067 SCR MAMMO BI INCL CAD: CPT

## 2020-10-15 ENCOUNTER — OUTPATIENT (OUTPATIENT)
Dept: OUTPATIENT SERVICES | Facility: HOSPITAL | Age: 75
LOS: 1 days | End: 2020-10-15
Payer: MEDICARE

## 2020-10-15 ENCOUNTER — APPOINTMENT (OUTPATIENT)
Dept: ULTRASOUND IMAGING | Facility: IMAGING CENTER | Age: 75
End: 2020-10-15
Payer: MEDICARE

## 2020-10-15 ENCOUNTER — RESULT REVIEW (OUTPATIENT)
Age: 75
End: 2020-10-15

## 2020-10-15 DIAGNOSIS — Z96.659 PRESENCE OF UNSPECIFIED ARTIFICIAL KNEE JOINT: Chronic | ICD-10-CM

## 2020-10-15 DIAGNOSIS — Z98.890 OTHER SPECIFIED POSTPROCEDURAL STATES: Chronic | ICD-10-CM

## 2020-10-15 DIAGNOSIS — Z90.721 ACQUIRED ABSENCE OF OVARIES, UNILATERAL: Chronic | ICD-10-CM

## 2020-10-15 DIAGNOSIS — R92.8 OTHER ABNORMAL AND INCONCLUSIVE FINDINGS ON DIAGNOSTIC IMAGING OF BREAST: ICD-10-CM

## 2020-10-15 DIAGNOSIS — Z86.39 PERSONAL HISTORY OF OTHER ENDOCRINE, NUTRITIONAL AND METABOLIC DISEASE: Chronic | ICD-10-CM

## 2020-10-15 DIAGNOSIS — Z98.891 HISTORY OF UTERINE SCAR FROM PREVIOUS SURGERY: Chronic | ICD-10-CM

## 2020-10-15 DIAGNOSIS — Z98.49 CATARACT EXTRACTION STATUS, UNSPECIFIED EYE: Chronic | ICD-10-CM

## 2020-10-15 PROCEDURE — 19083 BX BREAST 1ST LESION US IMAG: CPT | Mod: LT

## 2020-10-15 PROCEDURE — 77065 DX MAMMO INCL CAD UNI: CPT | Mod: 26,LT

## 2020-10-15 PROCEDURE — 88305 TISSUE EXAM BY PATHOLOGIST: CPT

## 2020-10-15 PROCEDURE — A4648: CPT

## 2020-10-15 PROCEDURE — 88305 TISSUE EXAM BY PATHOLOGIST: CPT | Mod: 26

## 2020-10-15 PROCEDURE — 19083 BX BREAST 1ST LESION US IMAG: CPT

## 2020-10-15 PROCEDURE — 77065 DX MAMMO INCL CAD UNI: CPT

## 2020-10-16 LAB — SURGICAL PATHOLOGY STUDY: SIGNIFICANT CHANGE UP

## 2020-10-22 ENCOUNTER — LABORATORY RESULT (OUTPATIENT)
Age: 75
End: 2020-10-22

## 2020-10-22 ENCOUNTER — APPOINTMENT (OUTPATIENT)
Dept: SURGERY | Facility: CLINIC | Age: 75
End: 2020-10-22
Payer: MEDICARE

## 2020-10-22 DIAGNOSIS — E89.0 POSTPROCEDURAL HYPOTHYROIDISM: ICD-10-CM

## 2020-10-22 PROCEDURE — 99213 OFFICE O/P EST LOW 20 MIN: CPT

## 2020-10-22 NOTE — HISTORY OF PRESENT ILLNESS
[de-identified] : patient denies dysphagia change in voice or recent illness. Question of enlargement of right nodule. Last ultrasound September 2016 With slight variation in size with nodules..Last biopsied April 2016 benign. Patient denies recent illness. Patient denies hospitalization  F/U US 3/17 with slight increase in one nodule.   Denies recent illness but c/o joint pain with elevated CHASITY. US 10/2017 stable denies recent illness. repeat FNA AUS with BRAF mutation.  returns to discuss surgery. multiple questions answered. patient with chronic neck pain, obtained neurological clearance  now ready to schedule\par 4/2/19 Total thyroidectomy .4.2 cm papillary thyroid cancer.   denies dysphagia, hoarseness or parathesias. treated with 128 MCI 5/2019 with minimal uptake, f/u scan 9/2020 TG negative, resolved uptake.   reports fatigue improved, reports mild weight gain on 112 mcg.  reports intermittent parotid swelling continues.  denies palpitations

## 2020-10-22 NOTE — PHYSICAL EXAM
[de-identified] : No cervical or supraclavicular adenopathy,incision healing well [Normal] : no neck adenopathy [de-identified] : Skin:  normal appearance.  no rash, nodules, vesicles, or erythema,\par Musculoskeletal:  full range of motion and no deformities appreciated\par Neurological:  grossly intact\par Psychiatric:  oriented to person, place and time with appropriate affect

## 2020-10-22 NOTE — ASSESSMENT
[FreeTextEntry1] : pap thy cancer,guido sent. questions answered no evidence of recurrent disease on PE or imaging.  , RTO  6mo  I reviewed the expected course of illness and the intent of current treatment with the patient. I have answered her questions.\par

## 2020-10-27 ENCOUNTER — NON-APPOINTMENT (OUTPATIENT)
Age: 75
End: 2020-10-27

## 2020-10-27 LAB
T3 SERPL-MCNC: 88 NG/DL
T4 FREE SERPL-MCNC: 1.9 NG/DL
THYROGLOB AB SERPL-ACNC: <20 IU/ML
THYROGLOB SERPL-MCNC: <0.2 NG/ML
TSH SERPL-ACNC: 0.78 UIU/ML

## 2020-10-28 RX ORDER — LEVOTHYROXINE SODIUM 112 UG/1
112 TABLET ORAL DAILY
Qty: 90 | Refills: 3 | Status: ACTIVE | COMMUNITY
Start: 2019-04-03 | End: 1900-01-01

## 2020-11-02 ENCOUNTER — APPOINTMENT (OUTPATIENT)
Dept: MAMMOGRAPHY | Facility: IMAGING CENTER | Age: 75
End: 2020-11-02

## 2020-11-02 ENCOUNTER — APPOINTMENT (OUTPATIENT)
Dept: ULTRASOUND IMAGING | Facility: IMAGING CENTER | Age: 75
End: 2020-11-02

## 2021-01-18 NOTE — PHYSICAL THERAPY INITIAL EVALUATION ADULT - NAME OF CLINICIAN
Impression: Myopia, bilateral: H52.13. Pt is happy with vision in current CLs, declined change in MF lenses. Plan: A glasses prescription has been discussed and generated. A contact lens prescription has also been discussed and generated. Patient to call with any concerns. JOSÉ MIGUEL Shepherd

## 2021-03-04 NOTE — H&P PST ADULT - NSCAFFEINETYPE_GEN_ALL_CORE_SD
From: Kaye Jeffrey  To: Lennie Brewer, APRN - CNP  Sent: 3/4/2021 3:39 PM EST  Subject: Prescription Question    I would like to schedule a virtual visit with you so I can get my citalopram 20 mg filled. It was filled through my old doctor Dr Stephanie Sunshine but I no longer have her. If you could do this for me I would appreciate it.
coffee

## 2021-03-23 ENCOUNTER — OUTPATIENT (OUTPATIENT)
Dept: OUTPATIENT SERVICES | Facility: HOSPITAL | Age: 76
LOS: 1 days | End: 2021-03-23
Payer: MEDICARE

## 2021-03-23 ENCOUNTER — APPOINTMENT (OUTPATIENT)
Dept: CT IMAGING | Facility: IMAGING CENTER | Age: 76
End: 2021-03-23
Payer: MEDICARE

## 2021-03-23 DIAGNOSIS — Z98.890 OTHER SPECIFIED POSTPROCEDURAL STATES: Chronic | ICD-10-CM

## 2021-03-23 DIAGNOSIS — Z90.721 ACQUIRED ABSENCE OF OVARIES, UNILATERAL: Chronic | ICD-10-CM

## 2021-03-23 DIAGNOSIS — Z86.39 PERSONAL HISTORY OF OTHER ENDOCRINE, NUTRITIONAL AND METABOLIC DISEASE: Chronic | ICD-10-CM

## 2021-03-23 DIAGNOSIS — Z98.891 HISTORY OF UTERINE SCAR FROM PREVIOUS SURGERY: Chronic | ICD-10-CM

## 2021-03-23 DIAGNOSIS — Z96.659 PRESENCE OF UNSPECIFIED ARTIFICIAL KNEE JOINT: Chronic | ICD-10-CM

## 2021-03-23 DIAGNOSIS — Z00.8 ENCOUNTER FOR OTHER GENERAL EXAMINATION: ICD-10-CM

## 2021-03-23 DIAGNOSIS — Z98.49 CATARACT EXTRACTION STATUS, UNSPECIFIED EYE: Chronic | ICD-10-CM

## 2021-03-23 PROCEDURE — 71250 CT THORAX DX C-: CPT | Mod: 26,MH

## 2021-03-23 PROCEDURE — 71250 CT THORAX DX C-: CPT

## 2021-03-30 ENCOUNTER — APPOINTMENT (OUTPATIENT)
Dept: ULTRASOUND IMAGING | Facility: IMAGING CENTER | Age: 76
End: 2021-03-30
Payer: MEDICARE

## 2021-03-30 ENCOUNTER — OUTPATIENT (OUTPATIENT)
Dept: OUTPATIENT SERVICES | Facility: HOSPITAL | Age: 76
LOS: 1 days | End: 2021-03-30
Payer: MEDICARE

## 2021-03-30 DIAGNOSIS — Z96.659 PRESENCE OF UNSPECIFIED ARTIFICIAL KNEE JOINT: Chronic | ICD-10-CM

## 2021-03-30 DIAGNOSIS — Z98.49 CATARACT EXTRACTION STATUS, UNSPECIFIED EYE: Chronic | ICD-10-CM

## 2021-03-30 DIAGNOSIS — Z86.39 PERSONAL HISTORY OF OTHER ENDOCRINE, NUTRITIONAL AND METABOLIC DISEASE: Chronic | ICD-10-CM

## 2021-03-30 DIAGNOSIS — Z98.891 HISTORY OF UTERINE SCAR FROM PREVIOUS SURGERY: Chronic | ICD-10-CM

## 2021-03-30 DIAGNOSIS — Z90.721 ACQUIRED ABSENCE OF OVARIES, UNILATERAL: Chronic | ICD-10-CM

## 2021-03-30 DIAGNOSIS — Z98.890 OTHER SPECIFIED POSTPROCEDURAL STATES: Chronic | ICD-10-CM

## 2021-03-30 DIAGNOSIS — Z00.8 ENCOUNTER FOR OTHER GENERAL EXAMINATION: ICD-10-CM

## 2021-03-30 PROCEDURE — 76700 US EXAM ABDOM COMPLETE: CPT | Mod: 26

## 2021-03-30 PROCEDURE — 76700 US EXAM ABDOM COMPLETE: CPT

## 2021-04-22 ENCOUNTER — APPOINTMENT (OUTPATIENT)
Dept: SURGERY | Facility: CLINIC | Age: 76
End: 2021-04-22

## 2021-04-22 ENCOUNTER — APPOINTMENT (OUTPATIENT)
Dept: SURGERY | Facility: CLINIC | Age: 76
End: 2021-04-22
Payer: MEDICARE

## 2021-11-05 ENCOUNTER — APPOINTMENT (OUTPATIENT)
Dept: MAMMOGRAPHY | Facility: IMAGING CENTER | Age: 76
End: 2021-11-05
Payer: MEDICARE

## 2021-11-05 ENCOUNTER — OUTPATIENT (OUTPATIENT)
Dept: OUTPATIENT SERVICES | Facility: HOSPITAL | Age: 76
LOS: 1 days | End: 2021-11-05
Payer: MEDICARE

## 2021-11-05 ENCOUNTER — APPOINTMENT (OUTPATIENT)
Dept: ULTRASOUND IMAGING | Facility: IMAGING CENTER | Age: 76
End: 2021-11-05
Payer: MEDICARE

## 2021-11-05 DIAGNOSIS — Z86.39 PERSONAL HISTORY OF OTHER ENDOCRINE, NUTRITIONAL AND METABOLIC DISEASE: Chronic | ICD-10-CM

## 2021-11-05 DIAGNOSIS — Z96.659 PRESENCE OF UNSPECIFIED ARTIFICIAL KNEE JOINT: Chronic | ICD-10-CM

## 2021-11-05 DIAGNOSIS — Z98.890 OTHER SPECIFIED POSTPROCEDURAL STATES: Chronic | ICD-10-CM

## 2021-11-05 DIAGNOSIS — Z90.721 ACQUIRED ABSENCE OF OVARIES, UNILATERAL: Chronic | ICD-10-CM

## 2021-11-05 DIAGNOSIS — Z98.49 CATARACT EXTRACTION STATUS, UNSPECIFIED EYE: Chronic | ICD-10-CM

## 2021-11-05 DIAGNOSIS — Z00.8 ENCOUNTER FOR OTHER GENERAL EXAMINATION: ICD-10-CM

## 2021-11-05 DIAGNOSIS — Z98.891 HISTORY OF UTERINE SCAR FROM PREVIOUS SURGERY: Chronic | ICD-10-CM

## 2021-11-05 PROCEDURE — 77067 SCR MAMMO BI INCL CAD: CPT

## 2021-11-05 PROCEDURE — 76641 ULTRASOUND BREAST COMPLETE: CPT

## 2021-11-05 PROCEDURE — 76641 ULTRASOUND BREAST COMPLETE: CPT | Mod: 26,50

## 2021-11-05 PROCEDURE — 77067 SCR MAMMO BI INCL CAD: CPT | Mod: 26

## 2021-11-05 PROCEDURE — 77063 BREAST TOMOSYNTHESIS BI: CPT | Mod: 26

## 2021-11-05 PROCEDURE — 77063 BREAST TOMOSYNTHESIS BI: CPT

## 2021-11-11 ENCOUNTER — APPOINTMENT (OUTPATIENT)
Dept: MAMMOGRAPHY | Facility: IMAGING CENTER | Age: 76
End: 2021-11-11

## 2021-11-11 ENCOUNTER — APPOINTMENT (OUTPATIENT)
Dept: ULTRASOUND IMAGING | Facility: IMAGING CENTER | Age: 76
End: 2021-11-11

## 2022-01-21 NOTE — H&P PST ADULT - ABILITY TO HEAR (WITH HEARING AID OR HEARING APPLIANCE IF NORMALLY USED):
Pt is on Airvo at 60lpm 100% plus NRB at 15lpm at 95%: pt is proned: pt tolerating well   Adequate: hears normal conversation without difficulty

## 2022-05-03 ENCOUNTER — APPOINTMENT (OUTPATIENT)
Dept: GASTROENTEROLOGY | Facility: CLINIC | Age: 77
End: 2022-05-03
Payer: MEDICARE

## 2022-05-03 VITALS
HEART RATE: 67 BPM | WEIGHT: 125 LBS | RESPIRATION RATE: 17 BRPM | OXYGEN SATURATION: 96 % | HEIGHT: 63.98 IN | TEMPERATURE: 98.7 F | BODY MASS INDEX: 21.34 KG/M2

## 2022-05-03 DIAGNOSIS — C73 MALIGNANT NEOPLASM OF THYROID GLAND: ICD-10-CM

## 2022-05-03 DIAGNOSIS — Z01.818 ENCOUNTER FOR OTHER PREPROCEDURAL EXAMINATION: ICD-10-CM

## 2022-05-03 DIAGNOSIS — R13.10 DYSPHAGIA, UNSPECIFIED: ICD-10-CM

## 2022-05-03 PROCEDURE — 99204 OFFICE O/P NEW MOD 45 MIN: CPT

## 2022-05-03 RX ORDER — OMEPRAZOLE 40 MG/1
40 CAPSULE, DELAYED RELEASE ORAL
Qty: 30 | Refills: 3 | Status: ACTIVE | COMMUNITY
Start: 2022-05-03 | End: 1900-01-01

## 2022-05-03 RX ORDER — SODIUM PICOSULFATE, MAGNESIUM OXIDE, AND ANHYDROUS CITRIC ACID 10; 3.5; 12 MG/160ML; G/160ML; G/160ML
10-3.5-12 MG-GM LIQUID ORAL
Qty: 1 | Refills: 0 | Status: ACTIVE | COMMUNITY
Start: 2022-05-03 | End: 1900-01-01

## 2022-05-03 RX ORDER — AMLODIPINE BESYLATE 5 MG/1
TABLET ORAL
Refills: 0 | Status: ACTIVE | COMMUNITY

## 2022-05-03 NOTE — HISTORY OF PRESENT ILLNESS
[FreeTextEntry1] : The patient is a 76-year-old female with a history of colon polyps. She had her last colonoscopy in 5/2019 which revealed several tubular adenomas in the cecum which were removed and a flat polyp in the sigmoid that was tattooed. This appeared to be hyperplastic but pathology revealed this to be a tubular adenoma. Her bowel movements are regular. She denies any abdominal pain. She does see occasional bright red blood on the paper which is attributable to hemorrhoids.\par For surveillance colonoscopy.\par Patient also complains of some dyspepsia, substernal discomfort, and intermittent dysphagia to solids greater than liquids.\par \par

## 2022-05-03 NOTE — ASSESSMENT
[FreeTextEntry1] : Patient with history of colon polyps will be scheduled for a colonoscopy.\par She also has dyspepsia and dysphagia.  She will be started on a proton pump inhibitor and an upper endoscopy will be scheduled as well.

## 2022-05-06 NOTE — HISTORY OF PRESENT ILLNESS
[FreeTextEntry1] : The patient is a 72-year-old female with a history of colon polyps. She had her last colonoscopy in 4/2016 which revealed several tubular adenomas in the cecum which were removed and a flat polyp in the sigmoid that was tattooed. This appeared to be hyperplastic but pathology revealed this to be a tubular adenoma. Her bowel movements are regular. She denies any abdominal pain. She does see occasional bright red blood on the paper which is attributable to hemorrhoids.\par For surveillance colonoscopy.\par \par 
No

## 2022-06-13 ENCOUNTER — APPOINTMENT (OUTPATIENT)
Dept: GASTROENTEROLOGY | Facility: CLINIC | Age: 77
End: 2022-06-13

## 2022-06-15 ENCOUNTER — EMERGENCY (EMERGENCY)
Facility: HOSPITAL | Age: 77
LOS: 1 days | Discharge: HOME | End: 2022-06-15
Admitting: EMERGENCY MEDICINE
Payer: MEDICARE

## 2022-06-15 VITALS
RESPIRATION RATE: 18 BRPM | DIASTOLIC BLOOD PRESSURE: 83 MMHG | TEMPERATURE: 98 F | OXYGEN SATURATION: 100 % | SYSTOLIC BLOOD PRESSURE: 156 MMHG | HEIGHT: 64 IN | HEART RATE: 63 BPM

## 2022-06-15 DIAGNOSIS — Z98.891 HISTORY OF UTERINE SCAR FROM PREVIOUS SURGERY: Chronic | ICD-10-CM

## 2022-06-15 DIAGNOSIS — Z98.49 CATARACT EXTRACTION STATUS, UNSPECIFIED EYE: Chronic | ICD-10-CM

## 2022-06-15 DIAGNOSIS — Z98.890 OTHER SPECIFIED POSTPROCEDURAL STATES: Chronic | ICD-10-CM

## 2022-06-15 DIAGNOSIS — Z96.659 PRESENCE OF UNSPECIFIED ARTIFICIAL KNEE JOINT: Chronic | ICD-10-CM

## 2022-06-15 DIAGNOSIS — Z86.39 PERSONAL HISTORY OF OTHER ENDOCRINE, NUTRITIONAL AND METABOLIC DISEASE: Chronic | ICD-10-CM

## 2022-06-15 DIAGNOSIS — Z90.721 ACQUIRED ABSENCE OF OVARIES, UNILATERAL: Chronic | ICD-10-CM

## 2022-06-15 PROCEDURE — 12011 RPR F/E/E/N/L/M 2.5 CM/<: CPT

## 2022-06-15 PROCEDURE — 99283 EMERGENCY DEPT VISIT LOW MDM: CPT | Mod: 25

## 2022-06-15 RX ORDER — TETANUS TOXOID, REDUCED DIPHTHERIA TOXOID AND ACELLULAR PERTUSSIS VACCINE, ADSORBED 5; 2.5; 8; 8; 2.5 [IU]/.5ML; [IU]/.5ML; UG/.5ML; UG/.5ML; UG/.5ML
0.5 SUSPENSION INTRAMUSCULAR ONCE
Refills: 0 | Status: COMPLETED | OUTPATIENT
Start: 2022-06-15 | End: 2022-06-15

## 2022-06-15 RX ADMIN — TETANUS TOXOID, REDUCED DIPHTHERIA TOXOID AND ACELLULAR PERTUSSIS VACCINE, ADSORBED 0.5 MILLILITER(S): 5; 2.5; 8; 8; 2.5 SUSPENSION INTRAMUSCULAR at 05:00

## 2022-06-15 NOTE — ED PROVIDER NOTE - OBJECTIVE STATEMENT
patient is a 77 y/o F presenting with laceration to forehead s/p being cut by glass of a mirror shortly prior to arrival. She denies sustaining any other injuries, no LOC, HA, dizziness, n/v, visual disturbance. tetanus status is out of date.

## 2022-06-15 NOTE — ED PROVIDER NOTE - PATIENT PORTAL LINK FT
You can access the FollowMyHealth Patient Portal offered by [facility] by registering at the following website: [email or web address]. By joining [employer]’s FollowMyHealth portal, you will also be able to view your health information using other applications (apps) compatible with our system.

## 2022-06-15 NOTE — ED ADULT TRIAGE NOTE - CHIEF COMPLAINT QUOTE
Pt c/o laceration to forehead s/p glass mirror hitting her in head tonight. Approx 1.5cm laceration to forehead, minimal bleeding. Denies headache, dizziness, LOC or AC use.

## 2022-06-15 NOTE — ED PROVIDER NOTE - CROS ED GI ALL NEG
What Type Of Note Output Would You Prefer (Optional)?: Standard Output Has Your Skin Lesion Been Treated?: not been treated Is This A New Presentation, Or A Follow-Up?: Skin Lesion negative...

## 2022-06-15 NOTE — ED PROVIDER NOTE - CLINICAL SUMMARY MEDICAL DECISION MAKING FREE TEXT BOX
75 y/o F presenting with laceration to forehead s/p being cut by glass of a mirror shortly prior to arrival. No LOC. patient well appearing, plan for lac repair and tetanus shot

## 2022-06-15 NOTE — ED PROVIDER NOTE - DOMESTIC TRAVEL HIGH RISK QUESTION
Called patient to let her know that I did the PA for her Emg[employer] and am waiting for an answer back from that. Also relayed to her that if we can't get it approved in time, we can get a sample loading dose for her until it gets approved. She stated understanding of message.   No

## 2022-06-15 NOTE — ED PROVIDER NOTE - NSICDXPASTSURGICALHX_GEN_ALL_CORE_FT
PAST SURGICAL HISTORY:  H/O thyroid nodule s/p excision     S/P      S/P cataract extraction b/l    S/P colonoscopy with polypectomy     S/P hernia repair years ago; b/l inguinal    S/P knee replacement left     S/P oophorectomy right, in her 20s    S/P unilateral salpingo-oophorectomy left

## 2022-06-15 NOTE — ED PROVIDER NOTE - NSFOLLOWUPINSTRUCTIONS_ED_ALL_ED_FT
Please follow up in 5-7 days for suture removal     Laceration    A laceration is a cut that goes through all of the layers of the skin and into the tissue that is right under the skin. Some lacerations heal on their own. Others need to be closed with skin adhesive strips, skin glue, stitches (sutures), or staples. Proper laceration care minimizes the risk of infection and helps the laceration to heal better.  If non-absorbable stitches or staples have been placed, they must be taken out within the time frame instructed by your healthcare provider.    SEEK IMMEDIATE MEDICAL CARE IF YOU HAVE ANY OF THE FOLLOWING SYMPTOMS: swelling around the wound, worsening pain, drainage from the wound, red streaking going away from your wound, inability to move finger or toe near the laceration, or discoloration of skin near the laceration.

## 2022-06-17 ENCOUNTER — APPOINTMENT (OUTPATIENT)
Dept: GASTROENTEROLOGY | Facility: HOSPITAL | Age: 77
End: 2022-06-17

## 2022-06-21 ENCOUNTER — EMERGENCY (EMERGENCY)
Facility: HOSPITAL | Age: 77
LOS: 1 days | Discharge: HOME | End: 2022-06-21
Admitting: EMERGENCY MEDICINE

## 2022-06-21 VITALS
TEMPERATURE: 98 F | OXYGEN SATURATION: 100 % | SYSTOLIC BLOOD PRESSURE: 141 MMHG | RESPIRATION RATE: 18 BRPM | DIASTOLIC BLOOD PRESSURE: 80 MMHG | HEIGHT: 64 IN | HEART RATE: 65 BPM

## 2022-06-21 DIAGNOSIS — Z90.721 ACQUIRED ABSENCE OF OVARIES, UNILATERAL: Chronic | ICD-10-CM

## 2022-06-21 DIAGNOSIS — Z98.891 HISTORY OF UTERINE SCAR FROM PREVIOUS SURGERY: Chronic | ICD-10-CM

## 2022-06-21 DIAGNOSIS — Z98.49 CATARACT EXTRACTION STATUS, UNSPECIFIED EYE: Chronic | ICD-10-CM

## 2022-06-21 DIAGNOSIS — Z96.659 PRESENCE OF UNSPECIFIED ARTIFICIAL KNEE JOINT: Chronic | ICD-10-CM

## 2022-06-21 DIAGNOSIS — Z86.39 PERSONAL HISTORY OF OTHER ENDOCRINE, NUTRITIONAL AND METABOLIC DISEASE: Chronic | ICD-10-CM

## 2022-06-21 DIAGNOSIS — Z98.890 OTHER SPECIFIED POSTPROCEDURAL STATES: Chronic | ICD-10-CM

## 2022-06-21 LAB — SARS-COV-2 RNA SPEC QL NAA+PROBE: DETECTED

## 2022-06-21 PROCEDURE — 99283 EMERGENCY DEPT VISIT LOW MDM: CPT | Mod: CS

## 2022-06-21 NOTE — ED PROVIDER NOTE - PATIENT PORTAL LINK FT
You can access the FollowMyHealth Patient Portal offered by [facility] by registering at the following website: [email or web address]. By joining [employer]’s FollowMyHealth portal, you will also be able to view your health information using other applications (apps) compatible with our system.

## 2022-06-21 NOTE — ED PROVIDER NOTE - OBJECTIVE STATEMENT
77 yo F here for suture removal and covid PCR. Patient explains she sustained laceration to forehead after medicine cabinet door, hit forehead one week ago. Patient feels well offers no complaints. Denies headache, blurry vision, fever, redness. Denies viral symptoms (was dx with covid in may and patient and family requesting COVID PCR).

## 2022-06-21 NOTE — ED PROVIDER NOTE - NSFOLLOWUPINSTRUCTIONS_ED_ALL_ED_FT
Follow up with your PMD within 48-72hours.       Suture Removal, Care After      This sheet gives you information about how to care for yourself after your procedure. Your health care provider may also give you more specific instructions. If you have problems or questions, contact your health care provider.      What can I expect after the procedure?    After your stitches (sutures) are removed, it is common to have:  •Some discomfort and swelling in the area.      •Slight redness in the area.        Follow these instructions at home:    If you have a bandage:     •Wash your hands with soap and water before you change your bandage (dressing). If soap and water are not available, use hand .      •Change your dressing as told by your health care provider. If your dressing becomes wet or dirty, or develops a bad smell, change it as soon as possible.      •If your dressing sticks to your skin, soak it in warm water to loosen it.        Wound care    •Check your wound every day for signs of infection. Check for:  •More redness, swelling, or pain.      •Fluid or blood.      •Warmth.      • Pus or a bad smell.        •Wash your hands with soap and water before and after touching your wound.      •Apply cream or ointment only as directed by your health care provider. If you are using cream or ointment, wash the area with soap and water 2 times a day to remove all the cream or ointment. Rinse off the soap and pat the area dry with a clean towel.      •If you have skin glue or adhesive strips on your wound, leave these closures in place. They may need to stay in place for 2 weeks or longer. If adhesive strip edges start to loosen and curl up, you may trim the loose edges. Do not remove adhesive strips completely unless your health care provider tells you to do that.      •Keep the wound area dry and clean. Do not take baths, swim, or use a hot tub until your health care provider approves.      •Continue to protect the wound from injury.      • Do not pick at your wound. Picking can cause an infection.      •When your wound has completely healed, wear sunscreen over it or cover it with clothing when you are outside. New scars get sunburned easily, which can make scarring worse.      General instructions     •Take over-the-counter and prescription medicines only as told by your health care provider.      •Keep all follow-up visits as told by your health care provider. This is important.        Contact a health care provider if:    •You have redness, swelling, or pain around your wound.      •You have fluid or blood coming from your wound.      •Your wound feels warm to the touch.      •You have pus or a bad smell coming from your wound.      •Your wound opens up.        Get help right away if:    •You have a fever.      •You have redness that is spreading from your wound.        Summary    •After your sutures are removed, it is common to have some discomfort and swelling in the area.      •Wash your hands with soap and water before you change your bandage (dressing).      •Keep the wound area dry and clean. Do not take baths, swim, or use a hot tub until your health care provider approves.      This information is not intended to replace advice given to you by your health care provider. Make sure you discuss any questions you have with your health care provider.

## 2022-06-21 NOTE — ED PROVIDER NOTE - CARE PLAN
1 Principal Discharge DX:	Visit for suture removal  Secondary Diagnosis:	Encounter for laboratory testing for COVID-19 virus

## 2022-06-21 NOTE — ED PROVIDER NOTE - ENMT, MLM
Awake Airway patent, Nasal mucosa clear. Mouth with normal mucosa. Throat has no vesicles, no oropharyngeal exudates and uvula is midline.

## 2022-06-21 NOTE — ED PROVIDER NOTE - PHYSICAL EXAMINATION
Well appearing, well nourished, awake, alert, oriented to person, place, time/situation and in no apparent distress.    Airway patent    Eyes without scleral injection. No jaundice.      Respirations unlabored.      Alert and oriented, no gross motor or sensory deficits.    Skin normal color for race, warm, dry and intact. + well healed laceration with no erythema.

## 2022-07-06 ENCOUNTER — NON-APPOINTMENT (OUTPATIENT)
Age: 77
End: 2022-07-06

## 2022-08-03 ENCOUNTER — NON-APPOINTMENT (OUTPATIENT)
Age: 77
End: 2022-08-03

## 2022-08-08 ENCOUNTER — RESULT REVIEW (OUTPATIENT)
Age: 77
End: 2022-08-08

## 2022-10-16 ENCOUNTER — LABORATORY RESULT (OUTPATIENT)
Age: 77
End: 2022-10-16

## 2022-10-17 ENCOUNTER — APPOINTMENT (OUTPATIENT)
Dept: GASTROENTEROLOGY | Facility: CLINIC | Age: 77
End: 2022-10-17

## 2022-10-18 NOTE — ASU PATIENT PROFILE, ADULT - FALL HARM RISK - UNIVERSAL INTERVENTIONS
Bed in lowest position, wheels locked, appropriate side rails in place/Call bell, personal items and telephone in reach/Instruct patient to call for assistance before getting out of bed or chair/Non-slip footwear when patient is out of bed/[place]iram to call system/Physically safe environment - no spills, clutter or unnecessary equipment/Purposeful Proactive Rounding/Room/bathroom lighting operational, light cord in reach

## 2022-10-19 ENCOUNTER — OUTPATIENT (OUTPATIENT)
Dept: OUTPATIENT SERVICES | Facility: HOSPITAL | Age: 77
LOS: 1 days | Discharge: HOME | End: 2022-10-19

## 2022-10-19 ENCOUNTER — APPOINTMENT (OUTPATIENT)
Dept: GASTROENTEROLOGY | Facility: HOSPITAL | Age: 77
End: 2022-10-19

## 2022-10-19 ENCOUNTER — RESULT REVIEW (OUTPATIENT)
Age: 77
End: 2022-10-19

## 2022-10-19 VITALS
WEIGHT: 121.92 LBS | RESPIRATION RATE: 20 BRPM | DIASTOLIC BLOOD PRESSURE: 72 MMHG | TEMPERATURE: 98 F | SYSTOLIC BLOOD PRESSURE: 127 MMHG | HEIGHT: 64 IN | HEART RATE: 60 BPM | OXYGEN SATURATION: 95 %

## 2022-10-19 VITALS
DIASTOLIC BLOOD PRESSURE: 67 MMHG | HEART RATE: 51 BPM | RESPIRATION RATE: 22 BRPM | SYSTOLIC BLOOD PRESSURE: 99 MMHG | OXYGEN SATURATION: 97 %

## 2022-10-19 DIAGNOSIS — Z86.39 PERSONAL HISTORY OF OTHER ENDOCRINE, NUTRITIONAL AND METABOLIC DISEASE: Chronic | ICD-10-CM

## 2022-10-19 DIAGNOSIS — Z98.891 HISTORY OF UTERINE SCAR FROM PREVIOUS SURGERY: Chronic | ICD-10-CM

## 2022-10-19 DIAGNOSIS — Z98.890 OTHER SPECIFIED POSTPROCEDURAL STATES: Chronic | ICD-10-CM

## 2022-10-19 DIAGNOSIS — Z90.721 ACQUIRED ABSENCE OF OVARIES, UNILATERAL: Chronic | ICD-10-CM

## 2022-10-19 DIAGNOSIS — K21.9 GASTRO-ESOPHAGEAL REFLUX DISEASE WITHOUT ESOPHAGITIS: ICD-10-CM

## 2022-10-19 DIAGNOSIS — Z96.659 PRESENCE OF UNSPECIFIED ARTIFICIAL KNEE JOINT: Chronic | ICD-10-CM

## 2022-10-19 DIAGNOSIS — Z98.49 CATARACT EXTRACTION STATUS, UNSPECIFIED EYE: Chronic | ICD-10-CM

## 2022-10-19 PROCEDURE — 45380 COLONOSCOPY AND BIOPSY: CPT | Mod: 59

## 2022-10-19 PROCEDURE — 88305 TISSUE EXAM BY PATHOLOGIST: CPT | Mod: 26

## 2022-10-19 PROCEDURE — 43239 EGD BIOPSY SINGLE/MULTIPLE: CPT | Mod: 59

## 2022-10-21 LAB — SURGICAL PATHOLOGY STUDY: SIGNIFICANT CHANGE UP

## 2022-11-07 ENCOUNTER — APPOINTMENT (OUTPATIENT)
Dept: GASTROENTEROLOGY | Facility: CLINIC | Age: 77
End: 2022-11-07

## 2022-11-07 DIAGNOSIS — R10.13 EPIGASTRIC PAIN: ICD-10-CM

## 2022-11-07 DIAGNOSIS — D12.6 BENIGN NEOPLASM OF COLON, UNSPECIFIED: ICD-10-CM

## 2022-11-07 DIAGNOSIS — Z86.010 PERSONAL HISTORY OF COLONIC POLYPS: ICD-10-CM

## 2022-11-07 PROCEDURE — 99442: CPT | Mod: 95

## 2022-11-07 RX ORDER — ACETAMINOPHEN AND CODEINE PHOSPHATE 300; 30 MG/1; MG/1
300-30 TABLET ORAL
Qty: 8 | Refills: 0 | Status: ACTIVE | COMMUNITY
Start: 2022-08-03

## 2022-11-07 RX ORDER — ERYTHROMYCIN 5 MG/G
5 OINTMENT OPHTHALMIC
Qty: 4 | Refills: 0 | Status: ACTIVE | COMMUNITY
Start: 2022-07-21

## 2022-11-07 RX ORDER — NEOMYCIN AND POLYMYXIN B SULFATES AND DEXAMETHASONE 3.5; 10000; 1 MG/G; [IU]/G; MG/G
3.5-10000-0.1 OINTMENT OPHTHALMIC
Qty: 4 | Refills: 0 | Status: ACTIVE | COMMUNITY
Start: 2022-08-03

## 2022-11-07 RX ORDER — NIRMATRELVIR AND RITONAVIR 300-100 MG
20 X 150 MG & KIT ORAL
Qty: 30 | Refills: 0 | Status: ACTIVE | COMMUNITY
Start: 2022-05-30

## 2022-11-07 RX ORDER — DOXYCYCLINE HYCLATE 50 MG/1
50 CAPSULE ORAL
Qty: 20 | Refills: 0 | Status: ACTIVE | COMMUNITY
Start: 2022-08-03

## 2022-11-07 RX ORDER — LOTEPREDNOL ETABONATE 3.8 MG/G
0.38 GEL OPHTHALMIC
Qty: 5 | Refills: 0 | Status: ACTIVE | COMMUNITY
Start: 2022-08-03

## 2022-11-07 NOTE — ASSESSMENT
[FreeTextEntry1] : Patient is status post colonoscopy which revealed several tubular adenomas.  The area of tattoo did not reveal any raised polyp.\par Patient had an upper endoscopy that revealed a hiatus hernia and a patulous LES.  She will continue her current regimen.\par \par Time spent in counseling 15 minutes

## 2022-11-07 NOTE — HISTORY OF PRESENT ILLNESS
[Home] : at home, [unfilled] , at the time of the visit. [Medical Office: ([facility])___] : at the medical office located in  [Verbal consent obtained from patient] : the patient, [unfilled] [FreeTextEntry1] : The patient is a 77-year-old female with a history of colon polyps. She had her last colonoscopy in 5/2019 which revealed several tubular adenomas in the cecum which were removed and a flat polyp in the sigmoid that was tattooed. This appeared to be hyperplastic but pathology revealed this to be a tubular adenoma. Her bowel movements are regular. She denies any abdominal pain. She does see occasional bright red blood on the paper which is attributable to hemorrhoids.\[facility] For surveillance colonoscopy.\par Patient also complains of some dyspepsia, substernal discomfort, and intermittent dysphagia to solids greater than liquids.\par \[facility] Patient had a colonoscopy on 10/19/2022.  This revealed a 3 mm polyp that was found in the cecum, an 8 mm polyp at the splenic flexure.  These polyps were tubular adenomas.  There was no evidence of a polyp at the tattoo site but random biopsy was consistent with tubular adenoma.  She did have a lipoma in the ascending colon.\par \[facility] Patient also had an upper endoscopy 10/19/2022.  This revealed a hiatus hernia and a patulous LES.  Biopsies revealed mild gastritis but H. pylori was negative.  Esophageal biopsies were negative.\par \[facility]

## 2022-11-10 ENCOUNTER — OUTPATIENT (OUTPATIENT)
Dept: OUTPATIENT SERVICES | Facility: HOSPITAL | Age: 77
LOS: 1 days | End: 2022-11-10
Payer: MEDICARE

## 2022-11-10 ENCOUNTER — APPOINTMENT (OUTPATIENT)
Dept: ULTRASOUND IMAGING | Facility: IMAGING CENTER | Age: 77
End: 2022-11-10

## 2022-11-10 ENCOUNTER — APPOINTMENT (OUTPATIENT)
Dept: MAMMOGRAPHY | Facility: IMAGING CENTER | Age: 77
End: 2022-11-10

## 2022-11-10 DIAGNOSIS — Z98.890 OTHER SPECIFIED POSTPROCEDURAL STATES: Chronic | ICD-10-CM

## 2022-11-10 DIAGNOSIS — Z98.891 HISTORY OF UTERINE SCAR FROM PREVIOUS SURGERY: Chronic | ICD-10-CM

## 2022-11-10 DIAGNOSIS — Z90.721 ACQUIRED ABSENCE OF OVARIES, UNILATERAL: Chronic | ICD-10-CM

## 2022-11-10 DIAGNOSIS — Z86.39 PERSONAL HISTORY OF OTHER ENDOCRINE, NUTRITIONAL AND METABOLIC DISEASE: Chronic | ICD-10-CM

## 2022-11-10 DIAGNOSIS — Z98.49 CATARACT EXTRACTION STATUS, UNSPECIFIED EYE: Chronic | ICD-10-CM

## 2022-11-10 DIAGNOSIS — Z96.659 PRESENCE OF UNSPECIFIED ARTIFICIAL KNEE JOINT: Chronic | ICD-10-CM

## 2022-11-10 DIAGNOSIS — Z00.8 ENCOUNTER FOR OTHER GENERAL EXAMINATION: ICD-10-CM

## 2022-11-10 PROCEDURE — 77063 BREAST TOMOSYNTHESIS BI: CPT | Mod: 26

## 2022-11-10 PROCEDURE — 76641 ULTRASOUND BREAST COMPLETE: CPT | Mod: 26,50

## 2022-11-10 PROCEDURE — 77067 SCR MAMMO BI INCL CAD: CPT

## 2022-11-10 PROCEDURE — 76641 ULTRASOUND BREAST COMPLETE: CPT

## 2022-11-10 PROCEDURE — 77063 BREAST TOMOSYNTHESIS BI: CPT

## 2022-11-10 PROCEDURE — 77067 SCR MAMMO BI INCL CAD: CPT | Mod: 26

## 2023-03-23 NOTE — DISCHARGE NOTE ADULT - CLICK TO LAUNCH ORM
This medical record contains text that has been entered with the assistance of computer voice recognition and dictation software.  Therefore, it may contain unintended errors in text, spelling, punctuation, or grammar        Chief Complaint   Patient presents with    Follow-Up     Lab results and blood pressure       Praveena Landers is a 66 y.o. female here evaluation and management of:         Current Outpatient Medications   Medication Sig Dispense Refill    losartan (COZAAR) 100 MG Tab TAKE 1 TABLET BY MOUTH EVERY  Tablet 0    hydroCHLOROthiazide (HYDRODIURIL) 12.5 MG tablet Take 12.5 mg by mouth every day.      valacyclovir (VALTREX) 1 GM Tab TAKE 1 TABLET BY MOUTH EVERY DAY 90 Tablet 3    Probiotic Product (PROBIOTIC DAILY PO) Take  by mouth.      Multiple Vitamins-Minerals (MULTIPLE VITAMINS/WOMENS PO) Take  by mouth.      FOLIC ACID PO Take 1 Each by mouth every day at 6 PM.      BIOTIN PO Take  by mouth.      Cholecalciferol (VITAMIN D) 2000 UNITS CAPS Take 1 Cap by mouth.      estradiol (ESTRACE) 1 MG TABS Take 1 mg by mouth every day. Takes 1/2 tab every other day      acyclovir (ZOVIRAX) 200 MG Cap Take 2 Capsules by mouth 3 times a day as needed. (Patient not taking: Reported on 3/23/2023) 60 capsule 0     No current facility-administered medications for this visit.     Patient Active Problem List    Diagnosis Date Noted    Right hip pain 08/09/2022    Viral URI 05/26/2022    Sore throat 05/26/2022    Right ear pain 04/21/2022    Medicare annual wellness visit, subsequent 07/14/2021    Hip pain, acute, right 07/07/2020    BRBPR (bright red blood per rectum) 03/31/2020    Other constipation 03/31/2020    Intercostal neuritis 03/11/2020    Arthralgia of hip 03/11/2020    Diverticulosis 03/11/2020    LLQ abdominal pain 02/05/2020    Bloody stool 02/05/2020    Left-sided chest wall pain 02/05/2020    Skin plaque 09/23/2019    Herpes simplex infection of skin 09/23/2019    Hormone replacement  "therapy (HRT) 09/23/2019    Anxiety 04/27/2017    Screening for breast cancer 10/25/2016    Herpes simplex 10/03/2014    Hypertension 07/09/2012     Past Surgical History:   Procedure Laterality Date    ABDOMINAL HYSTERECTOMY TOTAL  01/01/2001    partial, precancer cervix    BREAST BIOPSY Right 1975    excisional [id number]    WRIST EXPLORATION      Right    WRIST EXPLORATION Right     cyst removal      Social History     Tobacco Use    Smoking status: Never    Smokeless tobacco: Never   Vaping Use    Vaping Use: Never used   Substance Use Topics    Alcohol use: Yes     Comment: 1gl wine daily    Drug use: No     Family History   Problem Relation Age of Onset    Cancer Mother         breast    Cancer Father         lung    Hypertension Father     Diabetes Paternal Uncle     Cancer Sister 62        colon cancer    Heart Disease Neg Hx     Stroke Neg Hx            ROS    all review of system completed and negative except for those listed above     Objective:     /76 (BP Location: Right arm, Patient Position: Sitting, BP Cuff Size: Adult long)   Pulse 65   Temp 36.4 °C (97.6 °F) (Temporal)   Resp 16   Ht 1.575 m (5' 2\")   Wt 50 kg (110 lb 3.7 oz)   SpO2 96%  Body mass index is 20.16 kg/m².  Physical Exam:        GEN: comfortable, alert and oriented, well nourished, well developed, in no apparent distress   HEENT: NCAT, eyes: pupils equal and reactive, sclera white, EOMIT, good dentition  HEART: limbs warm and well perfused, regular rate, no JVD, no lower extremity edema  LUNGS: speaking in full sentences, not in apparent respiratory distress, no audible wheezes  MSK: normal tone and bulk, no swelling of the joints, gait steady and normal           Assessment and Plan:   The following treatment plan was discussed        Problem List Items Addressed This Visit       Hypertension     Improved  Labs reviewed in detail   Continue losartan   Continue home blood pressure log     30+ min spent     Continue q6 mo visits " and labs              Relevant Orders    Basic Metabolic Panel    MICROALBUMIN CREAT RATIO URINE    Lipid Profile     Other Visit Diagnoses       Screening for ischemic heart disease        Relevant Orders    Basic Metabolic Panel    MICROALBUMIN CREAT RATIO URINE    Lipid Profile    Screening for diabetes mellitus (DM)        Relevant Orders    Basic Metabolic Panel    MICROALBUMIN CREAT RATIO URINE    Lipid Profile                  Instructed to follow up if symptoms worsen or fail to improve, ER/UC precautions discussed as well    Alexus Nicole MD  [facility], [address]   [place] [address]  Phone: [phone]                .

## 2023-03-24 ENCOUNTER — APPOINTMENT (OUTPATIENT)
Dept: RADIOLOGY | Facility: IMAGING CENTER | Age: 78
End: 2023-03-24
Payer: MEDICARE

## 2023-03-24 ENCOUNTER — OUTPATIENT (OUTPATIENT)
Dept: OUTPATIENT SERVICES | Facility: HOSPITAL | Age: 78
LOS: 1 days | End: 2023-03-24
Payer: MEDICARE

## 2023-03-24 DIAGNOSIS — Z98.49 CATARACT EXTRACTION STATUS, UNSPECIFIED EYE: Chronic | ICD-10-CM

## 2023-03-24 DIAGNOSIS — Z98.890 OTHER SPECIFIED POSTPROCEDURAL STATES: Chronic | ICD-10-CM

## 2023-03-24 DIAGNOSIS — Z90.721 ACQUIRED ABSENCE OF OVARIES, UNILATERAL: Chronic | ICD-10-CM

## 2023-03-24 DIAGNOSIS — Z00.8 ENCOUNTER FOR OTHER GENERAL EXAMINATION: ICD-10-CM

## 2023-03-24 DIAGNOSIS — Z96.659 PRESENCE OF UNSPECIFIED ARTIFICIAL KNEE JOINT: Chronic | ICD-10-CM

## 2023-03-24 DIAGNOSIS — Z86.39 PERSONAL HISTORY OF OTHER ENDOCRINE, NUTRITIONAL AND METABOLIC DISEASE: Chronic | ICD-10-CM

## 2023-03-24 DIAGNOSIS — Z98.891 HISTORY OF UTERINE SCAR FROM PREVIOUS SURGERY: Chronic | ICD-10-CM

## 2023-03-24 PROCEDURE — 77080 DXA BONE DENSITY AXIAL: CPT

## 2023-03-24 PROCEDURE — 77080 DXA BONE DENSITY AXIAL: CPT | Mod: 26

## 2023-10-18 ENCOUNTER — OUTPATIENT (OUTPATIENT)
Dept: OUTPATIENT SERVICES | Facility: HOSPITAL | Age: 78
LOS: 1 days | End: 2023-10-18
Payer: MEDICARE

## 2023-10-18 ENCOUNTER — APPOINTMENT (OUTPATIENT)
Dept: CT IMAGING | Facility: IMAGING CENTER | Age: 78
End: 2023-10-18
Payer: MEDICARE

## 2023-10-18 DIAGNOSIS — Z90.721 ACQUIRED ABSENCE OF OVARIES, UNILATERAL: Chronic | ICD-10-CM

## 2023-10-18 DIAGNOSIS — Z98.890 OTHER SPECIFIED POSTPROCEDURAL STATES: Chronic | ICD-10-CM

## 2023-10-18 DIAGNOSIS — I71.21 ANEURYSM OF THE ASCENDING AORTA, WITHOUT RUPTURE: ICD-10-CM

## 2023-10-18 DIAGNOSIS — Z98.891 HISTORY OF UTERINE SCAR FROM PREVIOUS SURGERY: Chronic | ICD-10-CM

## 2023-10-18 DIAGNOSIS — Z98.49 CATARACT EXTRACTION STATUS, UNSPECIFIED EYE: Chronic | ICD-10-CM

## 2023-10-18 DIAGNOSIS — Z86.39 PERSONAL HISTORY OF OTHER ENDOCRINE, NUTRITIONAL AND METABOLIC DISEASE: Chronic | ICD-10-CM

## 2023-10-18 DIAGNOSIS — Z96.659 PRESENCE OF UNSPECIFIED ARTIFICIAL KNEE JOINT: Chronic | ICD-10-CM

## 2023-10-18 PROCEDURE — 71250 CT THORAX DX C-: CPT | Mod: 26,MH

## 2023-10-18 PROCEDURE — 71250 CT THORAX DX C-: CPT | Mod: MH

## 2023-10-22 ENCOUNTER — INPATIENT (INPATIENT)
Facility: HOSPITAL | Age: 78
LOS: 3 days | Discharge: HOME | End: 2023-10-26
Attending: GENERAL PRACTICE | Admitting: GENERAL PRACTICE
Payer: MEDICARE

## 2023-10-22 VITALS
OXYGEN SATURATION: 98 % | RESPIRATION RATE: 18 BRPM | SYSTOLIC BLOOD PRESSURE: 89 MMHG | DIASTOLIC BLOOD PRESSURE: 55 MMHG | HEART RATE: 108 BPM | TEMPERATURE: 101 F

## 2023-10-22 DIAGNOSIS — Z86.39 PERSONAL HISTORY OF OTHER ENDOCRINE, NUTRITIONAL AND METABOLIC DISEASE: Chronic | ICD-10-CM

## 2023-10-22 DIAGNOSIS — Z98.49 CATARACT EXTRACTION STATUS, UNSPECIFIED EYE: Chronic | ICD-10-CM

## 2023-10-22 DIAGNOSIS — Z96.659 PRESENCE OF UNSPECIFIED ARTIFICIAL KNEE JOINT: Chronic | ICD-10-CM

## 2023-10-22 DIAGNOSIS — Z98.890 OTHER SPECIFIED POSTPROCEDURAL STATES: Chronic | ICD-10-CM

## 2023-10-22 DIAGNOSIS — Z98.891 HISTORY OF UTERINE SCAR FROM PREVIOUS SURGERY: Chronic | ICD-10-CM

## 2023-10-22 DIAGNOSIS — N39.0 URINARY TRACT INFECTION, SITE NOT SPECIFIED: ICD-10-CM

## 2023-10-22 DIAGNOSIS — Z90.721 ACQUIRED ABSENCE OF OVARIES, UNILATERAL: Chronic | ICD-10-CM

## 2023-10-22 LAB
ALBUMIN SERPL ELPH-MCNC: 3.7 G/DL — SIGNIFICANT CHANGE UP (ref 3.3–5)
ALBUMIN SERPL ELPH-MCNC: 3.7 G/DL — SIGNIFICANT CHANGE UP (ref 3.3–5)
ALP SERPL-CCNC: 87 U/L — SIGNIFICANT CHANGE UP (ref 40–120)
ALP SERPL-CCNC: 87 U/L — SIGNIFICANT CHANGE UP (ref 40–120)
ALT FLD-CCNC: 26 U/L — SIGNIFICANT CHANGE UP (ref 4–33)
ALT FLD-CCNC: 26 U/L — SIGNIFICANT CHANGE UP (ref 4–33)
ANION GAP SERPL CALC-SCNC: 11 MMOL/L — SIGNIFICANT CHANGE UP (ref 7–14)
ANION GAP SERPL CALC-SCNC: 11 MMOL/L — SIGNIFICANT CHANGE UP (ref 7–14)
ANISOCYTOSIS BLD QL: SLIGHT — SIGNIFICANT CHANGE UP
ANISOCYTOSIS BLD QL: SLIGHT — SIGNIFICANT CHANGE UP
APPEARANCE UR: ABNORMAL
APPEARANCE UR: ABNORMAL
APTT BLD: 28.6 SEC — SIGNIFICANT CHANGE UP (ref 24.5–35.6)
APTT BLD: 28.6 SEC — SIGNIFICANT CHANGE UP (ref 24.5–35.6)
AST SERPL-CCNC: 44 U/L — HIGH (ref 4–32)
AST SERPL-CCNC: 44 U/L — HIGH (ref 4–32)
B PERT DNA SPEC QL NAA+PROBE: SIGNIFICANT CHANGE UP
B PERT DNA SPEC QL NAA+PROBE: SIGNIFICANT CHANGE UP
B PERT+PARAPERT DNA PNL SPEC NAA+PROBE: SIGNIFICANT CHANGE UP
B PERT+PARAPERT DNA PNL SPEC NAA+PROBE: SIGNIFICANT CHANGE UP
BACTERIA # UR AUTO: ABNORMAL /HPF
BACTERIA # UR AUTO: ABNORMAL /HPF
BASE EXCESS BLDV CALC-SCNC: 1.9 MMOL/L — SIGNIFICANT CHANGE UP (ref -2–3)
BASE EXCESS BLDV CALC-SCNC: 1.9 MMOL/L — SIGNIFICANT CHANGE UP (ref -2–3)
BASE EXCESS BLDV CALC-SCNC: 3.5 MMOL/L — HIGH (ref -2–3)
BASE EXCESS BLDV CALC-SCNC: 3.5 MMOL/L — HIGH (ref -2–3)
BASOPHILS # BLD AUTO: 0 K/UL — SIGNIFICANT CHANGE UP (ref 0–0.2)
BASOPHILS # BLD AUTO: 0 K/UL — SIGNIFICANT CHANGE UP (ref 0–0.2)
BASOPHILS NFR BLD AUTO: 0 % — SIGNIFICANT CHANGE UP (ref 0–2)
BASOPHILS NFR BLD AUTO: 0 % — SIGNIFICANT CHANGE UP (ref 0–2)
BILIRUB SERPL-MCNC: 0.4 MG/DL — SIGNIFICANT CHANGE UP (ref 0.2–1.2)
BILIRUB SERPL-MCNC: 0.4 MG/DL — SIGNIFICANT CHANGE UP (ref 0.2–1.2)
BILIRUB UR-MCNC: NEGATIVE — SIGNIFICANT CHANGE UP
BILIRUB UR-MCNC: NEGATIVE — SIGNIFICANT CHANGE UP
BLOOD GAS VENOUS COMPREHENSIVE RESULT: SIGNIFICANT CHANGE UP
BORDETELLA PARAPERTUSSIS (RAPRVP): SIGNIFICANT CHANGE UP
BORDETELLA PARAPERTUSSIS (RAPRVP): SIGNIFICANT CHANGE UP
BUN SERPL-MCNC: 20 MG/DL — SIGNIFICANT CHANGE UP (ref 7–23)
BUN SERPL-MCNC: 20 MG/DL — SIGNIFICANT CHANGE UP (ref 7–23)
C PNEUM DNA SPEC QL NAA+PROBE: SIGNIFICANT CHANGE UP
C PNEUM DNA SPEC QL NAA+PROBE: SIGNIFICANT CHANGE UP
CALCIUM SERPL-MCNC: 8.4 MG/DL — SIGNIFICANT CHANGE UP (ref 8.4–10.5)
CALCIUM SERPL-MCNC: 8.4 MG/DL — SIGNIFICANT CHANGE UP (ref 8.4–10.5)
CAST: 6 /LPF — HIGH (ref 0–4)
CAST: 6 /LPF — HIGH (ref 0–4)
CHLORIDE BLDV-SCNC: 105 MMOL/L — SIGNIFICANT CHANGE UP (ref 96–108)
CHLORIDE BLDV-SCNC: 105 MMOL/L — SIGNIFICANT CHANGE UP (ref 96–108)
CHLORIDE BLDV-SCNC: 107 MMOL/L — SIGNIFICANT CHANGE UP (ref 96–108)
CHLORIDE BLDV-SCNC: 107 MMOL/L — SIGNIFICANT CHANGE UP (ref 96–108)
CHLORIDE SERPL-SCNC: 106 MMOL/L — SIGNIFICANT CHANGE UP (ref 98–107)
CHLORIDE SERPL-SCNC: 106 MMOL/L — SIGNIFICANT CHANGE UP (ref 98–107)
CO2 BLDV-SCNC: 27.9 MMOL/L — HIGH (ref 22–26)
CO2 BLDV-SCNC: 27.9 MMOL/L — HIGH (ref 22–26)
CO2 BLDV-SCNC: 31.1 MMOL/L — HIGH (ref 22–26)
CO2 BLDV-SCNC: 31.1 MMOL/L — HIGH (ref 22–26)
CO2 SERPL-SCNC: 25 MMOL/L — SIGNIFICANT CHANGE UP (ref 22–31)
CO2 SERPL-SCNC: 25 MMOL/L — SIGNIFICANT CHANGE UP (ref 22–31)
COLOR SPEC: YELLOW — SIGNIFICANT CHANGE UP
COLOR SPEC: YELLOW — SIGNIFICANT CHANGE UP
CREAT SERPL-MCNC: 0.75 MG/DL — SIGNIFICANT CHANGE UP (ref 0.5–1.3)
CREAT SERPL-MCNC: 0.75 MG/DL — SIGNIFICANT CHANGE UP (ref 0.5–1.3)
DIFF PNL FLD: ABNORMAL
DIFF PNL FLD: ABNORMAL
EGFR: 81 ML/MIN/1.73M2 — SIGNIFICANT CHANGE UP
EGFR: 81 ML/MIN/1.73M2 — SIGNIFICANT CHANGE UP
EOSINOPHIL # BLD AUTO: 0.05 K/UL — SIGNIFICANT CHANGE UP (ref 0–0.5)
EOSINOPHIL # BLD AUTO: 0.05 K/UL — SIGNIFICANT CHANGE UP (ref 0–0.5)
EOSINOPHIL NFR BLD AUTO: 0.9 % — SIGNIFICANT CHANGE UP (ref 0–6)
EOSINOPHIL NFR BLD AUTO: 0.9 % — SIGNIFICANT CHANGE UP (ref 0–6)
FLUAV SUBTYP SPEC NAA+PROBE: SIGNIFICANT CHANGE UP
FLUAV SUBTYP SPEC NAA+PROBE: SIGNIFICANT CHANGE UP
FLUBV RNA SPEC QL NAA+PROBE: SIGNIFICANT CHANGE UP
FLUBV RNA SPEC QL NAA+PROBE: SIGNIFICANT CHANGE UP
GAS PNL BLDV: 136 MMOL/L — SIGNIFICANT CHANGE UP (ref 136–145)
GAS PNL BLDV: 136 MMOL/L — SIGNIFICANT CHANGE UP (ref 136–145)
GAS PNL BLDV: 138 MMOL/L — SIGNIFICANT CHANGE UP (ref 136–145)
GAS PNL BLDV: 138 MMOL/L — SIGNIFICANT CHANGE UP (ref 136–145)
GIANT PLATELETS BLD QL SMEAR: PRESENT — SIGNIFICANT CHANGE UP
GIANT PLATELETS BLD QL SMEAR: PRESENT — SIGNIFICANT CHANGE UP
GLUCOSE BLDV-MCNC: 123 MG/DL — HIGH (ref 70–99)
GLUCOSE BLDV-MCNC: 123 MG/DL — HIGH (ref 70–99)
GLUCOSE BLDV-MCNC: 161 MG/DL — HIGH (ref 70–99)
GLUCOSE BLDV-MCNC: 161 MG/DL — HIGH (ref 70–99)
GLUCOSE SERPL-MCNC: 180 MG/DL — HIGH (ref 70–99)
GLUCOSE SERPL-MCNC: 180 MG/DL — HIGH (ref 70–99)
GLUCOSE UR QL: NEGATIVE MG/DL — SIGNIFICANT CHANGE UP
GLUCOSE UR QL: NEGATIVE MG/DL — SIGNIFICANT CHANGE UP
HADV DNA SPEC QL NAA+PROBE: SIGNIFICANT CHANGE UP
HADV DNA SPEC QL NAA+PROBE: SIGNIFICANT CHANGE UP
HCO3 BLDV-SCNC: 27 MMOL/L — SIGNIFICANT CHANGE UP (ref 22–29)
HCO3 BLDV-SCNC: 27 MMOL/L — SIGNIFICANT CHANGE UP (ref 22–29)
HCO3 BLDV-SCNC: 30 MMOL/L — HIGH (ref 22–29)
HCO3 BLDV-SCNC: 30 MMOL/L — HIGH (ref 22–29)
HCOV 229E RNA SPEC QL NAA+PROBE: SIGNIFICANT CHANGE UP
HCOV 229E RNA SPEC QL NAA+PROBE: SIGNIFICANT CHANGE UP
HCOV HKU1 RNA SPEC QL NAA+PROBE: SIGNIFICANT CHANGE UP
HCOV HKU1 RNA SPEC QL NAA+PROBE: SIGNIFICANT CHANGE UP
HCOV NL63 RNA SPEC QL NAA+PROBE: SIGNIFICANT CHANGE UP
HCOV NL63 RNA SPEC QL NAA+PROBE: SIGNIFICANT CHANGE UP
HCOV OC43 RNA SPEC QL NAA+PROBE: SIGNIFICANT CHANGE UP
HCOV OC43 RNA SPEC QL NAA+PROBE: SIGNIFICANT CHANGE UP
HCT VFR BLD CALC: 35.1 % — SIGNIFICANT CHANGE UP (ref 34.5–45)
HCT VFR BLD CALC: 35.1 % — SIGNIFICANT CHANGE UP (ref 34.5–45)
HCT VFR BLDA CALC: 35 % — SIGNIFICANT CHANGE UP (ref 34.5–46.5)
HCT VFR BLDA CALC: 35 % — SIGNIFICANT CHANGE UP (ref 34.5–46.5)
HCT VFR BLDA CALC: 38 % — SIGNIFICANT CHANGE UP (ref 34.5–46.5)
HCT VFR BLDA CALC: 38 % — SIGNIFICANT CHANGE UP (ref 34.5–46.5)
HGB BLD CALC-MCNC: 11.8 G/DL — SIGNIFICANT CHANGE UP (ref 11.7–16.1)
HGB BLD CALC-MCNC: 11.8 G/DL — SIGNIFICANT CHANGE UP (ref 11.7–16.1)
HGB BLD CALC-MCNC: 12.6 G/DL — SIGNIFICANT CHANGE UP (ref 11.7–16.1)
HGB BLD CALC-MCNC: 12.6 G/DL — SIGNIFICANT CHANGE UP (ref 11.7–16.1)
HGB BLD-MCNC: 11.7 G/DL — SIGNIFICANT CHANGE UP (ref 11.5–15.5)
HGB BLD-MCNC: 11.7 G/DL — SIGNIFICANT CHANGE UP (ref 11.5–15.5)
HMPV RNA SPEC QL NAA+PROBE: SIGNIFICANT CHANGE UP
HMPV RNA SPEC QL NAA+PROBE: SIGNIFICANT CHANGE UP
HPIV1 RNA SPEC QL NAA+PROBE: SIGNIFICANT CHANGE UP
HPIV1 RNA SPEC QL NAA+PROBE: SIGNIFICANT CHANGE UP
HPIV2 RNA SPEC QL NAA+PROBE: SIGNIFICANT CHANGE UP
HPIV2 RNA SPEC QL NAA+PROBE: SIGNIFICANT CHANGE UP
HPIV3 RNA SPEC QL NAA+PROBE: SIGNIFICANT CHANGE UP
HPIV3 RNA SPEC QL NAA+PROBE: SIGNIFICANT CHANGE UP
HPIV4 RNA SPEC QL NAA+PROBE: SIGNIFICANT CHANGE UP
HPIV4 RNA SPEC QL NAA+PROBE: SIGNIFICANT CHANGE UP
IANC: 4.61 K/UL — SIGNIFICANT CHANGE UP (ref 1.8–7.4)
IANC: 4.61 K/UL — SIGNIFICANT CHANGE UP (ref 1.8–7.4)
INR BLD: 1.31 RATIO — HIGH (ref 0.85–1.18)
INR BLD: 1.31 RATIO — HIGH (ref 0.85–1.18)
KETONES UR-MCNC: NEGATIVE MG/DL — SIGNIFICANT CHANGE UP
KETONES UR-MCNC: NEGATIVE MG/DL — SIGNIFICANT CHANGE UP
LACTATE BLDV-MCNC: 1.1 MMOL/L — SIGNIFICANT CHANGE UP (ref 0.5–2)
LACTATE BLDV-MCNC: 1.1 MMOL/L — SIGNIFICANT CHANGE UP (ref 0.5–2)
LACTATE BLDV-MCNC: 2 MMOL/L — SIGNIFICANT CHANGE UP (ref 0.5–2)
LACTATE BLDV-MCNC: 2 MMOL/L — SIGNIFICANT CHANGE UP (ref 0.5–2)
LEUKOCYTE ESTERASE UR-ACNC: ABNORMAL
LEUKOCYTE ESTERASE UR-ACNC: ABNORMAL
LIDOCAIN IGE QN: 18 U/L — SIGNIFICANT CHANGE UP (ref 7–60)
LIDOCAIN IGE QN: 18 U/L — SIGNIFICANT CHANGE UP (ref 7–60)
LYMPHOCYTES # BLD AUTO: 0.05 K/UL — LOW (ref 1–3.3)
LYMPHOCYTES # BLD AUTO: 0.05 K/UL — LOW (ref 1–3.3)
LYMPHOCYTES # BLD AUTO: 0.9 % — LOW (ref 13–44)
LYMPHOCYTES # BLD AUTO: 0.9 % — LOW (ref 13–44)
M PNEUMO DNA SPEC QL NAA+PROBE: SIGNIFICANT CHANGE UP
M PNEUMO DNA SPEC QL NAA+PROBE: SIGNIFICANT CHANGE UP
MACROCYTES BLD QL: SLIGHT — SIGNIFICANT CHANGE UP
MACROCYTES BLD QL: SLIGHT — SIGNIFICANT CHANGE UP
MAGNESIUM SERPL-MCNC: 1.9 MG/DL — SIGNIFICANT CHANGE UP (ref 1.6–2.6)
MAGNESIUM SERPL-MCNC: 1.9 MG/DL — SIGNIFICANT CHANGE UP (ref 1.6–2.6)
MANUAL SMEAR VERIFICATION: SIGNIFICANT CHANGE UP
MANUAL SMEAR VERIFICATION: SIGNIFICANT CHANGE UP
MCHC RBC-ENTMCNC: 29.8 PG — SIGNIFICANT CHANGE UP (ref 27–34)
MCHC RBC-ENTMCNC: 29.8 PG — SIGNIFICANT CHANGE UP (ref 27–34)
MCHC RBC-ENTMCNC: 33.3 GM/DL — SIGNIFICANT CHANGE UP (ref 32–36)
MCHC RBC-ENTMCNC: 33.3 GM/DL — SIGNIFICANT CHANGE UP (ref 32–36)
MCV RBC AUTO: 89.5 FL — SIGNIFICANT CHANGE UP (ref 80–100)
MCV RBC AUTO: 89.5 FL — SIGNIFICANT CHANGE UP (ref 80–100)
MICROCYTES BLD QL: SLIGHT — SIGNIFICANT CHANGE UP
MICROCYTES BLD QL: SLIGHT — SIGNIFICANT CHANGE UP
MONOCYTES # BLD AUTO: 0.27 K/UL — SIGNIFICANT CHANGE UP (ref 0–0.9)
MONOCYTES # BLD AUTO: 0.27 K/UL — SIGNIFICANT CHANGE UP (ref 0–0.9)
MONOCYTES NFR BLD AUTO: 5.2 % — SIGNIFICANT CHANGE UP (ref 2–14)
MONOCYTES NFR BLD AUTO: 5.2 % — SIGNIFICANT CHANGE UP (ref 2–14)
NEUTROPHILS # BLD AUTO: 4.59 K/UL — SIGNIFICANT CHANGE UP (ref 1.8–7.4)
NEUTROPHILS # BLD AUTO: 4.59 K/UL — SIGNIFICANT CHANGE UP (ref 1.8–7.4)
NEUTROPHILS NFR BLD AUTO: 86 % — HIGH (ref 43–77)
NEUTROPHILS NFR BLD AUTO: 86 % — HIGH (ref 43–77)
NEUTS BAND # BLD: 3.5 % — SIGNIFICANT CHANGE UP (ref 0–6)
NEUTS BAND # BLD: 3.5 % — SIGNIFICANT CHANGE UP (ref 0–6)
NITRITE UR-MCNC: NEGATIVE — SIGNIFICANT CHANGE UP
NITRITE UR-MCNC: NEGATIVE — SIGNIFICANT CHANGE UP
OVALOCYTES BLD QL SMEAR: SLIGHT — SIGNIFICANT CHANGE UP
OVALOCYTES BLD QL SMEAR: SLIGHT — SIGNIFICANT CHANGE UP
PCO2 BLDV: 41 MMHG — SIGNIFICANT CHANGE UP (ref 39–52)
PCO2 BLDV: 41 MMHG — SIGNIFICANT CHANGE UP (ref 39–52)
PCO2 BLDV: 50 MMHG — SIGNIFICANT CHANGE UP (ref 39–52)
PCO2 BLDV: 50 MMHG — SIGNIFICANT CHANGE UP (ref 39–52)
PH BLDV: 7.38 — SIGNIFICANT CHANGE UP (ref 7.32–7.43)
PH BLDV: 7.38 — SIGNIFICANT CHANGE UP (ref 7.32–7.43)
PH BLDV: 7.42 — SIGNIFICANT CHANGE UP (ref 7.32–7.43)
PH BLDV: 7.42 — SIGNIFICANT CHANGE UP (ref 7.32–7.43)
PH UR: 6 — SIGNIFICANT CHANGE UP (ref 5–8)
PH UR: 6 — SIGNIFICANT CHANGE UP (ref 5–8)
PLAT MORPH BLD: NORMAL — SIGNIFICANT CHANGE UP
PLAT MORPH BLD: NORMAL — SIGNIFICANT CHANGE UP
PLATELET # BLD AUTO: 140 K/UL — LOW (ref 150–400)
PLATELET # BLD AUTO: 140 K/UL — LOW (ref 150–400)
PLATELET COUNT - ESTIMATE: ABNORMAL
PLATELET COUNT - ESTIMATE: ABNORMAL
PO2 BLDV: 46 MMHG — HIGH (ref 25–45)
PO2 BLDV: 46 MMHG — HIGH (ref 25–45)
PO2 BLDV: <20 MMHG — LOW (ref 25–45)
PO2 BLDV: <20 MMHG — LOW (ref 25–45)
POIKILOCYTOSIS BLD QL AUTO: SLIGHT — SIGNIFICANT CHANGE UP
POIKILOCYTOSIS BLD QL AUTO: SLIGHT — SIGNIFICANT CHANGE UP
POTASSIUM BLDV-SCNC: 2.9 MMOL/L — CRITICAL LOW (ref 3.5–5.1)
POTASSIUM BLDV-SCNC: 2.9 MMOL/L — CRITICAL LOW (ref 3.5–5.1)
POTASSIUM BLDV-SCNC: 3.5 MMOL/L — SIGNIFICANT CHANGE UP (ref 3.5–5.1)
POTASSIUM BLDV-SCNC: 3.5 MMOL/L — SIGNIFICANT CHANGE UP (ref 3.5–5.1)
POTASSIUM SERPL-MCNC: 3.2 MMOL/L — LOW (ref 3.5–5.3)
POTASSIUM SERPL-MCNC: 3.2 MMOL/L — LOW (ref 3.5–5.3)
POTASSIUM SERPL-SCNC: 3.2 MMOL/L — LOW (ref 3.5–5.3)
POTASSIUM SERPL-SCNC: 3.2 MMOL/L — LOW (ref 3.5–5.3)
PROT SERPL-MCNC: 6.1 G/DL — SIGNIFICANT CHANGE UP (ref 6–8.3)
PROT SERPL-MCNC: 6.1 G/DL — SIGNIFICANT CHANGE UP (ref 6–8.3)
PROT UR-MCNC: 300 MG/DL
PROT UR-MCNC: 300 MG/DL
PROTHROM AB SERPL-ACNC: 14.6 SEC — HIGH (ref 9.5–13)
PROTHROM AB SERPL-ACNC: 14.6 SEC — HIGH (ref 9.5–13)
RAPID RVP RESULT: SIGNIFICANT CHANGE UP
RAPID RVP RESULT: SIGNIFICANT CHANGE UP
RBC # BLD: 3.92 M/UL — SIGNIFICANT CHANGE UP (ref 3.8–5.2)
RBC # BLD: 3.92 M/UL — SIGNIFICANT CHANGE UP (ref 3.8–5.2)
RBC # FLD: 13.1 % — SIGNIFICANT CHANGE UP (ref 10.3–14.5)
RBC # FLD: 13.1 % — SIGNIFICANT CHANGE UP (ref 10.3–14.5)
RBC BLD AUTO: NORMAL — SIGNIFICANT CHANGE UP
RBC BLD AUTO: NORMAL — SIGNIFICANT CHANGE UP
RBC CASTS # UR COMP ASSIST: 45 /HPF — HIGH (ref 0–4)
RBC CASTS # UR COMP ASSIST: 45 /HPF — HIGH (ref 0–4)
REVIEW: SIGNIFICANT CHANGE UP
REVIEW: SIGNIFICANT CHANGE UP
RSV RNA SPEC QL NAA+PROBE: SIGNIFICANT CHANGE UP
RSV RNA SPEC QL NAA+PROBE: SIGNIFICANT CHANGE UP
RV+EV RNA SPEC QL NAA+PROBE: SIGNIFICANT CHANGE UP
RV+EV RNA SPEC QL NAA+PROBE: SIGNIFICANT CHANGE UP
SAO2 % BLDV: 32.2 % — LOW (ref 67–88)
SAO2 % BLDV: 32.2 % — LOW (ref 67–88)
SAO2 % BLDV: 79.6 % — SIGNIFICANT CHANGE UP (ref 67–88)
SAO2 % BLDV: 79.6 % — SIGNIFICANT CHANGE UP (ref 67–88)
SARS-COV-2 RNA SPEC QL NAA+PROBE: SIGNIFICANT CHANGE UP
SARS-COV-2 RNA SPEC QL NAA+PROBE: SIGNIFICANT CHANGE UP
SODIUM SERPL-SCNC: 142 MMOL/L — SIGNIFICANT CHANGE UP (ref 135–145)
SODIUM SERPL-SCNC: 142 MMOL/L — SIGNIFICANT CHANGE UP (ref 135–145)
SP GR SPEC: 1.01 — SIGNIFICANT CHANGE UP (ref 1–1.03)
SP GR SPEC: 1.01 — SIGNIFICANT CHANGE UP (ref 1–1.03)
SPHEROCYTES BLD QL SMEAR: SLIGHT — SIGNIFICANT CHANGE UP
SPHEROCYTES BLD QL SMEAR: SLIGHT — SIGNIFICANT CHANGE UP
SQUAMOUS # UR AUTO: 1 /HPF — SIGNIFICANT CHANGE UP (ref 0–5)
SQUAMOUS # UR AUTO: 1 /HPF — SIGNIFICANT CHANGE UP (ref 0–5)
TSH SERPL-MCNC: 0.18 UIU/ML — LOW (ref 0.27–4.2)
TSH SERPL-MCNC: 0.18 UIU/ML — LOW (ref 0.27–4.2)
UROBILINOGEN FLD QL: 1 MG/DL — SIGNIFICANT CHANGE UP (ref 0.2–1)
UROBILINOGEN FLD QL: 1 MG/DL — SIGNIFICANT CHANGE UP (ref 0.2–1)
VARIANT LYMPHS # BLD: 3.5 % — SIGNIFICANT CHANGE UP (ref 0–6)
VARIANT LYMPHS # BLD: 3.5 % — SIGNIFICANT CHANGE UP (ref 0–6)
WBC # BLD: 5.13 K/UL — SIGNIFICANT CHANGE UP (ref 3.8–10.5)
WBC # BLD: 5.13 K/UL — SIGNIFICANT CHANGE UP (ref 3.8–10.5)
WBC # FLD AUTO: 5.13 K/UL — SIGNIFICANT CHANGE UP (ref 3.8–10.5)
WBC # FLD AUTO: 5.13 K/UL — SIGNIFICANT CHANGE UP (ref 3.8–10.5)
WBC UR QL: 798 /HPF — HIGH (ref 0–5)
WBC UR QL: 798 /HPF — HIGH (ref 0–5)

## 2023-10-22 PROCEDURE — 99223 1ST HOSP IP/OBS HIGH 75: CPT

## 2023-10-22 PROCEDURE — 99285 EMERGENCY DEPT VISIT HI MDM: CPT

## 2023-10-22 PROCEDURE — 71045 X-RAY EXAM CHEST 1 VIEW: CPT | Mod: 26

## 2023-10-22 RX ORDER — POTASSIUM CHLORIDE 20 MEQ
10 PACKET (EA) ORAL
Refills: 0 | Status: COMPLETED | OUTPATIENT
Start: 2023-10-22 | End: 2023-10-22

## 2023-10-22 RX ORDER — ENOXAPARIN SODIUM 100 MG/ML
40 INJECTION SUBCUTANEOUS EVERY 24 HOURS
Refills: 0 | Status: DISCONTINUED | OUTPATIENT
Start: 2023-10-22 | End: 2023-10-26

## 2023-10-22 RX ORDER — LEVOTHYROXINE SODIUM 125 MCG
1 TABLET ORAL
Refills: 0 | DISCHARGE

## 2023-10-22 RX ORDER — CHOLECALCIFEROL (VITAMIN D3) 125 MCG
1 CAPSULE ORAL
Qty: 0 | Refills: 0 | DISCHARGE

## 2023-10-22 RX ORDER — LANOLIN ALCOHOL/MO/W.PET/CERES
3 CREAM (GRAM) TOPICAL AT BEDTIME
Refills: 0 | Status: DISCONTINUED | OUTPATIENT
Start: 2023-10-22 | End: 2023-10-26

## 2023-10-22 RX ORDER — SODIUM CHLORIDE 9 MG/ML
1000 INJECTION INTRAMUSCULAR; INTRAVENOUS; SUBCUTANEOUS ONCE
Refills: 0 | Status: COMPLETED | OUTPATIENT
Start: 2023-10-22 | End: 2023-10-22

## 2023-10-22 RX ORDER — ONDANSETRON 8 MG/1
4 TABLET, FILM COATED ORAL EVERY 8 HOURS
Refills: 0 | Status: DISCONTINUED | OUTPATIENT
Start: 2023-10-22 | End: 2023-10-26

## 2023-10-22 RX ORDER — AMLODIPINE BESYLATE 2.5 MG/1
1 TABLET ORAL
Refills: 0 | DISCHARGE

## 2023-10-22 RX ORDER — ACETAMINOPHEN 500 MG
650 TABLET ORAL EVERY 6 HOURS
Refills: 0 | Status: DISCONTINUED | OUTPATIENT
Start: 2023-10-22 | End: 2023-10-26

## 2023-10-22 RX ORDER — CEFTRIAXONE 500 MG/1
2000 INJECTION, POWDER, FOR SOLUTION INTRAMUSCULAR; INTRAVENOUS ONCE
Refills: 0 | Status: COMPLETED | OUTPATIENT
Start: 2023-10-22 | End: 2023-10-22

## 2023-10-22 RX ORDER — CEFTRIAXONE 500 MG/1
2000 INJECTION, POWDER, FOR SOLUTION INTRAMUSCULAR; INTRAVENOUS EVERY 24 HOURS
Refills: 0 | Status: DISCONTINUED | OUTPATIENT
Start: 2023-10-23 | End: 2023-10-26

## 2023-10-22 RX ORDER — LEVOTHYROXINE SODIUM 125 MCG
100 TABLET ORAL DAILY
Refills: 0 | Status: DISCONTINUED | OUTPATIENT
Start: 2023-10-22 | End: 2023-10-26

## 2023-10-22 RX ADMIN — Medication 100 MILLIEQUIVALENT(S): at 18:03

## 2023-10-22 RX ADMIN — Medication 100 MILLIEQUIVALENT(S): at 19:21

## 2023-10-22 RX ADMIN — Medication 100 MILLIEQUIVALENT(S): at 21:00

## 2023-10-22 RX ADMIN — CEFTRIAXONE 100 MILLIGRAM(S): 500 INJECTION, POWDER, FOR SOLUTION INTRAMUSCULAR; INTRAVENOUS at 18:44

## 2023-10-22 RX ADMIN — Medication 650 MILLIGRAM(S): at 23:00

## 2023-10-22 RX ADMIN — SODIUM CHLORIDE 1000 MILLILITER(S): 9 INJECTION INTRAMUSCULAR; INTRAVENOUS; SUBCUTANEOUS at 21:06

## 2023-10-22 RX ADMIN — SODIUM CHLORIDE 1000 MILLILITER(S): 9 INJECTION INTRAMUSCULAR; INTRAVENOUS; SUBCUTANEOUS at 18:15

## 2023-10-22 NOTE — ED PROVIDER NOTE - ATTENDING CONTRIBUTION TO CARE
Brief HPI:  78-year-old female past medical history of thoracic aortic aneurysm, hypertension, thyroidectomy presents with fever, chills, dysuria, nausea, vomiting starting approximately 1 day ago.  No recent travel or sick contacts.  Patient reports minimal suprapubic abdominal pain.  Denies headache, cough, diarrhea, rash.    Vitals:   Reviewed    Exam:    GEN:  Non-toxic appearing, non-distressed, speaking full sentences, non-diaphoretic, AAOx3  HEENT:  NCAT, neck supple, EOMI, PERRLA, sclera anicteric, no conjunctival pallor or injection, no stridor, normal voice, no tonsillar exudate, uvula midline  CV:  regular rhythm and rate, s1/s2 audible, no murmurs, rubs or gallops, peripheral pulses 2+ and symmetric  PULM:  non-labored respirations, lungs clear to auscultation bilaterally, no wheezes, crackles or rales  ABD:  non distended, non-tender, no rebound, no guarding, negative Nichols's sign, bowel sounds normal, no cvat  MSK:  no gross deformity, non-tender extremities and joints, range of motion grossly normal appearing, no extremity edema, extremities warm and well perfused   NEURO:  AAOx3, CN II-XII intact, motor 5/5 in upper and lower extremities bilaterally, sensation grossly intact in extremities and trunk, finger to nose testing wnl, no nystagmus, negative Romberg, no pronator drift, no gait deficit  SKIN:  warm, dry, no rash or vesicles     A/P:  78-year-old female past medical history of thoracic aortic aneurysm, hypertension, thyroidectomy presents with fever, chills, dysuria, nausea, vomiting starting approximately 1 day ago.   Hypotensive on arrival, tachycardic.  Patient is well perfused on exam, good mental status.  Abdomen nontender, low concern for acute surgical pathology.  High suspicion for urinary tract infection versus viral syndrome.  Will send labs, cultures, supportive care.  Disposition pending.

## 2023-10-22 NOTE — ED ADULT TRIAGE NOTE - CHIEF COMPLAINT QUOTE
pt c/o fever/chills since last night with vomiting and abd pain.  Hx:  enlarged aorta, thyroidectomy

## 2023-10-22 NOTE — H&P ADULT - PROBLEM SELECTOR PLAN 1
-pt p/w fevers, tachy with hypotension c/f severe sepsis 2/2 UTI/pyelo  -c/w CTX 2 g   -f/u urine and blood cultures   -will need abd imaging if any HD instability or worsening leukocytosis

## 2023-10-22 NOTE — ED PROVIDER NOTE - CLINICAL SUMMARY MEDICAL DECISION MAKING FREE TEXT BOX
Impression: 79yo F pmh enlarged aortic aneurysm, HTN, thyroidectomy (2019) s/p thyroid cancer p/w fever (tmax 104), chills, cough, nausea, dry heaves. Their symptoms and exam findings in the setting of their pmhx are concerning for viral illness, pna, metabolic abnormality, UTI.    Ordered labs, imaging, medications for diagnosis, management, and treatment.

## 2023-10-22 NOTE — ED PROVIDER NOTE - OBJECTIVE STATEMENT
77yo F pmh enlarged aortic aneurysm, HTN, thyroidectomy (2019) s/p thyroid cancer p/w fever (tmax 104), chills, cough, nausea, dry heaves. Their pain/symptom is moderate, started 1 day ago, constant, non-mediating with rest, associated with urinary frequency. Started randomly. Denies trauma, falls, headache, AMS, dizziness, syncope, shortness of breath, rhinorrhea, congestion, chest pain, palpitations, abdominal pain, diarrhea, constipation, melena, hematochezia, dysuria, hematuria, flank pain, skin changes, p.o. issues, issues urinating, issues stooling, issues with ambulation, back pain.    PCP: Dr. Jerrod Vasquez, PCP   Pharmacy: [facility] [id number]

## 2023-10-22 NOTE — H&P ADULT - ASSESSMENT
79yo F pmh enlarged aortic aneurysm, HTN, thyroidectomy (2019) s/p thyroid cancer p/w severe sepsis 2/2 pyelonephritis/UTI

## 2023-10-22 NOTE — ED ADULT NURSE NOTE - OBJECTIVE STATEMENT
pt c/o fever/chills since last night with vomiting and abd pain.  Hx:  enlarged aorta, thyroidectomy. Patient A&OX4. No distress noted. Breathing non-labored. Denies CP, no SOB noted. Labs sent. Left 20G IVL in place and tolerating well. Plan of care in progress.

## 2023-10-22 NOTE — H&P ADULT - PROBLEM SELECTOR PLAN 2
-TSH suppressed. Will lower levothyroxine for now however should touch base with outpt endocrinologist regarding goal TSH. TSH target is lower in patients with a history of thyroid cancer so current TSH may actually be at target

## 2023-10-22 NOTE — H&P ADULT - HISTORY OF PRESENT ILLNESS
77yo F pmh enlarged aortic aneurysm, HTN, thyroidectomy (2019) s/p thyroid cancer p/w fever (tmax 104), chills, cough, nausea, dry heaves. Pt reports feeling unwell for about 2 weeks with new  urinary frequency. Symptoms progressed and developed fevers with intense rigors prompting presentation. She denies chest pain, shortness of breath, rhinorrhea, congestion, abdominal pain, diarrhea, constipation, melena, hematochezia, dysuria, hematuria, flank pain. Reports she was told her thyroid function was abnormal and advised to pause synthroid.

## 2023-10-22 NOTE — H&P ADULT - NSHPLABSRESULTS_GEN_ALL_CORE
(10-22 @ 16:53)                      11.7  5.13 )-----------( 140                 35.1    Neutrophils = 4.59 (86.0%)  Lymphocytes = 0.05 (0.9%)  Eosinophils = 0.05 (0.9%)  Basophils = 0.00 (0.0%)  Monocytes = 0.27 (5.2%)  Bands = 3.5%    10-22    142  |  106  |  20  ----------------------------<  180<H>  3.2<L>   |  25  |  0.75    Ca    8.4      22 Oct 2023 16:53  Mg     1.90     10-22    TPro  6.1  /  Alb  3.7  /  TBili  0.4  /  DBili  x   /  AST  44<H>  /  ALT  26  /  AlkPhos  87  10-22    ( 22 Oct 2023 16:53 )   PT: 14.6 sec;   INR: 1.31 ratio;       PTT:28.6 sec      RVP:(10-22 @ 18:03)  NotDetec      Venous Blood Gas:  10-22 @ 19:25  7.38/50/<20/30/32.2  VBG Lactate: 1.1  Venous Blood Gas:  10-22 @ 15:53  7.42/41/46/27/79.6  VBG Lactate: 2.0      Urinalysis Basic - ( 22 Oct 2023 17:05 )    Color: Yellow / Appearance: Turbid / S.014 / pH: x  Gluc: x / Ketone: Negative mg/dL  / Bili: Negative / Urobili: 1.0 mg/dL   Blood: x / Protein: 300 mg/dL / Nitrite: Negative   Leuk Esterase: Moderate / RBC: 45 /HPF /  /HPF   Sq Epi: x / Non Sq Epi: 1 /HPF / Bacteria: Many /HPF    < from: Xray Chest 1 View- PORTABLE-Urgent (10.22.23 @ 19:05) >    INTERPRETATION:  No focal consolidation.    < end of copied text >        Labs personally reviewed  Imaging reviewed  EKG: NSR, no st elevations

## 2023-10-22 NOTE — H&P ADULT - NSHPREVIEWOFSYSTEMS_GEN_ALL_CORE
CONSTITUTIONAL: +fever +chills +fatigue   HEENT:  Eyes:  No visual loss, blurred vision, double vision or yellow sclerae. Ears, Nose, Throat:  No hearing loss, sneezing, congestion, runny nose or sore throat.  SKIN:  No rash or itching.  CARDIOVASCULAR:  No chest pain, chest pressure or chest discomfort. No palpitations or edema.  RESPIRATORY:  No shortness of breath, cough or sputum.  GASTROINTESTINAL:  No anorexia, nausea, vomiting or diarrhea. No abdominal pain or blood.  GENITOURINARY:  +urinary frequency Denies hematuria, dysuria.   NEUROLOGICAL:  No headache, dizziness, syncope, paralysis, ataxia, numbness or tingling in the extremities. No change in bowel or bladder control.  MUSCULOSKELETAL:  No muscle, back pain, joint pain or stiffness.  PSYCHIATRIC:  No history of depression or anxiety.  ENDOCRINOLOGIC:  No reports of sweating, cold or heat intolerance. No polyuria or polydipsia.  ALLERGIES:  No history of asthma, hives, eczema or rhinitis.

## 2023-10-22 NOTE — H&P ADULT - NSHPPHYSICALEXAM_GEN_ALL_CORE
GENERAL APPEARANCE: Well developed, alert and cooperative. NAD.   HEENT:  PERRL, EOMI. External auditory canals normal, hearing grossly intact.  NECK: Neck supple, non-tender   CARDIAC: Normal S1 and S2. No S3, S4 or murmurs. Rhythm is regular.  LUNGS: Clear to auscultation and percussion without rales, rhonchi, wheezing or diminished breath sounds.  ABDOMEN: Positive bowel sounds. Soft, nondistended, nontender. No guarding or rebound.   MUSCULOSKELETAL: ROM intact spine and extremities. No joint erythema or tenderness.   BACK: normal posture, no flank tenderness   EXTREMITIES: No significant deformity or joint abnormality. No edema. Peripheral pulses intact.   NEUROLOGICAL: CN II-XII intact. Strength and sensation symmetric and intact throughout.   SKIN: Skin normal color, texture and turgor with no lesions or eruptions.  PSYCHIATRIC: AOx3.Normal affect and behavior.

## 2023-10-23 DIAGNOSIS — E03.9 HYPOTHYROIDISM, UNSPECIFIED: ICD-10-CM

## 2023-10-23 DIAGNOSIS — Z29.9 ENCOUNTER FOR PROPHYLACTIC MEASURES, UNSPECIFIED: ICD-10-CM

## 2023-10-23 DIAGNOSIS — A41.9 SEPSIS, UNSPECIFIED ORGANISM: ICD-10-CM

## 2023-10-23 DIAGNOSIS — I71.9 AORTIC ANEURYSM OF UNSPECIFIED SITE, WITHOUT RUPTURE: ICD-10-CM

## 2023-10-23 DIAGNOSIS — D69.6 THROMBOCYTOPENIA, UNSPECIFIED: ICD-10-CM

## 2023-10-23 LAB
A1C WITH ESTIMATED AVERAGE GLUCOSE RESULT: 4.9 % — SIGNIFICANT CHANGE UP (ref 4–5.6)
A1C WITH ESTIMATED AVERAGE GLUCOSE RESULT: 4.9 % — SIGNIFICANT CHANGE UP (ref 4–5.6)
ALBUMIN SERPL ELPH-MCNC: 3.1 G/DL — LOW (ref 3.3–5)
ALBUMIN SERPL ELPH-MCNC: 3.1 G/DL — LOW (ref 3.3–5)
ALP SERPL-CCNC: 68 U/L — SIGNIFICANT CHANGE UP (ref 40–120)
ALP SERPL-CCNC: 68 U/L — SIGNIFICANT CHANGE UP (ref 40–120)
ALT FLD-CCNC: 29 U/L — SIGNIFICANT CHANGE UP (ref 4–33)
ALT FLD-CCNC: 29 U/L — SIGNIFICANT CHANGE UP (ref 4–33)
ANION GAP SERPL CALC-SCNC: 6 MMOL/L — LOW (ref 7–14)
ANION GAP SERPL CALC-SCNC: 6 MMOL/L — LOW (ref 7–14)
AST SERPL-CCNC: 39 U/L — HIGH (ref 4–32)
AST SERPL-CCNC: 39 U/L — HIGH (ref 4–32)
BILIRUB SERPL-MCNC: <0.2 MG/DL — SIGNIFICANT CHANGE UP (ref 0.2–1.2)
BILIRUB SERPL-MCNC: <0.2 MG/DL — SIGNIFICANT CHANGE UP (ref 0.2–1.2)
BUN SERPL-MCNC: 16 MG/DL — SIGNIFICANT CHANGE UP (ref 7–23)
BUN SERPL-MCNC: 16 MG/DL — SIGNIFICANT CHANGE UP (ref 7–23)
CALCIUM SERPL-MCNC: 7.9 MG/DL — LOW (ref 8.4–10.5)
CALCIUM SERPL-MCNC: 7.9 MG/DL — LOW (ref 8.4–10.5)
CHLORIDE SERPL-SCNC: 112 MMOL/L — HIGH (ref 98–107)
CHLORIDE SERPL-SCNC: 112 MMOL/L — HIGH (ref 98–107)
CO2 SERPL-SCNC: 24 MMOL/L — SIGNIFICANT CHANGE UP (ref 22–31)
CO2 SERPL-SCNC: 24 MMOL/L — SIGNIFICANT CHANGE UP (ref 22–31)
CREAT SERPL-MCNC: 0.75 MG/DL — SIGNIFICANT CHANGE UP (ref 0.5–1.3)
CREAT SERPL-MCNC: 0.75 MG/DL — SIGNIFICANT CHANGE UP (ref 0.5–1.3)
E COLI DNA BLD POS QL NAA+NON-PROBE: SIGNIFICANT CHANGE UP
E COLI DNA BLD POS QL NAA+NON-PROBE: SIGNIFICANT CHANGE UP
EGFR: 81 ML/MIN/1.73M2 — SIGNIFICANT CHANGE UP
EGFR: 81 ML/MIN/1.73M2 — SIGNIFICANT CHANGE UP
ESTIMATED AVERAGE GLUCOSE: 94 — SIGNIFICANT CHANGE UP
ESTIMATED AVERAGE GLUCOSE: 94 — SIGNIFICANT CHANGE UP
GLUCOSE SERPL-MCNC: 107 MG/DL — HIGH (ref 70–99)
GLUCOSE SERPL-MCNC: 107 MG/DL — HIGH (ref 70–99)
GRAM STN FLD: SIGNIFICANT CHANGE UP
GRAM STN FLD: SIGNIFICANT CHANGE UP
HCT VFR BLD CALC: 31.4 % — LOW (ref 34.5–45)
HCT VFR BLD CALC: 31.4 % — LOW (ref 34.5–45)
HCV AB S/CO SERPL IA: 0.08 S/CO — SIGNIFICANT CHANGE UP (ref 0–0.99)
HCV AB S/CO SERPL IA: 0.08 S/CO — SIGNIFICANT CHANGE UP (ref 0–0.99)
HCV AB SERPL-IMP: SIGNIFICANT CHANGE UP
HCV AB SERPL-IMP: SIGNIFICANT CHANGE UP
HGB BLD-MCNC: 10.6 G/DL — LOW (ref 11.5–15.5)
HGB BLD-MCNC: 10.6 G/DL — LOW (ref 11.5–15.5)
MAGNESIUM SERPL-MCNC: 2 MG/DL — SIGNIFICANT CHANGE UP (ref 1.6–2.6)
MAGNESIUM SERPL-MCNC: 2 MG/DL — SIGNIFICANT CHANGE UP (ref 1.6–2.6)
MCHC RBC-ENTMCNC: 30.1 PG — SIGNIFICANT CHANGE UP (ref 27–34)
MCHC RBC-ENTMCNC: 30.1 PG — SIGNIFICANT CHANGE UP (ref 27–34)
MCHC RBC-ENTMCNC: 33.8 GM/DL — SIGNIFICANT CHANGE UP (ref 32–36)
MCHC RBC-ENTMCNC: 33.8 GM/DL — SIGNIFICANT CHANGE UP (ref 32–36)
MCV RBC AUTO: 89.2 FL — SIGNIFICANT CHANGE UP (ref 80–100)
MCV RBC AUTO: 89.2 FL — SIGNIFICANT CHANGE UP (ref 80–100)
METHOD TYPE: SIGNIFICANT CHANGE UP
METHOD TYPE: SIGNIFICANT CHANGE UP
NRBC # BLD: 0 /100 WBCS — SIGNIFICANT CHANGE UP (ref 0–0)
NRBC # BLD: 0 /100 WBCS — SIGNIFICANT CHANGE UP (ref 0–0)
NRBC # FLD: 0 K/UL — SIGNIFICANT CHANGE UP (ref 0–0)
NRBC # FLD: 0 K/UL — SIGNIFICANT CHANGE UP (ref 0–0)
PHOSPHATE SERPL-MCNC: 2.8 MG/DL — SIGNIFICANT CHANGE UP (ref 2.5–4.5)
PHOSPHATE SERPL-MCNC: 2.8 MG/DL — SIGNIFICANT CHANGE UP (ref 2.5–4.5)
PLATELET # BLD AUTO: 148 K/UL — LOW (ref 150–400)
PLATELET # BLD AUTO: 148 K/UL — LOW (ref 150–400)
POTASSIUM SERPL-MCNC: 3.8 MMOL/L — SIGNIFICANT CHANGE UP (ref 3.5–5.3)
POTASSIUM SERPL-MCNC: 3.8 MMOL/L — SIGNIFICANT CHANGE UP (ref 3.5–5.3)
POTASSIUM SERPL-SCNC: 3.8 MMOL/L — SIGNIFICANT CHANGE UP (ref 3.5–5.3)
POTASSIUM SERPL-SCNC: 3.8 MMOL/L — SIGNIFICANT CHANGE UP (ref 3.5–5.3)
PROT SERPL-MCNC: 5.1 G/DL — LOW (ref 6–8.3)
PROT SERPL-MCNC: 5.1 G/DL — LOW (ref 6–8.3)
RBC # BLD: 3.52 M/UL — LOW (ref 3.8–5.2)
RBC # BLD: 3.52 M/UL — LOW (ref 3.8–5.2)
RBC # FLD: 13.4 % — SIGNIFICANT CHANGE UP (ref 10.3–14.5)
RBC # FLD: 13.4 % — SIGNIFICANT CHANGE UP (ref 10.3–14.5)
SODIUM SERPL-SCNC: 142 MMOL/L — SIGNIFICANT CHANGE UP (ref 135–145)
SODIUM SERPL-SCNC: 142 MMOL/L — SIGNIFICANT CHANGE UP (ref 135–145)
WBC # BLD: 5.58 K/UL — SIGNIFICANT CHANGE UP (ref 3.8–10.5)
WBC # BLD: 5.58 K/UL — SIGNIFICANT CHANGE UP (ref 3.8–10.5)
WBC # FLD AUTO: 5.58 K/UL — SIGNIFICANT CHANGE UP (ref 3.8–10.5)
WBC # FLD AUTO: 5.58 K/UL — SIGNIFICANT CHANGE UP (ref 3.8–10.5)

## 2023-10-23 RX ADMIN — CEFTRIAXONE 100 MILLIGRAM(S): 500 INJECTION, POWDER, FOR SOLUTION INTRAMUSCULAR; INTRAVENOUS at 19:56

## 2023-10-23 RX ADMIN — ENOXAPARIN SODIUM 40 MILLIGRAM(S): 100 INJECTION SUBCUTANEOUS at 12:05

## 2023-10-23 RX ADMIN — Medication 100 MICROGRAM(S): at 05:29

## 2023-10-23 NOTE — PROGRESS NOTE ADULT - ASSESSMENT
_________________________________________________________________________________________  ========>>  M E D I C A L   A T T E N D I N G    F O L L O W  U P  N O T E  <<=========  -----------------------------------------------------------------------------------------------------    - Patient seen and examined by me earlier today.   - In summary,  RONALD GASPAR is a 78y year old woman admitted with urosepsis  - Patient today overall doing ok, comfortable, eating OK.  overall otherwise doing better     ==================>> REVIEW OF SYSTEM <<=================    GEN: felt feverish before , no chills, no pain, no flank pain   RESP: no SOB, no cough, no sputum  CVS: no chest pain, no palpitations  GI: no abdominal pain, no nausea, no constipation, no diarrhea  : no dysuria, no frequency, no blood   Neuro: no headache, no dizziness    ==================>> PHYSICAL EXAM <<=================    GEN: A&O X 3 , NAD , comfortable, pleasant, calm   HEENT: NCAT, PERRL, MMM, hearing intact  CVS: S1S2 , regular , No M/R/G appreciated  PULM: CTA B/L,  no W/R/R appreciated  ABD.: soft. non tender, non distended,  bowel sounds present  Extrem: intact pulses , no edema           ( Note written / Date of service 10-23-23 ( This is certified to be the same as "ENTERED" date above ( for billing purposes)))    ==================>> MEDICATIONS <<====================    MEDICATIONS  (STANDING):  cefTRIAXone   IVPB 2000 milliGRAM(s) IV Intermittent every 24 hours  enoxaparin Injectable 40 milliGRAM(s) SubCutaneous every 24 hours  levothyroxine 100 MICROGram(s) Oral daily    MEDICATIONS  (PRN):  acetaminophen     Tablet .. 650 milliGRAM(s) Oral every 6 hours PRN Temp greater or equal to 38C (100.4F), Mild Pain (1 - 3)  aluminum hydroxide/magnesium hydroxide/simethicone Suspension 30 milliLiter(s) Oral every 4 hours PRN Dyspepsia  melatonin 3 milliGRAM(s) Oral at bedtime PRN Insomnia  ondansetron Injectable 4 milliGRAM(s) IV Push every 8 hours PRN Nausea and/or Vomiting    ___________  Active diet:  Diet, Regular:   DASH/TLC Sodium & Cholesterol Restricted (DASH)  ___________________    ==================>> VITAL SIGNS <<==================    T(C): 36.7 (10-23-23 @ 09:55), Max: 38.2 (10-22-23 @ 15:49)  HR: 59 (10-23-23 @ 09:55) (59 - 108)  BP: 115/69 (10-23-23 @ 09:55) (89/55 - 131/45)  RR: 18 (10-23-23 @ 09:55) (18 - 20)  SpO2: 100% (10-23-23 @ 09:55) (96% - 100%)     I&O's Summary    23 Oct 2023 07:01  -  23 Oct 2023 13:55  --------------------------------------------------------  IN: 0 mL / OUT: 300 mL / NET: -300 mL     ==================>> LAB AND IMAGING <<==================                        10.6   5.58  )-----------( 148      ( 23 Oct 2023 05:33 )             31.4        10-23    142  |  112<H>  |  16  ----------------------------<  107<H>  3.8   |  24  |  0.75    Ca    7.9<L>      23 Oct 2023 05:33  Phos  2.8     10-23  Mg     2.00     10-23    TPro  5.1<L>  /  Alb  3.1<L>  /  TBili  <0.2  /  DBili  x   /  AST  39<H>  /  ALT  29  /  AlkPhos  68  10-23    PT/INR - ( 22 Oct 2023 16:53 )   PT: 14.6 sec;   INR: 1.31 ratio    PTT - ( 22 Oct 2023 16:53 )  PTT:28.6 sec               Urinalysis Basic - ( 23 Oct 2023 05:33 )  Color: x / Appearance: x / SG: x / pH: x  Gluc: 107 mg/dL / Ketone: x  / Bili: x / Urobili: x   Blood: x / Protein: x / Nitrite: x   Leuk Esterase: x / RBC: x / WBC x   Sq Epi: x / Non Sq Epi: x / Bacteria: x    TSH:      0.18   (10-22-23)           HgA1C:   (10-23-23)          (10-23-23)      4.9    ____________________________    M I C R O B I O L O G Y :    cultures in process   SARS-CoV-2: Chiqui (10-22-23 @ 18:03)    ___________________________________________________________________________________  ===============>>  A S S E S S M E N T   A N D   P L A N <<===============  ------------------------------------------------------------------------------------------    · Assessment	  77yo F pmh enlarged aortic aneurysm, HTN, thyroidectomy (2019) s/p thyroid cancer p/w severe sepsis 2/2 pyelonephritis/UTI     Problem/Plan - 1:  ·  Problem: Sepsis due to urinary tract infection.   ·  Plan: -pt p/w fevers, tachy with hypotension c/f severe sepsis 2/2 UTI/pyelo  -c/w CTX 2 g   -f/u urine and blood cultures   -will need abd imaging if any HD instability or worsening leukocytosis.    Problem/Plan - 2:  ·  Problem: Hypothyroidism.   ·  Plan: -TSH suppressed. Will lower levothyroxine for now however should touch base with outpt endocrinologist regarding goal TSH. TSH target is lower in patients with a history of thyroid cancer so current TSH may actually be at target.    Problem/Plan - 3:  ·  Problem: Thrombocytopenia.   ·  Plan: -likely 2/2 sepsis  -trend.  platelets:  148 <==, 140 <==    Problem/Plan - 4:  ·  Problem: Aortic aneurysm.   ·  Plan: -recent imaging showing stable size  -restart amlodipine when BP tolerates.    Problem/Plan - 5:  ·  Problem: Need for prophylactic measure.   ·  Plan: dvt ppx: lovenox.    --------------------------------------------  Case discussed with patient, family at bedside, PMD   Education given on findings and plan of care  ___________________________  H. RANCHO Dodge.  Pager: [phone]

## 2023-10-23 NOTE — PATIENT PROFILE ADULT - CONTRAINDICATIONS & PRECAUTIONS (SELECT ALL THAT APPLY)
Keep Food Log/Diary and Bring to Next Visit  Continue working on dietary principles discussed today  Return for follow-up visit in 6 weeks      
Patient/surrogate refused vaccine...

## 2023-10-24 LAB
ANION GAP SERPL CALC-SCNC: 9 MMOL/L — SIGNIFICANT CHANGE UP (ref 7–14)
ANION GAP SERPL CALC-SCNC: 9 MMOL/L — SIGNIFICANT CHANGE UP (ref 7–14)
BUN SERPL-MCNC: 10 MG/DL — SIGNIFICANT CHANGE UP (ref 7–23)
BUN SERPL-MCNC: 10 MG/DL — SIGNIFICANT CHANGE UP (ref 7–23)
CALCIUM SERPL-MCNC: 8.1 MG/DL — LOW (ref 8.4–10.5)
CALCIUM SERPL-MCNC: 8.1 MG/DL — LOW (ref 8.4–10.5)
CHLORIDE SERPL-SCNC: 110 MMOL/L — HIGH (ref 98–107)
CHLORIDE SERPL-SCNC: 110 MMOL/L — HIGH (ref 98–107)
CO2 SERPL-SCNC: 24 MMOL/L — SIGNIFICANT CHANGE UP (ref 22–31)
CO2 SERPL-SCNC: 24 MMOL/L — SIGNIFICANT CHANGE UP (ref 22–31)
CREAT SERPL-MCNC: 0.63 MG/DL — SIGNIFICANT CHANGE UP (ref 0.5–1.3)
CREAT SERPL-MCNC: 0.63 MG/DL — SIGNIFICANT CHANGE UP (ref 0.5–1.3)
EGFR: 91 ML/MIN/1.73M2 — SIGNIFICANT CHANGE UP
EGFR: 91 ML/MIN/1.73M2 — SIGNIFICANT CHANGE UP
GLUCOSE SERPL-MCNC: 89 MG/DL — SIGNIFICANT CHANGE UP (ref 70–99)
GLUCOSE SERPL-MCNC: 89 MG/DL — SIGNIFICANT CHANGE UP (ref 70–99)
HCT VFR BLD CALC: 33.9 % — LOW (ref 34.5–45)
HCT VFR BLD CALC: 33.9 % — LOW (ref 34.5–45)
HGB BLD-MCNC: 11.3 G/DL — LOW (ref 11.5–15.5)
HGB BLD-MCNC: 11.3 G/DL — LOW (ref 11.5–15.5)
MAGNESIUM SERPL-MCNC: 1.9 MG/DL — SIGNIFICANT CHANGE UP (ref 1.6–2.6)
MAGNESIUM SERPL-MCNC: 1.9 MG/DL — SIGNIFICANT CHANGE UP (ref 1.6–2.6)
MCHC RBC-ENTMCNC: 29.4 PG — SIGNIFICANT CHANGE UP (ref 27–34)
MCHC RBC-ENTMCNC: 29.4 PG — SIGNIFICANT CHANGE UP (ref 27–34)
MCHC RBC-ENTMCNC: 33.3 GM/DL — SIGNIFICANT CHANGE UP (ref 32–36)
MCHC RBC-ENTMCNC: 33.3 GM/DL — SIGNIFICANT CHANGE UP (ref 32–36)
MCV RBC AUTO: 88.3 FL — SIGNIFICANT CHANGE UP (ref 80–100)
MCV RBC AUTO: 88.3 FL — SIGNIFICANT CHANGE UP (ref 80–100)
NRBC # BLD: 0 /100 WBCS — SIGNIFICANT CHANGE UP (ref 0–0)
NRBC # BLD: 0 /100 WBCS — SIGNIFICANT CHANGE UP (ref 0–0)
NRBC # FLD: 0 K/UL — SIGNIFICANT CHANGE UP (ref 0–0)
NRBC # FLD: 0 K/UL — SIGNIFICANT CHANGE UP (ref 0–0)
PHOSPHATE SERPL-MCNC: 3 MG/DL — SIGNIFICANT CHANGE UP (ref 2.5–4.5)
PHOSPHATE SERPL-MCNC: 3 MG/DL — SIGNIFICANT CHANGE UP (ref 2.5–4.5)
PLATELET # BLD AUTO: 147 K/UL — LOW (ref 150–400)
PLATELET # BLD AUTO: 147 K/UL — LOW (ref 150–400)
POTASSIUM SERPL-MCNC: 3.4 MMOL/L — LOW (ref 3.5–5.3)
POTASSIUM SERPL-MCNC: 3.4 MMOL/L — LOW (ref 3.5–5.3)
POTASSIUM SERPL-SCNC: 3.4 MMOL/L — LOW (ref 3.5–5.3)
POTASSIUM SERPL-SCNC: 3.4 MMOL/L — LOW (ref 3.5–5.3)
RBC # BLD: 3.84 M/UL — SIGNIFICANT CHANGE UP (ref 3.8–5.2)
RBC # BLD: 3.84 M/UL — SIGNIFICANT CHANGE UP (ref 3.8–5.2)
RBC # FLD: 13.3 % — SIGNIFICANT CHANGE UP (ref 10.3–14.5)
RBC # FLD: 13.3 % — SIGNIFICANT CHANGE UP (ref 10.3–14.5)
SODIUM SERPL-SCNC: 143 MMOL/L — SIGNIFICANT CHANGE UP (ref 135–145)
SODIUM SERPL-SCNC: 143 MMOL/L — SIGNIFICANT CHANGE UP (ref 135–145)
WBC # BLD: 3.86 K/UL — SIGNIFICANT CHANGE UP (ref 3.8–10.5)
WBC # BLD: 3.86 K/UL — SIGNIFICANT CHANGE UP (ref 3.8–10.5)
WBC # FLD AUTO: 3.86 K/UL — SIGNIFICANT CHANGE UP (ref 3.8–10.5)
WBC # FLD AUTO: 3.86 K/UL — SIGNIFICANT CHANGE UP (ref 3.8–10.5)

## 2023-10-24 PROCEDURE — 99222 1ST HOSP IP/OBS MODERATE 55: CPT | Mod: GC

## 2023-10-24 RX ORDER — LACTOBACILLUS ACIDOPHILUS 100MM CELL
1 CAPSULE ORAL
Refills: 0 | Status: DISCONTINUED | OUTPATIENT
Start: 2023-10-24 | End: 2023-10-26

## 2023-10-24 RX ORDER — POTASSIUM CHLORIDE 20 MEQ
20 PACKET (EA) ORAL ONCE
Refills: 0 | Status: COMPLETED | OUTPATIENT
Start: 2023-10-24 | End: 2023-10-24

## 2023-10-24 RX ADMIN — Medication 650 MILLIGRAM(S): at 23:50

## 2023-10-24 RX ADMIN — Medication 650 MILLIGRAM(S): at 22:50

## 2023-10-24 RX ADMIN — Medication 100 MICROGRAM(S): at 06:35

## 2023-10-24 RX ADMIN — ENOXAPARIN SODIUM 40 MILLIGRAM(S): 100 INJECTION SUBCUTANEOUS at 12:21

## 2023-10-24 RX ADMIN — CEFTRIAXONE 100 MILLIGRAM(S): 500 INJECTION, POWDER, FOR SOLUTION INTRAMUSCULAR; INTRAVENOUS at 18:11

## 2023-10-24 RX ADMIN — Medication 20 MILLIEQUIVALENT(S): at 12:21

## 2023-10-24 RX ADMIN — Medication 650 MILLIGRAM(S): at 01:30

## 2023-10-24 NOTE — CONSULT NOTE ADULT - SUBJECTIVE AND OBJECTIVE BOX
CHIEF COMPLAINT: fever    HISTORY OF PRESENT ILLNESS:  79yo F pmh enlarged aortic aneurysm, HTN, thyroidectomy (2019) s/p thyroid cancer p/w fever (tmax 104), chills, cough, nausea, dry heaves. Pt reports feeling unwell for about 2 weeks with new  urinary frequency. Symptoms progressed and developed fevers with intense rigors prompting presentation. She denies chest pain, shortness of breath, rhinorrhea, congestion, abdominal pain, diarrhea, constipation, melena, hematochezia, dysuria, hematuria, flank pain. Reports she was told her thyroid function was abnormal and advised to pause synthroid.       Allergies    penicillin (Unknown)    Intolerances    	    MEDICATIONS:  enoxaparin Injectable 40 milliGRAM(s) SubCutaneous every 24 hours    cefTRIAXone   IVPB 2000 milliGRAM(s) IV Intermittent every 24 hours      acetaminophen     Tablet .. 650 milliGRAM(s) Oral every 6 hours PRN  melatonin 3 milliGRAM(s) Oral at bedtime PRN  ondansetron Injectable 4 milliGRAM(s) IV Push every 8 hours PRN    aluminum hydroxide/magnesium hydroxide/simethicone Suspension 30 milliLiter(s) Oral every 4 hours PRN    levothyroxine 100 MICROGram(s) Oral daily    potassium chloride   Powder 20 milliEquivalent(s) Oral once      PAST MEDICAL & SURGICAL HISTORY:  Adnexal cyst  b/l      Uterine polyp      Fall  s/p fall in  with neck, pain and knee injury      Thyroid nodule      Anemia      Nontoxic multinodular goiter      Amnesia, global, transient  2018- no residuals. Followed by Neuro Dr. German      S/P knee replacement  left       S/P hernia repair  years ago; b/l inguinal      H/O thyroid nodule  s/p excision       S/P cataract extraction  b/l      S/P oophorectomy  right, in her 20s      S/P       S/P unilateral salpingo-oophorectomy  left      S/P colonoscopy with polypectomy          FAMILY HISTORY:  Family history of stroke (Grandparent)    FHx: breast cancer  twin sister and aunt        SOCIAL HISTORY:    non smoker. indep in adl      REVIEW OF SYSTEMS:  See HPI, otherwise complete 10 point review of systems negative    [ ] All others negative	      PHYSICAL EXAM:  T(C): 36.9 (10-24-23 @ 06:35), Max: 36.9 (10-24-23 @ 06:35)  HR: 55 (10-24-23 @ 06:35) (55 - 62)  BP: 119/62 (10-24-23 @ 06:35) (110/51 - 137/81)  RR: 18 (10-24-23 @ 06:35) (16 - 18)  SpO2: 100% (10-24-23 @ 06:35) (100% - 100%)  Wt(kg): --  I&O's Summary    23 Oct 2023 07:01  -  24 Oct 2023 07:00  --------------------------------------------------------  IN: 850 mL / OUT: 1500 mL / NET: -650 mL        Appearance: No Acute Distress	  HEENT:  Normal oral mucosa, PERRL, EOMI	  Cardiovascular: Normal S1 S2, No JVD, No murmurs/rubs/gallops  Respiratory: Lungs clear to auscultation bilaterally  Gastrointestinal:  Soft, Non-tender, + BS	  Skin: No rashes, No ecchymoses, No cyanosis	  Neurologic: Non-focal  Extremities: No clubbing, cyanosis or edema  Vascular: Peripheral pulses palpable 2+ bilaterally  Psychiatry: A & O x 3, Mood & affect appropriate    Laboratory Data:	 	    CBC Full  -  ( 24 Oct 2023 05:27 )  WBC Count : 3.86 K/uL  Hemoglobin : 11.3 g/dL  Hematocrit : 33.9 %  Platelet Count - Automated : 147 K/uL  Mean Cell Volume : 88.3 fL  Mean Cell Hemoglobin : 29.4 pg  Mean Cell Hemoglobin Concentration : 33.3 gm/dL  Auto Neutrophil # : x  Auto Lymphocyte # : x  Auto Monocyte # : x  Auto Eosinophil # : x  Auto Basophil # : x  Auto Neutrophil % : x  Auto Lymphocyte % : x  Auto Monocyte % : x  Auto Eosinophil % : x  Auto Basophil % : x    10-24    143  |  110<H>  |  10  ----------------------------<  89  3.4<L>   |  24  |  0.63  10-23    142  |  112<H>  |  16  ----------------------------<  107<H>  3.8   |  24  |  0.75    Ca    8.1<L>      24 Oct 2023 05:27  Ca    7.9<L>      23 Oct 2023 05:33  Phos  3.0     10-24  Phos  2.8     10-  Mg     1.90     10-24  Mg     2.00     10-23    TPro  5.1<L>  /  Alb  3.1<L>  /  TBili  <0.2  /  DBili  x   /  AST  39<H>  /  ALT  29  /  AlkPhos  68  10-  TPro  6.1  /  Alb  3.7  /  TBili  0.4  /  DBili  x   /  AST  44<H>  /  ALT  26  /  AlkPhos  87  10      proBNP:   Lipid Profile:   HgA1c:   TSH:       CARDIAC MARKERS:            Interpretation of Telemetry: 	    ECG:  	  RADIOLOGY:  OTHER: 	    PREVIOUS DIAGNOSTIC TESTING:    [ ] Echocardiogram:  [ ] Catheterization:  [ ] Stress Test:  	    Assessment:  79yo F pmh enlarged aortic aneurysm, HTN, thyroidectomy (2019) s/p thyroid cancer p/w fever (tmax 104), chills, cough, nausea, dry heaves found to have gram negative jessica bacteremia    Recs:  cardiac stable  denies any ischemic or hf sx  abx per primary team  ID consult  low concern for IE at present   bp stable off antihypertensives  will follow          Greater than 60 minutes spent on total encounter; more than 50% of the visit was spent counseling and/or coordinating care by the attending physician.   	  Braeden Tatum MD   Cardiovascular Diseases  [phone]

## 2023-10-24 NOTE — CONSULT NOTE ADULT - SUBJECTIVE AND OBJECTIVE BOX
Patient is a 78y old  Female who presents with a chief complaint of fevers (24 Oct 2023 07:49)    HPI:  78F with enlarged aortic aneurysm, HTN,  thyroid cancer s/p thyroidectomy () presents with fever, chills, nausea and dry heaves, found to be febrile 100.7F, without leukocytosis, UA grossly positive with UCX Ecoli, BCx GNR PCR for Ecoli positive, ID consulted for assistance.    Patient --       prior hospital charts reviewed [  ]  primary team notes reviewed [  ]  other consultant notes reviewed [  ]    PAST MEDICAL & SURGICAL HISTORY:  Adnexal cyst  b/l      Uterine polyp      Fall  s/p fall in  with neck, pain and knee injury      Thyroid nodule      Anemia      Nontoxic multinodular goiter      Amnesia, global, transient  2018- no residuals. Followed by Neuro Dr. German      S/P knee replacement  left       S/P hernia repair  years ago; b/l inguinal      H/O thyroid nodule  s/p excision       S/P cataract extraction  b/l      S/P oophorectomy  right, in her 20s      S/P       S/P unilateral salpingo-oophorectomy  left      S/P colonoscopy with polypectomy          Allergies  penicillin (Unknown)    ANTIMICROBIALS (past 90 days)  MEDICATIONS  (STANDING):  cefTRIAXone   IVPB   100 mL/Hr IV Intermittent (10-22-23 @ 18:44)    cefTRIAXone   IVPB   100 mL/Hr IV Intermittent (10-23-23 @ 19:56)        cefTRIAXone   IVPB 2000 every 24 hours    MEDICATIONS  (STANDING):  acetaminophen     Tablet .. 650 every 6 hours PRN  aluminum hydroxide/magnesium hydroxide/simethicone Suspension 30 every 4 hours PRN  enoxaparin Injectable 40 every 24 hours  levothyroxine 100 daily  melatonin 3 at bedtime PRN  ondansetron Injectable 4 every 8 hours PRN    SOCIAL HISTORY:       FAMILY HISTORY:  Family history of stroke (Grandparent)    FHx: breast cancer  twin sister and aunt      REVIEW OF SYSTEMS  [  ] ROS unobtainable because:    [  ] All other systems negative except as noted below:	    Constitutional:  [ ] fever [ ] chills  [ ] weight loss  [ ] weakness  Skin:  [ ] rash [ ] phlebitis	  Eyes: [ ] icterus [ ] pain  [ ] discharge	  ENMT: [ ] sore throat  [ ] thrush [ ] ulcers [ ] exudates  Respiratory: [ ] dyspnea [ ] hemoptysis [ ] cough [ ] sputum	  Cardiovascular:  [ ] chest pain [ ] palpitations [ ] edema	  Gastrointestinal:  [ ] nausea [ ] vomiting [ ] diarrhea [ ] constipation [ ] pain	  Genitourinary:  [ ] dysuria [ ] frequency [ ] hematuria [ ] discharge [ ] flank pain  [ ] incontinence  Musculoskeletal:  [ ] myalgias [ ] arthralgias [ ] arthritis  [ ] back pain  Neurological:  [ ] headache [ ] seizures  [ ] confusion/altered mental status  Psychiatric:  [ ] anxiety [ ] depression	  Hematology/Lymphatics:  [ ] lymphadenopathy  Endocrine:  [ ] adrenal [ ] thyroid  Allergic/Immunologic:	 [ ] transplant [ ] seasonal    Vital Signs Last 24 Hrs  T(F): 98.5 (10-24-23 @ 06:35), Max: 100.7 (10-22-23 @ 15:49)  Vital Signs Last 24 Hrs  HR: 55 (10-24-23 @ 06:35) (55 - 62)  BP: 119/62 (10-24-23 @ 06:35) (110/51 - 137/81)  RR: 18 (10-24-23 @ 06:35)  SpO2: 100% (10-24-23 @ 06:35) (100% - 100%)  Wt(kg): --    PHYSICAL EXAM:                              11.3   3.86  )-----------( 147      ( 24 Oct 2023 05:27 )             33.9   10-    143  |  110<H>  |  10  ----------------------------<  89  3.4<L>   |  24  |  0.63    Ca    8.1<L>      24 Oct 2023 05:27  Phos  3.0     10  Mg     1.90     10-24    TPro  5.1<L>  /  Alb  3.1<L>  /  TBili  <0.2  /  DBili  x   /  AST  39<H>  /  ALT  29  /  AlkPhos  68  10-23    Urinalysis Basic - ( 24 Oct 2023 05:27 )    Color: x / Appearance: x / SG: x / pH: x  Gluc: 89 mg/dL / Ketone: x  / Bili: x / Urobili: x   Blood: x / Protein: x / Nitrite: x   Leuk Esterase: x / RBC: x / WBC x   Sq Epi: x / Non Sq Epi: x / Bacteria: x    MICROBIOLOGY:  Culture - Urine (collected 22 Oct 2023 17:03)  Source: Clean Catch Clean Catch (Midstream)  Preliminary Report (23 Oct 2023 20:28):    >100,000 CFU/ml Escherichia coli    Culture - Blood (collected 22 Oct 2023 16:55)  Source: .Blood Blood-Peripheral  Preliminary Report (23 Oct 2023 19:01):    No growth at 24 hours    Culture - Blood (collected 22 Oct 2023 16:49)  Source: .Blood Blood-Peripheral  Gram Stain (23 Oct 2023 20:44):    Growth in aerobic bottle: Gram Negative Rods  Preliminary Report (23 Oct 2023 20:45):    Growth in aerobic bottle: Gram Negative Rods    Direct identification is available within approximately 3-5    hours either by Blood Panel Multiplexed PCR or Direct    MALDI-TOF. Details: [email or web address]  Organism: Blood Culture PCR (23 Oct 2023 23:34)  Organism: Blood Culture PCR (23 Oct 2023 23:34)      Method Type: PCR      -  Escherichia coli: Detec              Rapid RVP Result: NotDetec (10-22 @ 18:03)      RADIOLOGY:  imaging below personally reviewed and agree with findings  < from: Xray Chest 1 View- PORTABLE-Urgent (10.22.23 @ 19:05) >  IMPRESSION:  No focal consolidation.    < end of copied text >   Patient is a 78y old  Female who presents with a chief complaint of fevers (24 Oct 2023 07:49)    HPI:  78F with enlarged aortic aneurysm, HTN,  thyroid cancer s/p thyroidectomy () presents with fever, chills, nausea and dry heaves, found to be febrile 100.7F, without leukocytosis, UA grossly positive with UCX Ecoli, BCx GNR PCR for Ecoli positive, ID consulted for assistance.    Reports urinary frequency 1 week prior, but no dysuria or flank pain  Chills and fever have improved since admission  Denies any history of frequent UTI  No recent hospitalization  No recent travel or sick contact  PCN allergy as a child, apparently faints with PCN, but no rash, hives or anaphylactic reaction      prior hospital charts reviewed [x]  primary team notes reviewed [x  ]  other consultant notes reviewed [ x ]    PAST MEDICAL & SURGICAL HISTORY:  Adnexal cyst  b/l      Uterine polyp      Fall  s/p fall in  with neck, pain and knee injury      Thyroid nodule      Anemia      Nontoxic multinodular goiter      Amnesia, global, transient  2018- no residuals. Followed by Neuro Dr. German      S/P knee replacement  left       S/P hernia repair  years ago; b/l inguinal      H/O thyroid nodule  s/p excision       S/P cataract extraction  b/l      S/P oophorectomy  right, in her 20s      S/P       S/P unilateral salpingo-oophorectomy  left      S/P colonoscopy with polypectomy          Allergies  penicillin (Unknown)    ANTIMICROBIALS (past 90 days)  MEDICATIONS  (STANDING):  cefTRIAXone   IVPB   100 mL/Hr IV Intermittent (10-22-23 @ 18:44)    cefTRIAXone   IVPB   100 mL/Hr IV Intermittent (10-23-23 @ 19:56)        cefTRIAXone   IVPB 2000 every 24 hours    MEDICATIONS  (STANDING):  acetaminophen     Tablet .. 650 every 6 hours PRN  aluminum hydroxide/magnesium hydroxide/simethicone Suspension 30 every 4 hours PRN  enoxaparin Injectable 40 every 24 hours  levothyroxine 100 daily  melatonin 3 at bedtime PRN  ondansetron Injectable 4 every 8 hours PRN    SOCIAL HISTORY:   Denies alcohol, tobacco, recreational drug use      FAMILY HISTORY:  Family history of stroke (Grandparent)    FHx: breast cancer  twin sister and aunt      REVIEW OF SYSTEMS  [  ] ROS unobtainable because:    [x  ] All other systems negative except as noted below:	    Constitutional:  [ ] fever [ ] chills  [ ] weight loss  [ ] weakness  Skin:  [ ] rash [ ] phlebitis	  Eyes: [ ] icterus [ ] pain  [ ] discharge	  ENMT: [ ] sore throat  [ ] thrush [ ] ulcers [ ] exudates  Respiratory: [ ] dyspnea [ ] hemoptysis [ ] cough [ ] sputum	  Cardiovascular:  [ ] chest pain [ ] palpitations [ ] edema	  Gastrointestinal:  [ ] nausea [ ] vomiting [ ] diarrhea [ ] constipation [ ] pain	  Genitourinary:  [ ] dysuria [ ] frequency [ ] hematuria [ ] discharge [ ] flank pain  [ ] incontinence  Musculoskeletal:  [ ] myalgias [ ] arthralgias [ ] arthritis  [ ] back pain  Neurological:  [ ] headache [ ] seizures  [ ] confusion/altered mental status  Psychiatric:  [ ] anxiety [ ] depression	  Hematology/Lymphatics:  [ ] lymphadenopathy  Endocrine:  [ ] adrenal [ ] thyroid  Allergic/Immunologic:	 [ ] transplant [ ] seasonal    Vital Signs Last 24 Hrs  T(F): 98.5 (10-24-23 @ 06:35), Max: 100.7 (10-22-23 @ 15:49)  Vital Signs Last 24 Hrs  HR: 55 (10-24-23 @ 06:35) (55 - 62)  BP: 119/62 (10-24-23 @ 06:35) (110/51 - 137/81)  RR: 18 (10-24-23 @ 06:35)  SpO2: 100% (10-24-23 @ 06:35) (100% - 100%)  Wt(kg): --    Physical Exam:  Constitutional:  well preserved, comfortable  Head/Eyes: no icterus  ENT:  supple, no cervical lymphadenopathy   LUNGS:  CTA  CVS:  regular rhythm, no murmur  Abd:  soft, non-tender; non-distended  Ext:  no edema  Vascular:  IV site no erythema tenderness or discharge  MSK:  joints without swelling  Neuro: AAO X 3, non- focal                                11.3   3.86  )-----------( 147      ( 24 Oct 2023 05:27 )             33.9   10-    143  |  110<H>  |  10  ----------------------------<  89  3.4<L>   |  24  |  0.63    Ca    8.1<L>      24 Oct 2023 05:27  Phos  3.0     10-24  Mg     1.90     10-24    TPro  5.1<L>  /  Alb  3.1<L>  /  TBili  <0.2  /  DBili  x   /  AST  39<H>  /  ALT  29  /  AlkPhos  68  10-23    Urinalysis Basic - ( 24 Oct 2023 05:27 )    Color: x / Appearance: x / SG: x / pH: x  Gluc: 89 mg/dL / Ketone: x  / Bili: x / Urobili: x   Blood: x / Protein: x / Nitrite: x   Leuk Esterase: x / RBC: x / WBC x   Sq Epi: x / Non Sq Epi: x / Bacteria: x    MICROBIOLOGY:  Culture - Urine (collected 22 Oct 2023 17:03)  Source: Clean Catch Clean Catch (Midstream)  Preliminary Report (23 Oct 2023 20:28):    >100,000 CFU/ml Escherichia coli    Culture - Blood (collected 22 Oct 2023 16:55)  Source: .Blood Blood-Peripheral  Preliminary Report (23 Oct 2023 19:01):    No growth at 24 hours    Culture - Blood (collected 22 Oct 2023 16:49)  Source: .Blood Blood-Peripheral  Gram Stain (23 Oct 2023 20:44):    Growth in aerobic bottle: Gram Negative Rods  Preliminary Report (23 Oct 2023 20:45):    Growth in aerobic bottle: Gram Negative Rods    Direct identification is available within approximately 3-5    hours either by Blood Panel Multiplexed PCR or Direct    MALDI-TOF. Details: [email or web address]  Organism: Blood Culture PCR (23 Oct 2023 23:34)  Organism: Blood Culture PCR (23 Oct 2023 23:34)      Method Type: PCR      -  Escherichia coli: Detec              Rapid RVP Result: NotDetec (10-22 @ 18:03)      RADIOLOGY:  imaging below personally reviewed and agree with findings  < from: Xray Chest 1 View- PORTABLE-Urgent (10.22.23 @ 19:05) >  IMPRESSION:  No focal consolidation.    < end of copied text >

## 2023-10-24 NOTE — PROGRESS NOTE ADULT - ASSESSMENT
_________________________________________________________________________________________  ========>>  M E D I C A L   A T T E N D I N G    F O L L O W  U P  N O T E  <<=========  -----------------------------------------------------------------------------------------------------    - Patient seen and examined by me earlier today.   - In summary,  RONALD GASPAR is a 78y year old woman admitted with urosepsis  - Patient today overall doing ok, comfortable, eating OK.  overall otherwise doing better     ==================>> REVIEW OF SYSTEM <<=================    GEN: felt feverish before , no chills, no pain, no flank pain   RESP: no SOB, no cough, no sputum  CVS: no chest pain, no palpitations  GI: no abdominal pain, no nausea, no constipation, no diarrhea  : no dysuria, no frequency, no blood   Neuro: no headache, no dizziness    ==================>> PHYSICAL EXAM <<=================    GEN: A&O X 3 , NAD , comfortable, pleasant, calm   HEENT: NCAT, PERRL, MMM, hearing intact  CVS: S1S2 , regular , No M/R/G appreciated  PULM: CTA B/L,  no W/R/R appreciated  ABD.: soft. non tender, non distended,  bowel sounds present  Extrem: intact pulses , no edema        ( Note written / Date of service 10-24-23 ( This is certified to be the same as "ENTERED" date above ( for billing purposes)))    ==================>> MEDICATIONS <<====================    cefTRIAXone   IVPB 2000 milliGRAM(s) IV Intermittent every 24 hours  enoxaparin Injectable 40 milliGRAM(s) SubCutaneous every 24 hours  levothyroxine 100 MICROGram(s) Oral daily    MEDICATIONS  (PRN):  acetaminophen     Tablet .. 650 milliGRAM(s) Oral every 6 hours PRN Temp greater or equal to 38C (100.4F), Mild Pain (1 - 3)  aluminum hydroxide/magnesium hydroxide/simethicone Suspension 30 milliLiter(s) Oral every 4 hours PRN Dyspepsia  melatonin 3 milliGRAM(s) Oral at bedtime PRN Insomnia  ondansetron Injectable 4 milliGRAM(s) IV Push every 8 hours PRN Nausea and/or Vomiting    ___________  Active diet:  Diet, Regular:   DASH/TLC Sodium & Cholesterol Restricted (DASH)  ___________________    ==================>> VITAL SIGNS <<==================  Height (cm): 162.6  Weight (kg): 52.435  BMI (kg/m2): 19.8  Vital Signs Last 24 HrsT(C): 36.9 (10-24-23 @ 15:17)  T(F): 98.4 (10-24-23 @ 15:17), Max: 98.5 (10-24-23 @ 06:35)  HR: 64 (10-24-23 @ 15:17) (55 - 64)  BP: 120/57 (10-24-23 @ 15:17)  RR: 17 (10-24-23 @ 15:17) (16 - 18)  SpO2: 100% (10-24-23 @ 15:17) (98% - 100%)      CAPILLARY BLOOD GLUCOSE         ==================>> LAB AND IMAGING <<==================                        11.3   3.86  )-----------( 147      ( 24 Oct 2023 05:27 )             33.9        10-24    143  |  110<H>  |  10  ----------------------------<  89  3.4<L>   |  24  |  0.63    Ca    8.1<L>      24 Oct 2023 05:27  Phos  3.0     10-24  Mg     1.90     10-24    TPro  5.1<L>  /  Alb  3.1<L>  /  TBili  <0.2  /  DBili  x   /  AST  39<H>  /  ALT  29  /  AlkPhos  68  10-23    WBC count:   3.86 <<== ,  5.58 <<== ,  5.13 <<==   Hemoglobin:   11.3 <<==,  10.6 <<==,  11.7 <<==  platelets:  147 <==, 148 <==, 140 <==    Creatinine:  0.63  <<==, 0.75  <<==, 0.75  <<==  Sodium:   143  <==, 142  <==, 142  <==       AST:          39 <== , 44 <==      ALT:        29  <== , 26  <==      AP:        68  <=, 87  <=     Bili:        <0.2  <=, 0.4  <=    ____________________________    M I C R O B I O L O G Y :    Culture - Urine (collected 22 Oct 2023 17:03)  Source: Clean Catch Clean Catch (Midstream)  Preliminary Report (23 Oct 2023 20:28):    >100,000 CFU/ml Escherichia coli    Culture - Blood (collected 22 Oct 2023 16:55)  Source: .Blood Blood-Peripheral  Preliminary Report (23 Oct 2023 19:01):    No growth at 24 hours    Culture - Blood (collected 22 Oct 2023 16:49)  Source: .Blood Blood-Peripheral  Gram Stain (23 Oct 2023 20:44):    Growth in aerobic bottle: Gram Negative Rods  Preliminary Report (24 Oct 2023 13:35):    Growth in aerobic bottle: Escherichia coli    Direct identification is available within approximately 3-5    hours either by Blood Panel Multiplexed PCR or Direct    MALDI-TOF. Details: [email or web address]  Organism: Blood Culture PCR (23 Oct 2023 23:34)  Organism: Blood Culture PCR (23 Oct 2023 23:34)    Sensitivities:      -  Escherichia coli: Detec      Method Type: PCR        SARS-CoV-2: NotDetec (10-22-23 @ 18:03)      ___________________________________________________________________________________  ===============>>  A S S E S S M E N T   A N D   P L A N <<===============  ------------------------------------------------------------------------------------------    · Assessment	  77yo F pmh enlarged aortic aneurysm, HTN, thyroidectomy (2019) s/p thyroid cancer p/w severe sepsis 2/2 pyelonephritis/UTI     Problem/Plan - 1:  ·  Problem: Sepsis due to urinary tract infection.   ·  Plan: -pt p/w fevers, tachy with hypotension c/f severe sepsis 2/2 UTI/pyelo  -c/w CTX 2 g   -f/u urine and blood cultures   -will need abd imaging if any HD instability or worsening leukocytosis.    Problem/Plan - 2:  ·  Problem: Hypothyroidism.   ·  Plan: -TSH suppressed. Will lower levothyroxine for now however should touch base with outpt endocrinologist regarding goal TSH. TSH target is lower in patients with a history of thyroid cancer so current TSH may actually be at target.    Problem/Plan - 3:  ·  Problem: Thrombocytopenia.   ·  Plan: -likely 2/2 sepsis  -trend.  platelets:  148 <==, 140 <==    Problem/Plan - 4:  ·  Problem: Aortic aneurysm.   ·  Plan: -recent imaging showing stable size  -restart amlodipine when BP tolerates.    Problem/Plan - 5:  ·  Problem: Need for prophylactic measure.   ·  Plan: dvt ppx: lovenox.    --------------------------------------------  Case discussed with patient, family at bedside, PMD   Education given on findings and plan of care  ___________________________  H. RANCHO Dodge.  Pager: [phone]       _________________________________________________________________________________________  ========>>  M E D I C A L   A T T E N D I N G    F O L L O W  U P  N O T E  <<=========  -----------------------------------------------------------------------------------------------------    - Patient seen and examined by me earlier today.   - Patient today overall doing better .. comfortable, eating better, in chair, overall otherwise doing better , no fever / chills     Patient's blood cultures are positive as below    ==================>> REVIEW OF SYSTEM <<=================    GEN: felt feverish before , no chills, no pain, no flank pain   RESP: no SOB, no cough, no sputum  CVS: no chest pain, no palpitations  GI:   My abdominal discomfort, no nausea,    Had one episode of diarrhea Last night  : no dysuria, no frequency, no blood   Neuro: no headache, no dizziness    ==================>> PHYSICAL EXAM <<=================    GEN: A&O X 3 , NAD , comfortable, pleasant, calm , In chair  HEENT: NCAT, PERRL, MMM, hearing intact  CVS: S1S2 , regular , No M/R/G appreciated  PULM: CTA B/L,  no W/R/R appreciated  ABD.: soft. non tender, non distended,  bowel sounds present  Extrem: intact pulses , no edema        ( Note written / Date of service 10-24-23 ( This is certified to be the same as "ENTERED" date above ( for billing purposes)))    ==================>> MEDICATIONS <<====================    cefTRIAXone   IVPB 2000 milliGRAM(s) IV Intermittent every 24 hours  enoxaparin Injectable 40 milliGRAM(s) SubCutaneous every 24 hours  levothyroxine 100 MICROGram(s) Oral daily    MEDICATIONS  (PRN):  acetaminophen     Tablet .. 650 milliGRAM(s) Oral every 6 hours PRN Temp greater or equal to 38C (100.4F), Mild Pain (1 - 3)  aluminum hydroxide/magnesium hydroxide/simethicone Suspension 30 milliLiter(s) Oral every 4 hours PRN Dyspepsia  melatonin 3 milliGRAM(s) Oral at bedtime PRN Insomnia  ondansetron Injectable 4 milliGRAM(s) IV Push every 8 hours PRN Nausea and/or Vomiting    ___________  Active diet:  Diet, Regular:   DASH/TLC Sodium & Cholesterol Restricted (DASH)  ___________________    ==================>> VITAL SIGNS <<==================  Height (cm): 162.6  Weight (kg): 52.435  BMI (kg/m2): 19.8  Vital Signs Last 24 HrsT(C): 36.9 (10-24-23 @ 15:17)  T(F): 98.4 (10-24-23 @ 15:17), Max: 98.5 (10-24-23 @ 06:35)  HR: 64 (10-24-23 @ 15:17) (55 - 64)  BP: 120/57 (10-24-23 @ 15:17)  RR: 17 (10-24-23 @ 15:17) (16 - 18)  SpO2: 100% (10-24-23 @ 15:17) (98% - 100%)       ==================>> LAB AND IMAGING <<==================                        11.3   3.86  )-----------( 147      ( 24 Oct 2023 05:27 )             33.9        10-24    143  |  110<H>  |  10  ----------------------------<  89  3.4<L>   |  24  |  0.63    Ca    8.1<L>      24 Oct 2023 05:27  Phos  3.0     10-24  Mg     1.90     10-24    TPro  5.1<L>  /  Alb  3.1<L>  /  TBili  <0.2  /  DBili  x   /  AST  39<H>  /  ALT  29  /  AlkPhos  68  10-23    WBC count:   3.86 <<== ,  5.58 <<== ,  5.13 <<==   Hemoglobin:   11.3 <<==,  10.6 <<==,  11.7 <<==  platelets:  147 <==, 148 <==, 140 <==    Creatinine:  0.63  <<==, 0.75  <<==, 0.75  <<==  Sodium:   143  <==, 142  <==, 142  <==       AST:          39 <== , 44 <==      ALT:        29  <== , 26  <==      AP:        68  <=, 87  <=     Bili:        <0.2  <=, 0.4  <=    ____________________________    M I C R O B I O L O G Y :    Culture - Urine (collected 22 Oct 2023 17:03)  Source: Clean Catch Clean Catch (Midstream)  Preliminary Report (23 Oct 2023 20:28):    >100,000 CFU/ml Escherichia coli    Culture - Blood (collected 22 Oct 2023 16:55)  Source: .Blood Blood-Peripheral  Preliminary Report (23 Oct 2023 19:01):    No growth at 24 hours    Culture - Blood (collected 22 Oct 2023 16:49)  Source: .Blood Blood-Peripheral  Gram Stain (23 Oct 2023 20:44):    Growth in aerobic bottle: Gram Negative Rods  Preliminary Report (24 Oct 2023 13:35):    Growth in aerobic bottle: Escherichia coli    Direct identification is available within approximately 3-5    hours either by Blood Panel Multiplexed PCR or Direct    MALDI-TOF. Details: [email or web address]  Organism: Blood Culture PCR (23 Oct 2023 23:34)  Organism: Blood Culture PCR (23 Oct 2023 23:34)    Sensitivities:      -  Escherichia coli: Detec      Method Type: PCR    SARS-CoV-2: NotDetec (10-22-23 @ 18:03)    ___________________________________________________________________________________  ===============>>  A S S E S S M E N T   A N D   P L A N <<===============  ------------------------------------------------------------------------------------------    · Assessment	  77yo F pmh enlarged aortic aneurysm, HTN, thyroidectomy (2019) s/p thyroid cancer p/w severe sepsis 2/2 pyelonephritis/UTI     Problem/Plan - 1:  ·  Problem: Sepsis due to urinary tract infection / Pyelonephritis, gram-negative bacteremia.   - Infectious disease consultant, appreciated    continue IV antibiotics as per ID    probiotics given diarrhea  -f/u urine and blood cultures  / Sensitivities    Problem/Plan - 2:  ·  Problem: Hypothyroidism.   ·  Plan: -TSH suppressed. Will lower levothyroxine for now however should touch base with outpt endocrinologist regarding goal TSH. TSH target is lower in patients with a history of thyroid cancer so current TSH may actually be at target.    Problem/Plan - 3:  ·  Problem: Thrombocytopenia.   ·  Plan: -likely 2/2 sepsis  -trend.  platelets:  148 <==, 140 <==    Problem/Plan - 4:  ·  Problem: Aortic aneurysm.   ·  Plan: -recent imaging showing stable size  -restart amlodipine when BP tolerates.  - Resume home blood pressure medications as able and monitor    Problem/Plan - 5:  ·  Problem: Need for prophylactic measure.   ·  Plan: dvt ppx: lovenox.    --------------------------------------------  Case discussed with patient, family at bedside, ACP   Education given on findings and plan of care  ___________________________  H. RANCHO Dodge.  Pager: [phone]

## 2023-10-24 NOTE — CONSULT NOTE ADULT - ATTENDING COMMENTS
This is a 78F with PMHx of enlarged aortic aneurysm, HTN,  thyroid cancer s/p thyroidectomy (2019) presents with fever, chills, nausea. In the ED, found to be febrile 100.7F, without leukocytosis, UA with significant pyuria, UCx with Ecoli, BCx 1/4 GNR PCR for E coli positive.    #E Coli bacteremia   #UTI, possible pyelonephritis   #Fever   #PCN allergy     Overall 78 F presenting with sepsis 2/2 E Coli bacteremia likely from UTI/pyelo. Pt improved on Ceftriaxone, now afebrile. No CVA tenderness on exam. Will follow up sensitivities and plan on 7-10 treatment.     Plan:  1. C/w Ceftriaxone 2g q24 for now  2. Follow up sensitivities     Thank you for this consult. Inpatient ID team will follow.    Doug Cartwright M.D.  Attending Physician  Division of Infectious Diseases  Department of Medicine    Please contact through MS Teams message.  Office: [phone] (after 5 PM or weekend)

## 2023-10-24 NOTE — CONSULT NOTE ADULT - ASSESSMENT
WORK UP:      ANTIBIOTIC:      DIAGNOSIS and IMPRESSION:        RECOMMENDATIONS:        Above recommendations are Preliminary until Attending's Addendum which includes Final Recommendations      Prosper Alas DO, PGY-5   ID fellow  [employer] Preferred  After 5pm/weekends call [phone]   78F with enlarged aortic aneurysm, HTN,  thyroid cancer s/p thyroidectomy (2019) presents with fever, chills, nausea and dry heaves, found to be febrile 100.7F, without leukocytosis, UA grossly positive with UCX Ecoli, BCx GNR PCR for Ecoli positive, ID consulted for assistance.    Reports urinary frequency 1 week prior, but no dysuria or flank pain  Chills and fever have improved since admission  Denies any history of frequent UTI  No recent hospitalization  No recent travel or sick contact  PCN allergy as a child, apparently faints with PCN, but no rash, hives or anaphylactic reaction    DIAGNOSIS and IMPRESSION:  #Ecoli Bacteremia  #Pyelonephritis    - unlikely true PCN allergy, tolerating CTX well    RECOMMENDATIONS:  - continue CTX  - monitor temperature curve  - trend WBC  - obtain US Renal  - follow up identification UCx and BCx  - discuss importance of Allergy testing regarding hx of PCN allergy      Above recommendations are Preliminary until Attending's Addendum which includes Final Recommendations      Prosper Alas DO, PGY-5   ID fellow  [employer] Preferred  After 5pm/weekends call [phone]   78F with enlarged aortic aneurysm, HTN,  thyroid cancer s/p thyroidectomy (2019) presents with fever, chills, nausea and dry heaves, found to be febrile 100.7F, without leukocytosis, UA grossly positive with UCX Ecoli, BCx GNR PCR for Ecoli positive, ID consulted for assistance.    Reports urinary frequency 1 week prior, but no dysuria or flank pain  Chills and fever have improved since admission  Denies any history of frequent UTI  No recent hospitalization  No recent travel or sick contact  PCN allergy as a child, apparently faints with PCN, but no rash, hives or anaphylactic reaction    DIAGNOSIS and IMPRESSION:  #Ecoli Bacteremia  #Pyelonephritis    - unlikely true PCN allergy, tolerating CTX well    RECOMMENDATIONS:  - continue CTX  - monitor temperature curve  - trend WBC  - follow up identification UCx and BCx  - discuss importance of Allergy testing regarding hx of PCN allergy      Case d/w attending and primary team      Prosper Alas DO, PGY-5   ID fellow  [employer] Preferred  After 5pm/weekends call [phone]

## 2023-10-25 LAB
-  AMIKACIN: SIGNIFICANT CHANGE UP
-  AMOXICILLIN/CLAVULANIC ACID: SIGNIFICANT CHANGE UP
-  AMOXICILLIN/CLAVULANIC ACID: SIGNIFICANT CHANGE UP
-  AMPICILLIN/SULBACTAM: SIGNIFICANT CHANGE UP
-  AMPICILLIN: SIGNIFICANT CHANGE UP
-  AZTREONAM: SIGNIFICANT CHANGE UP
-  CEFAZOLIN: SIGNIFICANT CHANGE UP
-  CEFEPIME: SIGNIFICANT CHANGE UP
-  CEFOXITIN: SIGNIFICANT CHANGE UP
-  CEFTRIAXONE: SIGNIFICANT CHANGE UP
-  CEFUROXIME: SIGNIFICANT CHANGE UP
-  CEFUROXIME: SIGNIFICANT CHANGE UP
-  CIPROFLOXACIN: SIGNIFICANT CHANGE UP
-  ERTAPENEM: SIGNIFICANT CHANGE UP
-  GENTAMICIN: SIGNIFICANT CHANGE UP
-  IMIPENEM: SIGNIFICANT CHANGE UP
-  LEVOFLOXACIN: SIGNIFICANT CHANGE UP
-  MEROPENEM: SIGNIFICANT CHANGE UP
-  NITROFURANTOIN: SIGNIFICANT CHANGE UP
-  NITROFURANTOIN: SIGNIFICANT CHANGE UP
-  PIPERACILLIN/TAZOBACTAM: SIGNIFICANT CHANGE UP
-  TOBRAMYCIN: SIGNIFICANT CHANGE UP
-  TRIMETHOPRIM/SULFAMETHOXAZOLE: SIGNIFICANT CHANGE UP
ANION GAP SERPL CALC-SCNC: 11 MMOL/L — SIGNIFICANT CHANGE UP (ref 7–14)
ANION GAP SERPL CALC-SCNC: 11 MMOL/L — SIGNIFICANT CHANGE UP (ref 7–14)
BUN SERPL-MCNC: 16 MG/DL — SIGNIFICANT CHANGE UP (ref 7–23)
BUN SERPL-MCNC: 16 MG/DL — SIGNIFICANT CHANGE UP (ref 7–23)
CALCIUM SERPL-MCNC: 8.3 MG/DL — LOW (ref 8.4–10.5)
CALCIUM SERPL-MCNC: 8.3 MG/DL — LOW (ref 8.4–10.5)
CHLORIDE SERPL-SCNC: 107 MMOL/L — SIGNIFICANT CHANGE UP (ref 98–107)
CHLORIDE SERPL-SCNC: 107 MMOL/L — SIGNIFICANT CHANGE UP (ref 98–107)
CO2 SERPL-SCNC: 25 MMOL/L — SIGNIFICANT CHANGE UP (ref 22–31)
CO2 SERPL-SCNC: 25 MMOL/L — SIGNIFICANT CHANGE UP (ref 22–31)
CREAT SERPL-MCNC: 0.63 MG/DL — SIGNIFICANT CHANGE UP (ref 0.5–1.3)
CREAT SERPL-MCNC: 0.63 MG/DL — SIGNIFICANT CHANGE UP (ref 0.5–1.3)
CULTURE RESULTS: ABNORMAL
CULTURE RESULTS: ABNORMAL
CULTURE RESULTS: SIGNIFICANT CHANGE UP
CULTURE RESULTS: SIGNIFICANT CHANGE UP
EGFR: 91 ML/MIN/1.73M2 — SIGNIFICANT CHANGE UP
EGFR: 91 ML/MIN/1.73M2 — SIGNIFICANT CHANGE UP
GLUCOSE SERPL-MCNC: 87 MG/DL — SIGNIFICANT CHANGE UP (ref 70–99)
GLUCOSE SERPL-MCNC: 87 MG/DL — SIGNIFICANT CHANGE UP (ref 70–99)
HCT VFR BLD CALC: 33.3 % — LOW (ref 34.5–45)
HCT VFR BLD CALC: 33.3 % — LOW (ref 34.5–45)
HGB BLD-MCNC: 11.1 G/DL — LOW (ref 11.5–15.5)
HGB BLD-MCNC: 11.1 G/DL — LOW (ref 11.5–15.5)
MAGNESIUM SERPL-MCNC: 2 MG/DL — SIGNIFICANT CHANGE UP (ref 1.6–2.6)
MAGNESIUM SERPL-MCNC: 2 MG/DL — SIGNIFICANT CHANGE UP (ref 1.6–2.6)
MCHC RBC-ENTMCNC: 29.3 PG — SIGNIFICANT CHANGE UP (ref 27–34)
MCHC RBC-ENTMCNC: 29.3 PG — SIGNIFICANT CHANGE UP (ref 27–34)
MCHC RBC-ENTMCNC: 33.3 GM/DL — SIGNIFICANT CHANGE UP (ref 32–36)
MCHC RBC-ENTMCNC: 33.3 GM/DL — SIGNIFICANT CHANGE UP (ref 32–36)
MCV RBC AUTO: 87.9 FL — SIGNIFICANT CHANGE UP (ref 80–100)
MCV RBC AUTO: 87.9 FL — SIGNIFICANT CHANGE UP (ref 80–100)
METHOD TYPE: SIGNIFICANT CHANGE UP
NRBC # BLD: 0 /100 WBCS — SIGNIFICANT CHANGE UP (ref 0–0)
NRBC # BLD: 0 /100 WBCS — SIGNIFICANT CHANGE UP (ref 0–0)
NRBC # FLD: 0 K/UL — SIGNIFICANT CHANGE UP (ref 0–0)
NRBC # FLD: 0 K/UL — SIGNIFICANT CHANGE UP (ref 0–0)
ORGANISM # SPEC MICROSCOPIC CNT: ABNORMAL
ORGANISM # SPEC MICROSCOPIC CNT: SIGNIFICANT CHANGE UP
PHOSPHATE SERPL-MCNC: 3.9 MG/DL — SIGNIFICANT CHANGE UP (ref 2.5–4.5)
PHOSPHATE SERPL-MCNC: 3.9 MG/DL — SIGNIFICANT CHANGE UP (ref 2.5–4.5)
PLATELET # BLD AUTO: 171 K/UL — SIGNIFICANT CHANGE UP (ref 150–400)
PLATELET # BLD AUTO: 171 K/UL — SIGNIFICANT CHANGE UP (ref 150–400)
POTASSIUM SERPL-MCNC: 3.8 MMOL/L — SIGNIFICANT CHANGE UP (ref 3.5–5.3)
POTASSIUM SERPL-MCNC: 3.8 MMOL/L — SIGNIFICANT CHANGE UP (ref 3.5–5.3)
POTASSIUM SERPL-SCNC: 3.8 MMOL/L — SIGNIFICANT CHANGE UP (ref 3.5–5.3)
POTASSIUM SERPL-SCNC: 3.8 MMOL/L — SIGNIFICANT CHANGE UP (ref 3.5–5.3)
RBC # BLD: 3.79 M/UL — LOW (ref 3.8–5.2)
RBC # BLD: 3.79 M/UL — LOW (ref 3.8–5.2)
RBC # FLD: 13.3 % — SIGNIFICANT CHANGE UP (ref 10.3–14.5)
RBC # FLD: 13.3 % — SIGNIFICANT CHANGE UP (ref 10.3–14.5)
SODIUM SERPL-SCNC: 143 MMOL/L — SIGNIFICANT CHANGE UP (ref 135–145)
SODIUM SERPL-SCNC: 143 MMOL/L — SIGNIFICANT CHANGE UP (ref 135–145)
SPECIMEN SOURCE: SIGNIFICANT CHANGE UP
WBC # BLD: 2.93 K/UL — LOW (ref 3.8–10.5)
WBC # BLD: 2.93 K/UL — LOW (ref 3.8–10.5)
WBC # FLD AUTO: 2.93 K/UL — LOW (ref 3.8–10.5)
WBC # FLD AUTO: 2.93 K/UL — LOW (ref 3.8–10.5)

## 2023-10-25 PROCEDURE — 99232 SBSQ HOSP IP/OBS MODERATE 35: CPT

## 2023-10-25 PROCEDURE — 93010 ELECTROCARDIOGRAM REPORT: CPT

## 2023-10-25 RX ADMIN — CEFTRIAXONE 100 MILLIGRAM(S): 500 INJECTION, POWDER, FOR SOLUTION INTRAMUSCULAR; INTRAVENOUS at 19:47

## 2023-10-25 RX ADMIN — Medication 1 TABLET(S): at 11:32

## 2023-10-25 RX ADMIN — Medication 1 TABLET(S): at 18:17

## 2023-10-25 RX ADMIN — ENOXAPARIN SODIUM 40 MILLIGRAM(S): 100 INJECTION SUBCUTANEOUS at 13:29

## 2023-10-25 RX ADMIN — Medication 100 MICROGRAM(S): at 06:02

## 2023-10-25 NOTE — PROGRESS NOTE ADULT - ASSESSMENT
This is a 78F with PMHx of enlarged aortic aneurysm, HTN,  thyroid cancer s/p thyroidectomy (2019) presents with fever, chills, nausea. In the ED, found to be febrile 100.7F, without leukocytosis, UA with significant pyuria, UCx with Ecoli, BCx 1/4 GNR PCR for E coli positive.    #E Coli bacteremia   #UTI, possible pyelonephritis   #Fever   #PCN allergy     Overall 78 F presenting with sepsis 2/2 E Coli bacteremia likely from UTI/pyelo. Pt improved on Ceftriaxone, now afebrile. No CVA tenderness on exam. Will follow up sensitivities and plan on 7-10 treatment.  E Coli is eaton S.     Plan:  1. C/w Ceftriaxone 2g q24 for now  2. Obtain EKG, if QTC < 500, can discharge on Ciprofloxacin 500 mg BID to finish 7 day course on 10/29/23    Thank you for this consult. Inpatient ID consult team will sign off.    Further changes in lab values, imaging studies, or clinical status will not be known to ID inpatient consultants unless specifically communicated by primary team.    Doug Cartwright MD  Attending Physician  Division of Infectious Diseases  Department of Medicine    Please contact through MS Teams message.  Office: [phone] (after 5 PM or weekend)

## 2023-10-25 NOTE — PROGRESS NOTE ADULT - SUBJECTIVE AND OBJECTIVE BOX
Cardiovascular Disease Progress Note    Overnight events: No acute events overnight.  no new cardiac sx  Otherwise review of systems negative    Objective Findings:  T(C): 36.4 (10-25-23 @ 05:55), Max: 36.9 (10-24-23 @ 15:17)  HR: 58 (10-25-23 @ 05:55) (58 - 78)  BP: 133/62 (10-25-23 @ 05:55) (111/60 - 134/59)  RR: 17 (10-25-23 @ 05:55) (17 - 18)  SpO2: 98% (10-25-23 @ 05:55) (98% - 100%)  Wt(kg): --  Daily     Daily Weight in k (25 Oct 2023 02:04)      Physical Exam:  Gen: NAD  HEENT: EOMI  CV: RRR, normal S1 + S2, no m/r/g  Lungs: CTAB  Abd: soft, non-tender  Ext: No edema    Telemetry:    Laboratory Data:                        11.1   2.93  )-----------( 171      ( 25 Oct 2023 05:50 )             33.3     10-    143  |  107  |  16  ----------------------------<  87  3.8   |  25  |  0.63    Ca    8.3<L>      25 Oct 2023 05:50  Phos  3.9     10-25  Mg     2.00     10-25                Inpatient Medications:  MEDICATIONS  (STANDING):  cefTRIAXone   IVPB 2000 milliGRAM(s) IV Intermittent every 24 hours  enoxaparin Injectable 40 milliGRAM(s) SubCutaneous every 24 hours  lactobacillus acidophilus 1 Tablet(s) Oral two times a day with meals  levothyroxine 100 MICROGram(s) Oral daily      Assessment:  79yo F pmh enlarged aortic aneurysm, HTN, thyroidectomy (2019) s/p thyroid cancer p/w fever (tmax 104), chills, cough, nausea, dry heaves found to have gram negative jessica bacteremia    Recs:  cardiac stable  denies any ischemic or hf sx  abx per ID  ID consulted, recs appreciated  low concern for IE at present   bp stable off antihypertensives  will follow          Over 25 minutes spent on total encounter; more than 50% of the visit was spent counseling and/or coordinating care by the attending physician.      Braeden Tatum MD   Cardiovascular Disease  [phone]
  Infectious Diseases Follow Up:    Patient is a 78y old  Female who presents with a chief complaint of fevers (25 Oct 2023 07:52)      Interval History/ROS:  Feeling improved, no acute complaints     Allergies  penicillin (Unknown)        ANTIMICROBIALS:  cefTRIAXone   IVPB 2000 every 24 hours      Current Abx:     Previous Abx     OTHER MEDS:  MEDICATIONS  (STANDING):  acetaminophen     Tablet .. 650 every 6 hours PRN  aluminum hydroxide/magnesium hydroxide/simethicone Suspension 30 every 4 hours PRN  enoxaparin Injectable 40 every 24 hours  levothyroxine 100 daily  melatonin 3 at bedtime PRN  ondansetron Injectable 4 every 8 hours PRN      Vital Signs Last 24 Hrs  T(C): 36.7 (25 Oct 2023 09:52), Max: 36.9 (24 Oct 2023 15:17)  T(F): 98 (25 Oct 2023 09:52), Max: 98.4 (24 Oct 2023 15:17)  HR: 63 (25 Oct 2023 09:52) (58 - 78)  BP: 118/59 (25 Oct 2023 09:52) (118/59 - 134/59)  BP(mean): --  RR: 16 (25 Oct 2023 09:52) (16 - 18)  SpO2: 100% (25 Oct 2023 09:52) (98% - 100%)    Parameters below as of 25 Oct 2023 09:52  Patient On (Oxygen Delivery Method): room air        PHYSICAL EXAM:  GENERAL: NAD, well-developed  HEAD:  Atraumatic, Normocephalic  EYES: EOMI, PERRLA, conjunctiva and sclera clear  NECK: Supple, No JVD  CHEST/LUNG: Clear to auscultation bilaterally; No wheeze  HEART: Regular rate and rhythm; No murmurs, rubs, or gallops  ABDOMEN: Soft, Nontender, Nondistended; Bowel sounds present  EXTREMITIES:  2+ Peripheral Pulses, No clubbing, cyanosis, or edema  PSYCH: AAOx3  NEUROLOGY: non-focal  SKIN: No rashes or lesions                          11.1   2.93  )-----------( 171      ( 25 Oct 2023 05:50 )             33.3       10-25    143  |  107  |  16  ----------------------------<  87  3.8   |  25  |  0.63    Ca    8.3<L>      25 Oct 2023 05:50  Phos  3.9     10-25  Mg     2.00     10-25        Urinalysis Basic - ( 25 Oct 2023 05:50 )    Color: x / Appearance: x / SG: x / pH: x  Gluc: 87 mg/dL / Ketone: x  / Bili: x / Urobili: x   Blood: x / Protein: x / Nitrite: x   Leuk Esterase: x / RBC: x / WBC x   Sq Epi: x / Non Sq Epi: x / Bacteria: x        MICROBIOLOGY:  v  Clean Catch Clean Catch (Midstream)  10-22-23   >100,000 CFU/ml Escherichia coli  --  Escherichia coli      .Blood Blood-Peripheral  10-22-23   No growth at 48 Hours  --  --      .Blood Blood-Peripheral  10-22-23   Growth in aerobic bottle: Escherichia coli  Direct identification is available within approximately 3-5  hours either by Blood Panel Multiplexed PCR or Direct  MALDI-TOF. Details: [email or web address]  --  Blood Culture PCR  Escherichia coli          Rapid RVP Result: Chiqui (10-22 @ 18:03)        RADIOLOGY:

## 2023-10-25 NOTE — PROGRESS NOTE ADULT - ASSESSMENT
_________________________________________________________________________________________  ========>>  M E D I C A L   A T T E N D I N G    F O L L O W  U P  N O T E  <<=========  -----------------------------------------------------------------------------------------------------    - Patient seen and examined by me earlier today.   - Patient today overall doing better .. comfortable, eating better, in chair, overall otherwise doing better , no fever / chills     Patient's blood cultures are positive as below    ==================>> REVIEW OF SYSTEM <<=================    GEN: felt feverish before , no chills, no pain, no flank pain   RESP: no SOB, no cough, no sputum  CVS: no chest pain, no palpitations  GI:   My abdominal discomfort, no nausea,    Had one episode of diarrhea Last night  : no dysuria, no frequency, no blood   Neuro: no headache, no dizziness    ==================>> PHYSICAL EXAM <<=================    GEN: A&O X 3 , NAD , comfortable, pleasant, calm , In chair  HEENT: NCAT, PERRL, MMM, hearing intact  CVS: S1S2 , regular , No M/R/G appreciated  PULM: CTA B/L,  no W/R/R appreciated  ABD.: soft. non tender, non distended,  bowel sounds present  Extrem: intact pulses , no edema         ( Note written / Date of service 10-25-23 ( This is certified to be the same as "ENTERED" date above ( for billing purposes)))    ==================>> MEDICATIONS <<====================    cefTRIAXone   IVPB 2000 milliGRAM(s) IV Intermittent every 24 hours  enoxaparin Injectable 40 milliGRAM(s) SubCutaneous every 24 hours  lactobacillus acidophilus 1 Tablet(s) Oral two times a day with meals  levothyroxine 100 MICROGram(s) Oral daily    MEDICATIONS  (PRN):  acetaminophen     Tablet .. 650 milliGRAM(s) Oral every 6 hours PRN Temp greater or equal to 38C (100.4F), Mild Pain (1 - 3)  aluminum hydroxide/magnesium hydroxide/simethicone Suspension 30 milliLiter(s) Oral every 4 hours PRN Dyspepsia  melatonin 3 milliGRAM(s) Oral at bedtime PRN Insomnia  ondansetron Injectable 4 milliGRAM(s) IV Push every 8 hours PRN Nausea and/or Vomiting    ___________  Active diet:  Diet, Regular:   DASH/TLC Sodium & Cholesterol Restricted (DASH)  ___________________    ==================>> VITAL SIGNS <<==================  Height (cm): 162.6  Weight (kg): 52.435  BMI (kg/m2): 19.8  Vital Signs Last 24 HrsT(C): 36.7 (10-25-23 @ 13:27)  T(F): 98 (10-25-23 @ 13:27), Max: 98.4 (10-24-23 @ 17:57)  HR: 63 (10-25-23 @ 13:27) (58 - 78)  BP: 119/57 (10-25-23 @ 13:27)  RR: 16 (10-25-23 @ 13:27) (16 - 18)  SpO2: 100% (10-25-23 @ 13:27) (98% - 100%)      CAPILLARY BLOOD GLUCOSE         ==================>> LAB AND IMAGING <<==================                        11.1   2.93  )-----------( 171      ( 25 Oct 2023 05:50 )             33.3        10-25    143  |  107  |  16  ----------------------------<  87  3.8   |  25  |  0.63    Ca    8.3<L>      25 Oct 2023 05:50  Phos  3.9     10-25  Mg     2.00     10-25      WBC count:   2.93 <<== ,  3.86 <<== ,  5.58 <<== ,  5.13 <<==   Hemoglobin:   11.1 <<==,  11.3 <<==,  10.6 <<==,  11.7 <<==  platelets:  171 <==, 147 <==, 148 <==, 140 <==    Creatinine:  0.63  <<==, 0.63  <<==, 0.75  <<==, 0.75  <<==  Sodium:   143  <==, 143  <==, 142  <==, 142  <==       AST:          39 <== , 44 <==      ALT:        29  <== , 26  <==      AP:        68  <=, 87  <=     Bili:        <0.2  <=, 0.4  <=    ____________________________    M I C R O B I O L O G Y :    Culture - Urine (collected 22 Oct 2023 17:03)  Source: Clean Catch Clean Catch (Midstream)  Final Report (25 Oct 2023 10:25):    >100,000 CFU/ml Escherichia coli  Organism: Escherichia coli (25 Oct 2023 10:25)  Organism: Escherichia coli (25 Oct 2023 10:25)    Sensitivities:      -  Levofloxacin: S <=0.5      -  Tobramycin: S <=2      -  Nitrofurantoin: S <=32 Should not be used to treat pyelonephritis      -  Aztreonam: S <=4      -  Gentamicin: S <=2      -  Cefazolin: S <=2 For uncomplicated UTI with K. pneumoniae, E. coli, or P. mirablis: PETER <=16 is sensitive and PETER >=32 is resistant. This also predicts results for oral agents cefaclor, cefdinir, cefpodoxime, cefprozil, cefuroxime axetil, cephalexin and locarbef for uncomplicated UTI. Note that some isolates may be susceptible to these agents while testing resistant to cefazolin.      -  Cefepime: S <=2      -  Piperacillin/Tazobactam: S <=8      -  Ciprofloxacin: S <=0.25      -  Imipenem: S <=1      -  Ceftriaxone: S <=1      -  Ampicillin: S <=8 These ampicillin results predict results for amoxicillin      Method Type: PETER      -  Meropenem: S <=1      -  Ampicillin/Sulbactam: S <=4/2      -  Cefoxitin: S <=8      -  Cefuroxime: S <=4      -  Amikacin: S <=16      -  Amoxicillin/Clavulanic Acid: S <=8/4      -  Trimethoprim/Sulfamethoxazole: S <=0.5/9.5      -  Ertapenem: S <=0.5    Culture - Blood (collected 22 Oct 2023 16:55)  Source: .Blood Blood-Peripheral  Preliminary Report (24 Oct 2023 19:02):    No growth at 48 Hours    Culture - Blood (collected 22 Oct 2023 16:49)  Source: .Blood Blood-Peripheral  Gram Stain (23 Oct 2023 20:44):    Growth in aerobic bottle: Gram Negative Rods  Final Report (25 Oct 2023 06:48):    Growth in aerobic bottle: Escherichia coli    Direct identification is available within approximately 3-5    hours either by Blood Panel Multiplexed PCR or Direct    MALDI-TOF. Details: [email or web address]  Organism: Blood Culture PCR  Escherichia coli (25 Oct 2023 06:48)  Organism: Escherichia coli (25 Oct 2023 06:48)    Sensitivities:      -  Levofloxacin: S <=0.5      -  Tobramycin: S <=2      -  Aztreonam: S <=4      -  Gentamicin: S <=2      -  Cefazolin: S <=2      -  Cefepime: S <=2      -  Piperacillin/Tazobactam: S <=8      -  Ciprofloxacin: S <=0.25      -  Imipenem: S <=1      -  Ceftriaxone: S <=1      -  Ampicillin: S <=8 These ampicillin results predict results for amoxicillin      Method Type: PETER      -  Meropenem: S <=1      -  Ampicillin/Sulbactam: S <=4/2      -  Cefoxitin: S <=8      -  Amikacin: S <=16      -  Trimethoprim/Sulfamethoxazole: S <=0.5/9.5      -  Ertapenem: S <=0.5  Organism: Blood Culture PCR (25 Oct 2023 06:48)    Sensitivities:      -  Escherichia coli: Detec      Method Type: PCR      ___________________________________________________________________________________  ===============>>  A S S E S S M E N T   A N D   P L A N <<===============  ------------------------------------------------------------------------------------------    · Assessment	  79yo F pmh enlarged aortic aneurysm, HTN, thyroidectomy (2019) s/p thyroid cancer p/w severe sepsis 2/2 pyelonephritis/UTI     Problem/Plan - 1:  ·  Problem: Sepsis due to urinary tract infection / Pyelonephritis, gram-negative bacteremia.   - Infectious disease consultant, appreciated    continue IV antibiotics as per ID    probiotics given diarrhea  -f/u urine and blood cultures  / Sensitivities    Problem/Plan - 2:  ·  Problem: Hypothyroidism.   ·  Plan: -TSH suppressed. Will lower levothyroxine for now however should touch base with outpt endocrinologist regarding goal TSH. TSH target is lower in patients with a history of thyroid cancer so current TSH may actually be at target.    Problem/Plan - 3:  ·  Problem: Thrombocytopenia.   ·  Plan: -likely 2/2 sepsis  -trend.  platelets:  148 <==, 140 <==    Problem/Plan - 4:  ·  Problem: Aortic aneurysm.   ·  Plan: -recent imaging showing stable size  -restart amlodipine when BP tolerates.  - Resume home blood pressure medications as able and monitor    Problem/Plan - 5:  ·  Problem: Need for prophylactic measure.   ·  Plan: dvt ppx: lovenox.    --------------------------------------------  Case discussed with patient, family at bedside, ACP   Education given on findings and plan of care  ___________________________  H. RANCHO Dodge.  Pager: [phone]       _________________________________________________________________________________________  ========>>  M E D I C A L   A T T E N D I N G    F O L L O W  U P  N O T E  <<=========  -----------------------------------------------------------------------------------------------------    - Patient seen and examined by me earlier today.   - Patient today overall doing better .. comfortable, eating better, in chair, overall otherwise doing better , no fever / chills      As per Patient's sons at bedside, patient emulating a lot in the hallway today    ==================>> REVIEW OF SYSTEM <<=================    GEN: felt feverish before , no chills, no pain, Mild bilateral flank pain   RESP: no SOB, no cough, no sputum  CVS: no chest pain, no palpitations  GI:   My abdominal discomfort, no nausea,    Had one episode of diarrhea Last night Again  : no dysuria, no frequency, no blood   Neuro: no headache, no dizziness    ==================>> PHYSICAL EXAM <<=================    GEN: A&O X 3 , NAD , comfortable, pleasant, calm , In chair  HEENT: NCAT, PERRL, MMM, hearing intact  CVS: S1S2 , regular , No M/R/G appreciated  PULM: CTA B/L,  no W/R/R appreciated  ABD.: soft. non tender, non distended,  bowel sounds present  Extrem: intact pulses , no edema         ( Note written / Date of service 10-25-23 ( This is certified to be the same as "ENTERED" date above ( for billing purposes)))    ==================>> MEDICATIONS <<====================    cefTRIAXone   IVPB 2000 milliGRAM(s) IV Intermittent every 24 hours  enoxaparin Injectable 40 milliGRAM(s) SubCutaneous every 24 hours  lactobacillus acidophilus 1 Tablet(s) Oral two times a day with meals  levothyroxine 100 MICROGram(s) Oral daily    MEDICATIONS  (PRN):  acetaminophen     Tablet .. 650 milliGRAM(s) Oral every 6 hours PRN Temp greater or equal to 38C (100.4F), Mild Pain (1 - 3)  aluminum hydroxide/magnesium hydroxide/simethicone Suspension 30 milliLiter(s) Oral every 4 hours PRN Dyspepsia  melatonin 3 milliGRAM(s) Oral at bedtime PRN Insomnia  ondansetron Injectable 4 milliGRAM(s) IV Push every 8 hours PRN Nausea and/or Vomiting    ___________  Active diet:  Diet, Regular:   DASH/TLC Sodium & Cholesterol Restricted (DASH)  ___________________    ==================>> VITAL SIGNS <<==================  Height (cm): 162.6  Weight (kg): 52.435  BMI (kg/m2): 19.8  Vital Signs Last 24 HrsT(C): 36.7 (10-25-23 @ 13:27)  T(F): 98 (10-25-23 @ 13:27), Max: 98.4 (10-24-23 @ 17:57)  HR: 63 (10-25-23 @ 13:27) (58 - 78)  BP: 119/57 (10-25-23 @ 13:27)  RR: 16 (10-25-23 @ 13:27) (16 - 18)  SpO2: 100% (10-25-23 @ 13:27) (98% - 100%)       ==================>> LAB AND IMAGING <<==================                        11.1   2.93  )-----------( 171      ( 25 Oct 2023 05:50 )             33.3        10-25    143  |  107  |  16  ----------------------------<  87  3.8   |  25  |  0.63    Ca    8.3<L>      25 Oct 2023 05:50  Phos  3.9     10-25  Mg     2.00     10-25    WBC count:   2.93 <<== ,  3.86 <<== ,  5.58 <<== ,  5.13 <<==   Hemoglobin:   11.1 <<==,  11.3 <<==,  10.6 <<==,  11.7 <<==  platelets:  171 <==, 147 <==, 148 <==, 140 <==    Creatinine:  0.63  <<==, 0.63  <<==, 0.75  <<==, 0.75  <<==  Sodium:   143  <==, 143  <==, 142  <==, 142  <==       AST:          39 <== , 44 <==      ALT:        29  <== , 26  <==      AP:        68  <=, 87  <=     Bili:        <0.2  <=, 0.4  <=    ____________________________    M I C R O B I O L O G Y :    Culture - Urine (collected 22 Oct 2023 17:03)  Source: Clean Catch Clean Catch (Midstream)  Final Report (25 Oct 2023 10:25):    >100,000 CFU/ml Escherichia coli  Organism: Escherichia coli (25 Oct 2023 10:25)  Organism: Escherichia coli (25 Oct 2023 10:25)    Sensitivities:      -  Levofloxacin: S <=0.5      -  Tobramycin: S <=2      -  Nitrofurantoin: S <=32 Should not be used to treat pyelonephritis      -  Aztreonam: S <=4      -  Gentamicin: S <=2      -  Cefazolin: S <=2 For uncomplicated UTI with K. pneumoniae, E. coli, or P. mirablis: PETER <=16 is sensitive and PETER >=32 is resistant. This also predicts results for oral agents cefaclor, cefdinir, cefpodoxime, cefprozil, cefuroxime axetil, cephalexin and locarbef for uncomplicated UTI. Note that some isolates may be susceptible to these agents while testing resistant to cefazolin.      -  Cefepime: S <=2      -  Piperacillin/Tazobactam: S <=8      -  Ciprofloxacin: S <=0.25      -  Imipenem: S <=1      -  Ceftriaxone: S <=1      -  Ampicillin: S <=8 These ampicillin results predict results for amoxicillin      Method Type: PETER      -  Meropenem: S <=1      -  Ampicillin/Sulbactam: S <=4/2      -  Cefoxitin: S <=8      -  Cefuroxime: S <=4      -  Amikacin: S <=16      -  Amoxicillin/Clavulanic Acid: S <=8/4      -  Trimethoprim/Sulfamethoxazole: S <=0.5/9.5      -  Ertapenem: S <=0.5    Culture - Blood (collected 22 Oct 2023 16:55)  Source: .Blood Blood-Peripheral  Preliminary Report (24 Oct 2023 19:02):    No growth at 48 Hours    Culture - Blood (collected 22 Oct 2023 16:49)  Source: .Blood Blood-Peripheral  Gram Stain (23 Oct 2023 20:44):    Growth in aerobic bottle: Gram Negative Rods  Final Report (25 Oct 2023 06:48):    Growth in aerobic bottle: Escherichia coli    Direct identification is available within approximately 3-5    hours either by Blood Panel Multiplexed PCR or Direct    MALDI-TOF. Details: [email or web address]  Organism: Blood Culture PCR  Escherichia coli (25 Oct 2023 06:48)  Organism: Escherichia coli (25 Oct 2023 06:48)    Sensitivities:      -  Levofloxacin: S <=0.5      -  Tobramycin: S <=2      -  Aztreonam: S <=4      -  Gentamicin: S <=2      -  Cefazolin: S <=2      -  Cefepime: S <=2      -  Piperacillin/Tazobactam: S <=8      -  Ciprofloxacin: S <=0.25      -  Imipenem: S <=1      -  Ceftriaxone: S <=1      -  Ampicillin: S <=8 These ampicillin results predict results for amoxicillin      Method Type: PETER      -  Meropenem: S <=1      -  Ampicillin/Sulbactam: S <=4/2      -  Cefoxitin: S <=8      -  Amikacin: S <=16      -  Trimethoprim/Sulfamethoxazole: S <=0.5/9.5      -  Ertapenem: S <=0.5  Organism: Blood Culture PCR (25 Oct 2023 06:48)    Sensitivities:      -  Escherichia coli: Detec      Method Type: PCR      ___________________________________________________________________________________  ===============>>  A S S E S S M E N T   A N D   P L A N <<===============  ------------------------------------------------------------------------------------------    · Assessment	  79yo F pmh enlarged aortic aneurysm, HTN, thyroidectomy (2019) s/p thyroid cancer p/w severe sepsis 2/2 pyelonephritis/UTI     Problem/Plan - 1:  ·  Problem: Sepsis due to urinary tract infection / Pyelonephritis, gram-negative bacteremia.   - Infectious disease consultant, appreciated    continue IV antibiotics as per ID >> Can switch to oral as recommended by ID possibly tomorrow prior to discharge    probiotics given diarrhea  -f/u urine and blood cultures  / Sensitivities    Problem/Plan - 2:  ·  Problem: Hypothyroidism.   ·  Plan: -TSH suppressed. Will lower levothyroxine for now however should touch base with outpt endocrinologist regarding goal TSH. TSH target is lower in patients with a history of thyroid cancer so current TSH may actually be at target.    Problem/Plan - 3:  ·  Problem: Thrombocytopenia.   ·  Plan: -likely 2/2 sepsis  -trend.   platelets:  171 <==, 147 <==, 148 <==, 140 <==    Problem/Plan - 4:  ·  Problem: Aortic aneurysm.   ·  Plan: -recent imaging showing stable size  -restart amlodipine when BP tolerates.  - Resume home blood pressure medications as able and monitor    Problem/Plan - 5:  ·  Problem: Need for prophylactic measure.   ·  Plan: dvt ppx: lovenox.    Possible discharge coming home tomorrow  --------------------------------------------  Case discussed with patient, family at bedside, ACP   Education given on findings and plan of care  ___________________________  H. RANCHO Dodge.  Pager: [phone]

## 2023-10-26 ENCOUNTER — TRANSCRIPTION ENCOUNTER (OUTPATIENT)
Age: 78
End: 2023-10-26

## 2023-10-26 VITALS
SYSTOLIC BLOOD PRESSURE: 125 MMHG | OXYGEN SATURATION: 100 % | DIASTOLIC BLOOD PRESSURE: 89 MMHG | TEMPERATURE: 98 F | HEART RATE: 67 BPM | RESPIRATION RATE: 18 BRPM

## 2023-10-26 LAB
ANION GAP SERPL CALC-SCNC: 11 MMOL/L — SIGNIFICANT CHANGE UP (ref 7–14)
ANION GAP SERPL CALC-SCNC: 11 MMOL/L — SIGNIFICANT CHANGE UP (ref 7–14)
BUN SERPL-MCNC: 22 MG/DL — SIGNIFICANT CHANGE UP (ref 7–23)
BUN SERPL-MCNC: 22 MG/DL — SIGNIFICANT CHANGE UP (ref 7–23)
CALCIUM SERPL-MCNC: 8.5 MG/DL — SIGNIFICANT CHANGE UP (ref 8.4–10.5)
CALCIUM SERPL-MCNC: 8.5 MG/DL — SIGNIFICANT CHANGE UP (ref 8.4–10.5)
CHLORIDE SERPL-SCNC: 106 MMOL/L — SIGNIFICANT CHANGE UP (ref 98–107)
CHLORIDE SERPL-SCNC: 106 MMOL/L — SIGNIFICANT CHANGE UP (ref 98–107)
CO2 SERPL-SCNC: 25 MMOL/L — SIGNIFICANT CHANGE UP (ref 22–31)
CO2 SERPL-SCNC: 25 MMOL/L — SIGNIFICANT CHANGE UP (ref 22–31)
CREAT SERPL-MCNC: 0.63 MG/DL — SIGNIFICANT CHANGE UP (ref 0.5–1.3)
CREAT SERPL-MCNC: 0.63 MG/DL — SIGNIFICANT CHANGE UP (ref 0.5–1.3)
EGFR: 91 ML/MIN/1.73M2 — SIGNIFICANT CHANGE UP
EGFR: 91 ML/MIN/1.73M2 — SIGNIFICANT CHANGE UP
GLUCOSE SERPL-MCNC: 87 MG/DL — SIGNIFICANT CHANGE UP (ref 70–99)
GLUCOSE SERPL-MCNC: 87 MG/DL — SIGNIFICANT CHANGE UP (ref 70–99)
HCT VFR BLD CALC: 32.8 % — LOW (ref 34.5–45)
HCT VFR BLD CALC: 32.8 % — LOW (ref 34.5–45)
HGB BLD-MCNC: 11 G/DL — LOW (ref 11.5–15.5)
HGB BLD-MCNC: 11 G/DL — LOW (ref 11.5–15.5)
MAGNESIUM SERPL-MCNC: 1.9 MG/DL — SIGNIFICANT CHANGE UP (ref 1.6–2.6)
MAGNESIUM SERPL-MCNC: 1.9 MG/DL — SIGNIFICANT CHANGE UP (ref 1.6–2.6)
MCHC RBC-ENTMCNC: 29.6 PG — SIGNIFICANT CHANGE UP (ref 27–34)
MCHC RBC-ENTMCNC: 29.6 PG — SIGNIFICANT CHANGE UP (ref 27–34)
MCHC RBC-ENTMCNC: 33.5 GM/DL — SIGNIFICANT CHANGE UP (ref 32–36)
MCHC RBC-ENTMCNC: 33.5 GM/DL — SIGNIFICANT CHANGE UP (ref 32–36)
MCV RBC AUTO: 88.2 FL — SIGNIFICANT CHANGE UP (ref 80–100)
MCV RBC AUTO: 88.2 FL — SIGNIFICANT CHANGE UP (ref 80–100)
NRBC # BLD: 0 /100 WBCS — SIGNIFICANT CHANGE UP (ref 0–0)
NRBC # BLD: 0 /100 WBCS — SIGNIFICANT CHANGE UP (ref 0–0)
NRBC # FLD: 0 K/UL — SIGNIFICANT CHANGE UP (ref 0–0)
NRBC # FLD: 0 K/UL — SIGNIFICANT CHANGE UP (ref 0–0)
PHOSPHATE SERPL-MCNC: 3.8 MG/DL — SIGNIFICANT CHANGE UP (ref 2.5–4.5)
PHOSPHATE SERPL-MCNC: 3.8 MG/DL — SIGNIFICANT CHANGE UP (ref 2.5–4.5)
PLATELET # BLD AUTO: 178 K/UL — SIGNIFICANT CHANGE UP (ref 150–400)
PLATELET # BLD AUTO: 178 K/UL — SIGNIFICANT CHANGE UP (ref 150–400)
POTASSIUM SERPL-MCNC: 3.7 MMOL/L — SIGNIFICANT CHANGE UP (ref 3.5–5.3)
POTASSIUM SERPL-MCNC: 3.7 MMOL/L — SIGNIFICANT CHANGE UP (ref 3.5–5.3)
POTASSIUM SERPL-SCNC: 3.7 MMOL/L — SIGNIFICANT CHANGE UP (ref 3.5–5.3)
POTASSIUM SERPL-SCNC: 3.7 MMOL/L — SIGNIFICANT CHANGE UP (ref 3.5–5.3)
RBC # BLD: 3.72 M/UL — LOW (ref 3.8–5.2)
RBC # BLD: 3.72 M/UL — LOW (ref 3.8–5.2)
RBC # FLD: 13.1 % — SIGNIFICANT CHANGE UP (ref 10.3–14.5)
RBC # FLD: 13.1 % — SIGNIFICANT CHANGE UP (ref 10.3–14.5)
SODIUM SERPL-SCNC: 142 MMOL/L — SIGNIFICANT CHANGE UP (ref 135–145)
SODIUM SERPL-SCNC: 142 MMOL/L — SIGNIFICANT CHANGE UP (ref 135–145)
WBC # BLD: 3.03 K/UL — LOW (ref 3.8–10.5)
WBC # BLD: 3.03 K/UL — LOW (ref 3.8–10.5)
WBC # FLD AUTO: 3.03 K/UL — LOW (ref 3.8–10.5)
WBC # FLD AUTO: 3.03 K/UL — LOW (ref 3.8–10.5)

## 2023-10-26 RX ORDER — LACTOBACILLUS ACIDOPHILUS 100MM CELL
1 CAPSULE ORAL
Qty: 42 | Refills: 0
Start: 2023-10-26 | End: 2023-11-08

## 2023-10-26 RX ADMIN — Medication 100 MICROGRAM(S): at 05:27

## 2023-10-26 RX ADMIN — Medication 1 TABLET(S): at 08:28

## 2023-10-26 NOTE — DISCHARGE NOTE NURSING/CASE MANAGEMENT/SOCIAL WORK - NSDCPEFALRISK_GEN_ALL_CORE
For information on Fall & Injury Prevention, visit: [email or web address] OR  [email or web address] OR  https://www.cdc.gov/steadi/patient.html

## 2023-10-26 NOTE — DISCHARGE NOTE PROVIDER - CARE PROVIDER_API CALL
Belem Pantoja  Endocrinology/Metab/Diabetes  [address], [address]  [place] [address]  Phone: [phone]  Fax: [phone]  Established Patient  Scheduled Appointment: 11/09/2023 07:30 AM    Jerrod Rocha  Cardiovascular Disease  [address], [place] [id number]  Phone: [phone]  Fax: [phone]  Established Patient  Follow Up Time: 1 week

## 2023-10-26 NOTE — DISCHARGE NOTE NURSING/CASE MANAGEMENT/SOCIAL WORK - NSDCVIVACCINE_GEN_ALL_CORE_FT
Tdap; 15-Maurice-2022 05:00; Alessia Persaud); [employer]; [id number] (Exp. Date: 11-Mar-2024); IntraMuscular; Deltoid Left.; 0.5 milliLiter(s); VIS (VIS Published: 09-May-2013, VIS Presented: 15-Maurice-2022);

## 2023-10-26 NOTE — CHART NOTE - NSCHARTNOTEFT_GEN_A_CORE
Critical Blood culture result. Blood culture done on 10/22 ( anaerobic bottle) grew gram negative rods. Patient is currently on Rocephin 2 gm IVPB every 24 hours. Consult ID in Am.
EKG noted to have QTc 517, would not discharge on fluoroquinolones.    Can discharge on Bactrim 1 DS tablet q8 until 10/29/23 to finish 7 day course     Discussed with ID pharmacist     Doug Cartwright M.D.  Attending Physician  Division of Infectious Diseases  Department of Medicine    Please contact through MS Teams message.  Office: [phone] (after 5 PM or weekend)
Infectious disease consulted d/w Dr Dodge.     ACP team to follow up recommendations.     Eduard Samaniego PA-C,   Internal Medicine ACP   In house pager #[phone]
Vital Signs Last 24 Hrs  T(C): 36.6 (26 Oct 2023 10:39), Max: 36.7 (26 Oct 2023 01:45)  T(F): 97.9 (26 Oct 2023 10:39), Max: 98 (26 Oct 2023 01:45)  HR: 67 (26 Oct 2023 10:39) (58 - 69)  BP: 125/89 (26 Oct 2023 10:39) (125/89 - 140/58)  BP(mean): --  RR: 18 (26 Oct 2023 10:39) (16 - 18)  SpO2: 100% (26 Oct 2023 10:39) (98% - 100%)    Parameters below as of 26 Oct 2023 10:39  Patient On (Oxygen Delivery Method): room air                          11.0   3.03  )-----------( 178      ( 26 Oct 2023 06:00 )             32.8   10-26    142  |  106  |  22  ----------------------------<  87  3.7   |  25  |  0.63    Ca    8.5      26 Oct 2023 06:00  Phos  3.8     10-26  Mg     1.90     10-26    Lab results, repeat BCX results and case discussed with Dr. Dodge, patient is medically cleared and optimized for discharge today.   QTC prolonged 517ms. Reviewed with ID pharmacist Alvino ([id number])> as per ID Dr. Cartwright will discharge patient on PO Bactrim DS 1tab TID thru 10/29.   All medications were reviewed with attending, and sent to mutually agreed upon pharmacy> continue bactrim per ID, resume home dose synthroid/amlodipine, probiotics.    Discharge instructions discussed with patient and patient's son at bedside, verbalized understanding, patient has thyroid US on 11/9.

## 2023-10-26 NOTE — PROGRESS NOTE ADULT - ASSESSMENT
M E D I C A L   A T T E N D I N G    F O L L O W    U P   N O T E  (10-26-23 )                                     ------------------------------------------------------------------------------------------------    patient evaluated by me, case discussed with team, chart, medications, and physical exam reviewed, labs / tests  and vitals reviewed by me, as bellow.   Patient is stable for discharge today.  patient overall doing well, no fever, no other c/o.. eating well ambulating well... sons at bedside.. want to go home..       med team to finalize antibiotics regimen given increased QTC of 517 on EKG  Patient to follow up with  PMD / cardio / endo ( re synthroid as discussed)   See discharge document for full note.  [Greater than 35 min spent for these services. ]          ( Note written / Date of service 10-26-23 ( This is certified to be the same as "ENTERED" date above ( for billing purposes)))    ==================>> MEDICATIONS <<====================    cefTRIAXone   IVPB 2000 milliGRAM(s) IV Intermittent every 24 hours  enoxaparin Injectable 40 milliGRAM(s) SubCutaneous every 24 hours  lactobacillus acidophilus 1 Tablet(s) Oral two times a day with meals  levothyroxine 100 MICROGram(s) Oral daily    MEDICATIONS  (PRN):  acetaminophen     Tablet .. 650 milliGRAM(s) Oral every 6 hours PRN Temp greater or equal to 38C (100.4F), Mild Pain (1 - 3)  aluminum hydroxide/magnesium hydroxide/simethicone Suspension 30 milliLiter(s) Oral every 4 hours PRN Dyspepsia  melatonin 3 milliGRAM(s) Oral at bedtime PRN Insomnia  ondansetron Injectable 4 milliGRAM(s) IV Push every 8 hours PRN Nausea and/or Vomiting    ___________  Active diet:  Diet, Regular:   DASH/TLC Sodium & Cholesterol Restricted (DASH)  ___________________    ==================>> VITAL SIGNS <<==================    T(C): 36.6 (10-26-23 @ 10:39), Max: 36.7 (10-25-23 @ 13:27)  HR: 67 (10-26-23 @ 10:39) (58 - 69)  BP: 125/89 (10-26-23 @ 10:39) (119/57 - 140/58)  RR: 18 (10-26-23 @ 10:39) (16 - 18)  SpO2: 100% (10-26-23 @ 10:39) (98% - 100%)       I&O's Summary    25 Oct 2023 07:01  -  26 Oct 2023 07:00  --------------------------------------------------------  IN: 1800 mL / OUT: 2050 mL / NET: -250 mL    26 Oct 2023 07:01  -  26 Oct 2023 13:06  --------------------------------------------------------  IN: 0 mL / OUT: 550 mL / NET: -550 mL       ==================>> LAB AND IMAGING <<==================                        11.0   3.03  )-----------( 178      ( 26 Oct 2023 06:00 )             32.8        10-26    142  |  106  |  22  ----------------------------<  87  3.7   |  25  |  0.63    Ca    8.5      26 Oct 2023 06:00  Phos  3.8     10-26  Mg     1.90     10-26    Urinalysis Basic - ( 26 Oct 2023 06:00 )  Color: x / Appearance: x / SG: x / pH: x  Gluc: 87 mg/dL / Ketone: x  / Bili: x / Urobili: x   Blood: x / Protein: x / Nitrite: x   Leuk Esterase: x / RBC: x / WBC x   Sq Epi: x / Non Sq Epi: x / Bacteria: x    TSH:      0.18   (10-22-23)           HgA1C:   (10-23-23)          (10-23-23)      4.9    WBC count:   3.03 <<== ,  2.93 <<== ,  3.86 <<== ,  5.58 <<== ,  5.13 <<==   Hemoglobin:   11.0 <<==,  11.1 <<==,  11.3 <<==,  10.6 <<==,  11.7 <<==  platelets:  178 <==, 171 <==, 147 <==, 148 <==, 140 <==    Creatinine:  0.63  <<==, 0.63  <<==, 0.63  <<==, 0.75  <<==, 0.75  <<==  Sodium:   142  <==, 143  <==, 143  <==, 142  <==, 142  <==       AST:          39 <== , 44 <==      ALT:        29  <== , 26  <==      AP:        68  <=, 87  <=     Bili:        <0.2  <=, 0.4  <=     ____________________________    M I C R O B I O L O G Y :    Culture - Blood (collected 25 Oct 2023 05:50)  Source: .Blood Blood-Peripheral  Preliminary Report (26 Oct 2023 10:01):    No growth at 24 hours    Culture - Blood (collected 25 Oct 2023 05:45)  Source: .Blood Blood-Peripheral  Preliminary Report (26 Oct 2023 10:01):    No growth at 24 hours    Culture - Urine (collected 22 Oct 2023 17:03)  Source: Clean Catch Clean Catch (Midstream)  Final Report (25 Oct 2023 10:25):    >100,000 CFU/ml Escherichia coli  Organism: Escherichia coli (25 Oct 2023 10:25)  Organism: Escherichia coli (25 Oct 2023 10:25)    Sensitivities:      Method Type: PETER      -  Amikacin: S <=16      -  Amoxicillin/Clavulanic Acid: S <=8/4      -  Ampicillin: S <=8 These ampicillin results predict results for amoxicillin      -  Ampicillin/Sulbactam: S <=4/2      -  Aztreonam: S <=4      -  Cefazolin: S <=2 For uncomplicated UTI with K. pneumoniae, E. coli, or P. mirablis: PETER <=16 is sensitive and PETER >=32 is resistant. This also predicts results for oral agents cefaclor, cefdinir, cefpodoxime, cefprozil, cefuroxime axetil, cephalexin and locarbef for uncomplicated UTI. Note that some isolates may be susceptible to these agents while testing resistant to cefazolin.      -  Cefepime: S <=2      -  Cefoxitin: S <=8      -  Ceftriaxone: S <=1      -  Cefuroxime: S <=4      -  Ciprofloxacin: S <=0.25      -  Ertapenem: S <=0.5      -  Gentamicin: S <=2      -  Imipenem: S <=1      -  Levofloxacin: S <=0.5      -  Meropenem: S <=1      -  Nitrofurantoin: S <=32 Should not be used to treat pyelonephritis      -  Piperacillin/Tazobactam: S <=8      -  Tobramycin: S <=2      -  Trimethoprim/Sulfamethoxazole: S <=0.5/9.5    Culture - Blood (collected 22 Oct 2023 16:55)  Source: .Blood Blood-Peripheral  Preliminary Report (25 Oct 2023 19:01):    No growth at 72 Hours    Culture - Blood (collected 22 Oct 2023 16:49)  Source: .Blood Blood-Peripheral  Gram Stain (23 Oct 2023 20:44):    Growth in aerobic bottle: Gram Negative Rods  Final Report (25 Oct 2023 06:48):    Growth in aerobic bottle: Escherichia coli    Direct identification is available within approximately 3-5    hours either by Blood Panel Multiplexed PCR or Direct    MALDI-TOF. Details: [email or web address]  Organism: Blood Culture PCR  Escherichia coli (25 Oct 2023 06:48)  Organism: Escherichia coli (25 Oct 2023 06:48)    Sensitivities:      Method Type: PETER      -  Amikacin: S <=16      -  Ampicillin: S <=8 These ampicillin results predict results for amoxicillin      -  Ampicillin/Sulbactam: S <=4/2      -  Aztreonam: S <=4      -  Cefazolin: S <=2      -  Cefepime: S <=2      -  Cefoxitin: S <=8      -  Ceftriaxone: S <=1      -  Ciprofloxacin: S <=0.25      -  Ertapenem: S <=0.5      -  Gentamicin: S <=2      -  Imipenem: S <=1      -  Levofloxacin: S <=0.5      -  Meropenem: S <=1      -  Piperacillin/Tazobactam: S <=8      -  Tobramycin: S <=2      -  Trimethoprim/Sulfamethoxazole: S <=0.5/9.5  Organism: Blood Culture PCR (25 Oct 2023 06:48)    Sensitivities:      Method Type: PCR      -  Escherichia coli: Detec      SARS-CoV-2: Chiqui (10-22-23 @ 18:03)       ( Note written / Date of service 10-26-23 ( This is certified to be the same as "ENTERED" date above ( for billing Purposes )))

## 2023-10-26 NOTE — DISCHARGE NOTE NURSING/CASE MANAGEMENT/SOCIAL WORK - PATIENT PORTAL LINK FT
You can access the FollowMyHealth Patient Portal offered by [facility] by registering at the following website: [email or web address]. By joining [employer]’s FollowMyHealth portal, you will also be able to view your health information using other applications (apps) compatible with our system.

## 2023-10-26 NOTE — DISCHARGE NOTE PROVIDER - NSDCCPCAREPLAN_GEN_ALL_CORE_FT
PRINCIPAL DISCHARGE DIAGNOSIS  Diagnosis: Acute UTI  Assessment and Plan of Treatment: You were started on IV antibiotics for a urinary infection.  Please complete course till 10/29 as prescribed.   To help prevent future infections maintain healthy hygiene and avoid taking long baths. Wipe from front to back and empty your bladder frequently by keeping yourself properly hydrated. Monitor for signs/symptoms of infection, such as, fever/chills, burning/pain with urination, urinary frequency/hesitancy, cloudy urine, or blood in urine and follow-up with your primary care provider as outpatient for further care      SECONDARY DISCHARGE DIAGNOSES  Diagnosis: Hypothyroidism  Assessment and Plan of Treatment: Followup with Endocrinologist on 11/9 Dr. Pantoja for thyroid ultrasound   resume home synthroid on discharge and repeat thyroid function testing and further medication management    Diagnosis: Aortic aneurysm  Assessment and Plan of Treatment: Recent imaging showing stable size, continue amlodipine    Diagnosis: Sepsis  Assessment and Plan of Treatment: resolved, please complete the course of antibiotics

## 2023-10-26 NOTE — DISCHARGE NOTE PROVIDER - NSDCFUSCHEDAPPT_GEN_ALL_CORE_FT
[facility] Physician [facility]  ULTRASND  [facility]  Scheduled Appointment: 11/09/2023    [facility]  BRSTIM[address]  Scheduled Appointment: 11/21/2023    [facility]  ULTRASND  [facility]  Scheduled Appointment: 11/21/2023     [facility] Physician [facility]  [address]  Scheduled Appointment: 11/21/2023    [facility]  ULTRASND  [place]  Scheduled Appointment: 11/21/2023

## 2023-10-26 NOTE — DISCHARGE NOTE PROVIDER - NSDCCAREPROVSEEN_GEN_ALL_CORE_FT
Dar, Doug Tirado Kayy Grover  Chukwuma Onyebuenyi  User ADM  Ordering Physician  Braeden Tatum  Team [facility] Medicine ACP      [ Greater than 35 min spent for discharge services.   ROGERIO Dodge ]       ( Note written / Date of service is the same as last day of patient stay  in the hospital ( for billing purposes)))

## 2023-10-26 NOTE — DISCHARGE NOTE PROVIDER - NSDCFUADDAPPT_GEN_ALL_CORE_FT
Repeat bloodwork with your PCP within 1-2 weeks (CBC, BMP)    you have an appointment for Thyroid ultrasound on 11/9/23, at [address]   you will also need repeat thyroid function tests with your endocrinologist, and for further synthroid adjustments

## 2023-10-26 NOTE — DISCHARGE NOTE PROVIDER - HOSPITAL COURSE
78 F with PMHx HTN, enlarged aortic aneurysm, thyroidectomy (2019) s/p thyroid cancer p/w severe sepsis 2/2 pyelonephritis/UTI, ID consulted started on IV antibiotics, now with clinical improvement, patient medically cleared for discharge on PO Bactrim as per ID.     Sepsis due to urinary tract infection.   - p/w fevers, tachy with hypotension c/f severe sepsis 2/2 UTI/pyelo  - c/w CTX 2 g    - 10/22 UCx E Coli, 10/22 BCx positive E.coli   - Repeat blood culture 10/25 -ntd  - as per ID discharge on PO bactrim DS 1tab q8h till 10/29 (given prolonged qtc 517ms)    Hypothyroidism   - TSH suppressed. Will lower levothyroxine for now however should touch base with outpt endocrinologist regarding goal TSH. TSH target is lower in patients with a history of thyroid cancer so current TSH may actually be at target.  - followup with Endocrinologist on 11/9 Dr. Pantoja for thyroid ultrasound   - resume home synthroid on discharge     Thrombocytopenia  - likely 2/2 sepsis  - resolved    Aortic aneurysm  - Recent imaging showing stable size  - Restart amlodipine on discharge     Dispo: home     On 10/26/2023, case was discussed with Dr. Dodge, patient is medically cleared and optimized for discharge today. As per ID Dr. Cartwright > discharge on PO Bactrim DS 1tab TID thru 10/29. All medications were reviewed with attending, and sent to mutually agreed upon pharmacy> continue bactrim per ID, resume home synthroid/amlodipine, probiotics.

## 2023-10-26 NOTE — DISCHARGE NOTE PROVIDER - NSDCMRMEDTOKEN_GEN_ALL_CORE_FT
amLODIPine 5 mg oral tablet: 1 tab(s) orally once a day  Bactrim  mg-160 mg oral tablet: 1 tab(s) orally every 8 hours  lactobacillus acidophilus oral capsule: 1 cap(s) orally 3 times a day (with meals)  levothyroxine 112 mcg (0.112 mg) oral tablet: 1 tab(s) orally once a day

## 2023-10-26 NOTE — DISCHARGE NOTE NURSING/CASE MANAGEMENT/SOCIAL WORK - NSDCFUADDAPPT_GEN_ALL_CORE_FT
Repeat bloodwork with your PCP within 1-2 weeks (CBC, BMP)    you have an appointment for Thyroid ultrasound on 11/9/23, at [address]   you will also need repeat thyroid function tests with your endocrinologist, and for further synthroid adjustments

## 2023-10-26 NOTE — DISCHARGE NOTE PROVIDER - PROVIDER TOKENS
PROVIDER:[TOKEN:[[id number]],SCHEDULEDAPPT:[11/09/2023],SCHEDULEDAPPTTIME:[07:30 AM],ESTABLISHEDPATIENT:[T]],PROVIDER:[TOKEN:[[id number]],FOLLOWUP:[1 week],ESTABLISHEDPATIENT:[T]]

## 2023-10-27 LAB
CULTURE RESULTS: SIGNIFICANT CHANGE UP
CULTURE RESULTS: SIGNIFICANT CHANGE UP
SPECIMEN SOURCE: SIGNIFICANT CHANGE UP
SPECIMEN SOURCE: SIGNIFICANT CHANGE UP

## 2023-10-30 LAB
CULTURE RESULTS: SIGNIFICANT CHANGE UP
SPECIMEN SOURCE: SIGNIFICANT CHANGE UP

## 2023-11-07 ENCOUNTER — APPOINTMENT (OUTPATIENT)
Dept: ULTRASOUND IMAGING | Facility: IMAGING CENTER | Age: 78
End: 2023-11-07

## 2023-11-07 ENCOUNTER — OUTPATIENT (OUTPATIENT)
Dept: OUTPATIENT SERVICES | Facility: HOSPITAL | Age: 78
LOS: 1 days | End: 2023-11-07
Payer: MEDICARE

## 2023-11-07 DIAGNOSIS — Z98.890 OTHER SPECIFIED POSTPROCEDURAL STATES: Chronic | ICD-10-CM

## 2023-11-07 DIAGNOSIS — Z98.891 HISTORY OF UTERINE SCAR FROM PREVIOUS SURGERY: Chronic | ICD-10-CM

## 2023-11-07 DIAGNOSIS — Z90.721 ACQUIRED ABSENCE OF OVARIES, UNILATERAL: Chronic | ICD-10-CM

## 2023-11-07 DIAGNOSIS — Z86.39 PERSONAL HISTORY OF OTHER ENDOCRINE, NUTRITIONAL AND METABOLIC DISEASE: Chronic | ICD-10-CM

## 2023-11-07 DIAGNOSIS — Z96.659 PRESENCE OF UNSPECIFIED ARTIFICIAL KNEE JOINT: Chronic | ICD-10-CM

## 2023-11-07 DIAGNOSIS — K82.4 CHOLESTEROLOSIS OF GALLBLADDER: ICD-10-CM

## 2023-11-07 DIAGNOSIS — Z98.49 CATARACT EXTRACTION STATUS, UNSPECIFIED EYE: Chronic | ICD-10-CM

## 2023-11-07 PROCEDURE — 76700 US EXAM ABDOM COMPLETE: CPT | Mod: 26

## 2023-11-07 PROCEDURE — 76536 US EXAM OF HEAD AND NECK: CPT

## 2023-11-07 PROCEDURE — 76536 US EXAM OF HEAD AND NECK: CPT | Mod: 26

## 2023-11-07 PROCEDURE — 76700 US EXAM ABDOM COMPLETE: CPT

## 2023-11-18 ENCOUNTER — OUTPATIENT (OUTPATIENT)
Dept: OUTPATIENT SERVICES | Facility: HOSPITAL | Age: 78
LOS: 1 days | End: 2023-11-18
Payer: MEDICARE

## 2023-11-18 ENCOUNTER — APPOINTMENT (OUTPATIENT)
Dept: CT IMAGING | Facility: IMAGING CENTER | Age: 78
End: 2023-11-18

## 2023-11-18 DIAGNOSIS — Z98.890 OTHER SPECIFIED POSTPROCEDURAL STATES: Chronic | ICD-10-CM

## 2023-11-18 DIAGNOSIS — Z98.891 HISTORY OF UTERINE SCAR FROM PREVIOUS SURGERY: Chronic | ICD-10-CM

## 2023-11-18 DIAGNOSIS — Z90.721 ACQUIRED ABSENCE OF OVARIES, UNILATERAL: Chronic | ICD-10-CM

## 2023-11-18 DIAGNOSIS — Z96.659 PRESENCE OF UNSPECIFIED ARTIFICIAL KNEE JOINT: Chronic | ICD-10-CM

## 2023-11-18 DIAGNOSIS — N39.0 URINARY TRACT INFECTION, SITE NOT SPECIFIED: ICD-10-CM

## 2023-11-18 DIAGNOSIS — Z98.49 CATARACT EXTRACTION STATUS, UNSPECIFIED EYE: Chronic | ICD-10-CM

## 2023-11-18 DIAGNOSIS — Z86.39 PERSONAL HISTORY OF OTHER ENDOCRINE, NUTRITIONAL AND METABOLIC DISEASE: Chronic | ICD-10-CM

## 2023-11-18 PROCEDURE — 74176 CT ABD & PELVIS W/O CONTRAST: CPT | Mod: 26,MH

## 2023-11-18 PROCEDURE — 74176 CT ABD & PELVIS W/O CONTRAST: CPT | Mod: MH

## 2023-11-21 ENCOUNTER — APPOINTMENT (OUTPATIENT)
Dept: ULTRASOUND IMAGING | Facility: IMAGING CENTER | Age: 78
End: 2023-11-21

## 2023-11-21 ENCOUNTER — APPOINTMENT (OUTPATIENT)
Dept: MAMMOGRAPHY | Facility: IMAGING CENTER | Age: 78
End: 2023-11-21

## 2024-03-08 NOTE — H&P ADULT - RESPIRATORY AND THORAX
No protocol for requested medication.    Medication:  DIAZEPAM 5MG TABLETS   Last office visit date: 2/22/24  Pharmacy: [id number] - [place] [address] AT [facility] [address]    Order pended, routed to clinician for review.    details…

## 2024-06-12 ENCOUNTER — APPOINTMENT (OUTPATIENT)
Dept: ULTRASOUND IMAGING | Facility: IMAGING CENTER | Age: 79
End: 2024-06-12
Payer: MEDICARE

## 2024-06-12 ENCOUNTER — OUTPATIENT (OUTPATIENT)
Dept: OUTPATIENT SERVICES | Facility: HOSPITAL | Age: 79
LOS: 1 days | End: 2024-06-12
Payer: MEDICARE

## 2024-06-12 DIAGNOSIS — Z90.721 ACQUIRED ABSENCE OF OVARIES, UNILATERAL: Chronic | ICD-10-CM

## 2024-06-12 DIAGNOSIS — Z86.39 PERSONAL HISTORY OF OTHER ENDOCRINE, NUTRITIONAL AND METABOLIC DISEASE: Chronic | ICD-10-CM

## 2024-06-12 DIAGNOSIS — Z98.891 HISTORY OF UTERINE SCAR FROM PREVIOUS SURGERY: Chronic | ICD-10-CM

## 2024-06-12 DIAGNOSIS — Z98.49 CATARACT EXTRACTION STATUS, UNSPECIFIED EYE: Chronic | ICD-10-CM

## 2024-06-12 DIAGNOSIS — Z98.890 OTHER SPECIFIED POSTPROCEDURAL STATES: Chronic | ICD-10-CM

## 2024-06-12 DIAGNOSIS — Z00.8 ENCOUNTER FOR OTHER GENERAL EXAMINATION: ICD-10-CM

## 2024-06-12 DIAGNOSIS — Z96.659 PRESENCE OF UNSPECIFIED ARTIFICIAL KNEE JOINT: Chronic | ICD-10-CM

## 2024-06-12 PROCEDURE — 76700 US EXAM ABDOM COMPLETE: CPT | Mod: 26

## 2024-06-12 PROCEDURE — 76536 US EXAM OF HEAD AND NECK: CPT | Mod: 26

## 2024-06-12 PROCEDURE — 76536 US EXAM OF HEAD AND NECK: CPT

## 2024-06-12 PROCEDURE — 76700 US EXAM ABDOM COMPLETE: CPT

## 2024-06-13 ENCOUNTER — OUTPATIENT (OUTPATIENT)
Dept: OUTPATIENT SERVICES | Facility: HOSPITAL | Age: 79
LOS: 1 days | End: 2024-06-13
Payer: MEDICARE

## 2024-06-13 ENCOUNTER — APPOINTMENT (OUTPATIENT)
Dept: MRI IMAGING | Facility: IMAGING CENTER | Age: 79
End: 2024-06-13
Payer: MEDICARE

## 2024-06-13 ENCOUNTER — APPOINTMENT (OUTPATIENT)
Dept: CT IMAGING | Facility: IMAGING CENTER | Age: 79
End: 2024-06-13
Payer: MEDICARE

## 2024-06-13 DIAGNOSIS — Z00.8 ENCOUNTER FOR OTHER GENERAL EXAMINATION: ICD-10-CM

## 2024-06-13 DIAGNOSIS — Z90.721 ACQUIRED ABSENCE OF OVARIES, UNILATERAL: Chronic | ICD-10-CM

## 2024-06-13 DIAGNOSIS — Z98.890 OTHER SPECIFIED POSTPROCEDURAL STATES: Chronic | ICD-10-CM

## 2024-06-13 DIAGNOSIS — Z86.39 PERSONAL HISTORY OF OTHER ENDOCRINE, NUTRITIONAL AND METABOLIC DISEASE: Chronic | ICD-10-CM

## 2024-06-13 DIAGNOSIS — Z98.891 HISTORY OF UTERINE SCAR FROM PREVIOUS SURGERY: Chronic | ICD-10-CM

## 2024-06-13 DIAGNOSIS — Z98.49 CATARACT EXTRACTION STATUS, UNSPECIFIED EYE: Chronic | ICD-10-CM

## 2024-06-13 DIAGNOSIS — Z96.659 PRESENCE OF UNSPECIFIED ARTIFICIAL KNEE JOINT: Chronic | ICD-10-CM

## 2024-06-13 PROCEDURE — C8908: CPT

## 2024-06-13 PROCEDURE — 71250 CT THORAX DX C-: CPT | Mod: 26,MH

## 2024-06-13 PROCEDURE — C8937: CPT

## 2024-06-13 PROCEDURE — 77049 MRI BREAST C-+ W/CAD BI: CPT | Mod: 26

## 2024-06-13 PROCEDURE — A9585: CPT

## 2024-06-13 PROCEDURE — 71250 CT THORAX DX C-: CPT

## 2024-06-18 ENCOUNTER — APPOINTMENT (OUTPATIENT)
Dept: NUCLEAR MEDICINE | Facility: IMAGING CENTER | Age: 79
End: 2024-06-18

## 2024-06-22 ENCOUNTER — APPOINTMENT (OUTPATIENT)
Dept: NUCLEAR MEDICINE | Facility: IMAGING CENTER | Age: 79
End: 2024-06-22
Payer: MEDICARE

## 2024-06-22 ENCOUNTER — OUTPATIENT (OUTPATIENT)
Dept: OUTPATIENT SERVICES | Facility: HOSPITAL | Age: 79
LOS: 1 days | End: 2024-06-22
Payer: MEDICARE

## 2024-06-22 DIAGNOSIS — Z98.49 CATARACT EXTRACTION STATUS, UNSPECIFIED EYE: Chronic | ICD-10-CM

## 2024-06-22 DIAGNOSIS — Z00.8 ENCOUNTER FOR OTHER GENERAL EXAMINATION: ICD-10-CM

## 2024-06-22 DIAGNOSIS — Z86.39 PERSONAL HISTORY OF OTHER ENDOCRINE, NUTRITIONAL AND METABOLIC DISEASE: Chronic | ICD-10-CM

## 2024-06-22 DIAGNOSIS — Z96.659 PRESENCE OF UNSPECIFIED ARTIFICIAL KNEE JOINT: Chronic | ICD-10-CM

## 2024-06-22 DIAGNOSIS — Z98.890 OTHER SPECIFIED POSTPROCEDURAL STATES: Chronic | ICD-10-CM

## 2024-06-22 DIAGNOSIS — Z90.721 ACQUIRED ABSENCE OF OVARIES, UNILATERAL: Chronic | ICD-10-CM

## 2024-06-22 DIAGNOSIS — Z98.891 HISTORY OF UTERINE SCAR FROM PREVIOUS SURGERY: Chronic | ICD-10-CM

## 2024-06-22 PROCEDURE — 78815 PET IMAGE W/CT SKULL-THIGH: CPT | Mod: 26,PI,MH

## 2024-06-22 PROCEDURE — A9552: CPT

## 2024-06-22 PROCEDURE — 78815 PET IMAGE W/CT SKULL-THIGH: CPT | Mod: MH

## 2024-07-12 ENCOUNTER — APPOINTMENT (OUTPATIENT)
Dept: GASTROENTEROLOGY | Facility: CLINIC | Age: 79
End: 2024-07-12

## 2024-07-12 DIAGNOSIS — R94.8 ABNORMAL RESULTS OF FUNCTION STUDIES OF OTHER ORGANS AND SYSTEMS: ICD-10-CM

## 2024-07-12 PROCEDURE — 99443: CPT | Mod: 93

## 2024-07-12 RX ORDER — SODIUM PICOSULFATE, MAGNESIUM OXIDE, AND ANHYDROUS CITRIC ACID 12; 3.5; 1 G/175ML; G/175ML; MG/175ML
10-3.5-12 MG-GM LIQUID ORAL
Qty: 1 | Refills: 0 | Status: ACTIVE | COMMUNITY
Start: 2024-07-12 | End: 1900-01-01

## 2024-08-08 ENCOUNTER — APPOINTMENT (OUTPATIENT)
Dept: GASTROENTEROLOGY | Facility: AMBULATORY MEDICAL SERVICES | Age: 79
End: 2024-08-08

## 2024-08-08 PROCEDURE — 45380 COLONOSCOPY AND BIOPSY: CPT

## 2024-08-08 PROCEDURE — 43239 EGD BIOPSY SINGLE/MULTIPLE: CPT | Mod: 59

## 2024-09-02 ENCOUNTER — NON-APPOINTMENT (OUTPATIENT)
Age: 79
End: 2024-09-02

## 2024-09-03 ENCOUNTER — APPOINTMENT (OUTPATIENT)
Dept: GASTROENTEROLOGY | Facility: CLINIC | Age: 79
End: 2024-09-03

## 2024-09-03 VITALS
BODY MASS INDEX: 20.02 KG/M2 | SYSTOLIC BLOOD PRESSURE: 129 MMHG | HEART RATE: 61 BPM | DIASTOLIC BLOOD PRESSURE: 66 MMHG | TEMPERATURE: 97.7 F | HEIGHT: 63 IN | WEIGHT: 113 LBS | RESPIRATION RATE: 17 BRPM | OXYGEN SATURATION: 97 %

## 2024-09-03 PROCEDURE — 99214 OFFICE O/P EST MOD 30 MIN: CPT

## 2024-10-17 ENCOUNTER — OUTPATIENT (OUTPATIENT)
Dept: OUTPATIENT SERVICES | Facility: HOSPITAL | Age: 79
LOS: 1 days | End: 2024-10-17
Payer: MEDICARE

## 2024-10-17 ENCOUNTER — APPOINTMENT (OUTPATIENT)
Dept: NUCLEAR MEDICINE | Facility: IMAGING CENTER | Age: 79
End: 2024-10-17
Payer: MEDICARE

## 2024-10-17 DIAGNOSIS — R59.0 LOCALIZED ENLARGED LYMPH NODES: ICD-10-CM

## 2024-10-17 DIAGNOSIS — Z98.890 OTHER SPECIFIED POSTPROCEDURAL STATES: Chronic | ICD-10-CM

## 2024-10-17 DIAGNOSIS — Z90.721 ACQUIRED ABSENCE OF OVARIES, UNILATERAL: Chronic | ICD-10-CM

## 2024-10-17 DIAGNOSIS — Z98.49 CATARACT EXTRACTION STATUS, UNSPECIFIED EYE: Chronic | ICD-10-CM

## 2024-10-17 DIAGNOSIS — C73 MALIGNANT NEOPLASM OF THYROID GLAND: ICD-10-CM

## 2024-10-17 DIAGNOSIS — Z96.659 PRESENCE OF UNSPECIFIED ARTIFICIAL KNEE JOINT: Chronic | ICD-10-CM

## 2024-10-17 DIAGNOSIS — Z86.39 PERSONAL HISTORY OF OTHER ENDOCRINE, NUTRITIONAL AND METABOLIC DISEASE: Chronic | ICD-10-CM

## 2024-10-17 DIAGNOSIS — Z98.891 HISTORY OF UTERINE SCAR FROM PREVIOUS SURGERY: Chronic | ICD-10-CM

## 2024-10-17 DIAGNOSIS — Z85.850 PERSONAL HISTORY OF MALIGNANT NEOPLASM OF THYROID: ICD-10-CM

## 2024-10-17 PROCEDURE — 78815 PET IMAGE W/CT SKULL-THIGH: CPT | Mod: 26,PS,MH

## 2024-11-20 PROCEDURE — A9552: CPT

## 2024-11-20 PROCEDURE — 78815 PET IMAGE W/CT SKULL-THIGH: CPT | Mod: MH

## 2025-03-12 ENCOUNTER — NON-APPOINTMENT (OUTPATIENT)
Age: 80
End: 2025-03-12

## 2025-03-12 ENCOUNTER — APPOINTMENT (OUTPATIENT)
Dept: GASTROENTEROLOGY | Facility: CLINIC | Age: 80
End: 2025-03-12
Payer: MEDICARE

## 2025-03-12 VITALS
HEIGHT: 63 IN | TEMPERATURE: 98.2 F | OXYGEN SATURATION: 98 % | SYSTOLIC BLOOD PRESSURE: 132 MMHG | WEIGHT: 115 LBS | BODY MASS INDEX: 20.38 KG/M2 | DIASTOLIC BLOOD PRESSURE: 80 MMHG | HEART RATE: 56 BPM

## 2025-03-12 DIAGNOSIS — D12.6 BENIGN NEOPLASM OF COLON, UNSPECIFIED: ICD-10-CM

## 2025-03-12 DIAGNOSIS — N39.0 URINARY TRACT INFECTION, SITE NOT SPECIFIED: ICD-10-CM

## 2025-03-12 DIAGNOSIS — R94.8 ABNORMAL RESULTS OF FUNCTION STUDIES OF OTHER ORGANS AND SYSTEMS: ICD-10-CM

## 2025-03-12 DIAGNOSIS — R19.5 OTHER FECAL ABNORMALITIES: ICD-10-CM

## 2025-03-12 PROCEDURE — 99214 OFFICE O/P EST MOD 30 MIN: CPT

## 2025-03-12 RX ORDER — WHEAT DEXTRIN 3 G/4 G
POWDER (GRAM) ORAL
Qty: 1 | Refills: 0 | Status: ACTIVE | OUTPATIENT
Start: 2025-03-12

## 2025-03-12 RX ORDER — AMLODIPINE BESYLATE 5 MG/1
5 TABLET ORAL
Refills: 0 | Status: ACTIVE | COMMUNITY

## 2025-03-12 RX ORDER — MULTIVIT WITH MIN/ALA/HERBS 50 MG
TABLET ORAL
Refills: 0 | Status: ACTIVE | COMMUNITY

## 2025-03-12 RX ORDER — LACTASE 9000 UNIT
9000 TABLET ORAL
Qty: 90 | Refills: 5 | Status: ACTIVE | OUTPATIENT
Start: 2025-03-12

## 2025-03-12 RX ORDER — LEVOTHYROXINE SODIUM 100 UG/1
100 TABLET ORAL
Refills: 0 | Status: ACTIVE | COMMUNITY

## 2025-05-29 ENCOUNTER — APPOINTMENT (OUTPATIENT)
Dept: ORTHOPEDIC SURGERY | Facility: CLINIC | Age: 80
End: 2025-05-29
Payer: MEDICARE

## 2025-05-29 VITALS — HEIGHT: 64 IN | WEIGHT: 119 LBS | BODY MASS INDEX: 20.32 KG/M2

## 2025-05-29 DIAGNOSIS — M19.011 PRIMARY OSTEOARTHRITIS, RIGHT SHOULDER: ICD-10-CM

## 2025-05-29 PROCEDURE — 99203 OFFICE O/P NEW LOW 30 MIN: CPT

## 2025-05-29 PROCEDURE — 73030 X-RAY EXAM OF SHOULDER: CPT | Mod: RT

## 2025-05-30 PROBLEM — M19.011 PRIMARY OSTEOARTHRITIS OF RIGHT SHOULDER: Status: ACTIVE | Noted: 2025-05-30

## 2025-06-11 ENCOUNTER — APPOINTMENT (OUTPATIENT)
Dept: ORTHOPEDIC SURGERY | Facility: CLINIC | Age: 80
End: 2025-06-11
Payer: MEDICARE

## 2025-06-11 VITALS — WEIGHT: 118 LBS | HEIGHT: 64 IN | BODY MASS INDEX: 20.14 KG/M2

## 2025-06-11 PROBLEM — M54.2 CERVICAL PAIN: Status: ACTIVE | Noted: 2025-06-11

## 2025-06-11 PROCEDURE — 99214 OFFICE O/P EST MOD 30 MIN: CPT

## 2025-06-11 PROCEDURE — 72040 X-RAY EXAM NECK SPINE 2-3 VW: CPT

## 2025-06-11 RX ORDER — METHYLPREDNISOLONE 4 MG/1
4 TABLET ORAL
Qty: 1 | Refills: 0 | Status: ACTIVE | COMMUNITY
Start: 2025-06-11 | End: 1900-01-01

## 2025-06-12 ENCOUNTER — APPOINTMENT (OUTPATIENT)
Dept: MRI IMAGING | Facility: IMAGING CENTER | Age: 80
End: 2025-06-12
Payer: MEDICARE

## 2025-06-12 ENCOUNTER — OUTPATIENT (OUTPATIENT)
Dept: OUTPATIENT SERVICES | Facility: HOSPITAL | Age: 80
LOS: 1 days | End: 2025-06-12
Payer: MEDICARE

## 2025-06-12 DIAGNOSIS — Z90.721 ACQUIRED ABSENCE OF OVARIES, UNILATERAL: Chronic | ICD-10-CM

## 2025-06-12 DIAGNOSIS — Z98.890 OTHER SPECIFIED POSTPROCEDURAL STATES: Chronic | ICD-10-CM

## 2025-06-12 DIAGNOSIS — Z98.891 HISTORY OF UTERINE SCAR FROM PREVIOUS SURGERY: Chronic | ICD-10-CM

## 2025-06-12 DIAGNOSIS — Z86.39 PERSONAL HISTORY OF OTHER ENDOCRINE, NUTRITIONAL AND METABOLIC DISEASE: Chronic | ICD-10-CM

## 2025-06-12 DIAGNOSIS — Z96.659 PRESENCE OF UNSPECIFIED ARTIFICIAL KNEE JOINT: Chronic | ICD-10-CM

## 2025-06-12 DIAGNOSIS — Z00.00 ENCOUNTER FOR GENERAL ADULT MEDICAL EXAMINATION WITHOUT ABNORMAL FINDINGS: ICD-10-CM

## 2025-06-12 DIAGNOSIS — Z98.49 CATARACT EXTRACTION STATUS, UNSPECIFIED EYE: Chronic | ICD-10-CM

## 2025-06-12 PROCEDURE — 72141 MRI NECK SPINE W/O DYE: CPT | Mod: 26

## 2025-06-12 PROCEDURE — 72141 MRI NECK SPINE W/O DYE: CPT

## 2025-06-20 ENCOUNTER — APPOINTMENT (OUTPATIENT)
Dept: ORTHOPEDIC SURGERY | Facility: CLINIC | Age: 80
End: 2025-06-20
Payer: MEDICARE

## 2025-06-20 VITALS — HEIGHT: 64 IN | BODY MASS INDEX: 19.97 KG/M2 | WEIGHT: 117 LBS

## 2025-06-20 PROBLEM — M48.02 CERVICAL STENOSIS OF SPINE: Status: ACTIVE | Noted: 2025-06-20

## 2025-06-20 PROBLEM — M54.12 CERVICAL RADICULOPATHY: Status: ACTIVE | Noted: 2025-06-11

## 2025-06-20 PROCEDURE — 99214 OFFICE O/P EST MOD 30 MIN: CPT

## 2025-06-30 ENCOUNTER — APPOINTMENT (OUTPATIENT)
Dept: CT IMAGING | Facility: IMAGING CENTER | Age: 80
End: 2025-06-30
Payer: MEDICARE

## 2025-06-30 ENCOUNTER — APPOINTMENT (OUTPATIENT)
Dept: NUCLEAR MEDICINE | Facility: IMAGING CENTER | Age: 80
End: 2025-06-30
Payer: MEDICARE

## 2025-06-30 ENCOUNTER — OUTPATIENT (OUTPATIENT)
Dept: OUTPATIENT SERVICES | Facility: HOSPITAL | Age: 80
LOS: 1 days | End: 2025-06-30
Payer: MEDICARE

## 2025-06-30 ENCOUNTER — APPOINTMENT (OUTPATIENT)
Dept: ULTRASOUND IMAGING | Facility: IMAGING CENTER | Age: 80
End: 2025-06-30
Payer: MEDICARE

## 2025-06-30 DIAGNOSIS — Z98.890 OTHER SPECIFIED POSTPROCEDURAL STATES: Chronic | ICD-10-CM

## 2025-06-30 DIAGNOSIS — Z98.49 CATARACT EXTRACTION STATUS, UNSPECIFIED EYE: Chronic | ICD-10-CM

## 2025-06-30 DIAGNOSIS — Z98.891 HISTORY OF UTERINE SCAR FROM PREVIOUS SURGERY: Chronic | ICD-10-CM

## 2025-06-30 DIAGNOSIS — Z90.721 ACQUIRED ABSENCE OF OVARIES, UNILATERAL: Chronic | ICD-10-CM

## 2025-06-30 DIAGNOSIS — Z86.39 PERSONAL HISTORY OF OTHER ENDOCRINE, NUTRITIONAL AND METABOLIC DISEASE: Chronic | ICD-10-CM

## 2025-06-30 DIAGNOSIS — Z00.8 ENCOUNTER FOR OTHER GENERAL EXAMINATION: ICD-10-CM

## 2025-06-30 DIAGNOSIS — Z96.659 PRESENCE OF UNSPECIFIED ARTIFICIAL KNEE JOINT: Chronic | ICD-10-CM

## 2025-06-30 PROCEDURE — 76700 US EXAM ABDOM COMPLETE: CPT | Mod: 26

## 2025-06-30 PROCEDURE — 71250 CT THORAX DX C-: CPT | Mod: 26

## 2025-06-30 PROCEDURE — 71250 CT THORAX DX C-: CPT | Mod: MH

## 2025-06-30 PROCEDURE — 76700 US EXAM ABDOM COMPLETE: CPT

## 2025-08-14 ENCOUNTER — OUTPATIENT (OUTPATIENT)
Dept: OUTPATIENT SERVICES | Facility: HOSPITAL | Age: 80
LOS: 1 days | End: 2025-08-14
Payer: MEDICARE

## 2025-08-14 ENCOUNTER — APPOINTMENT (OUTPATIENT)
Dept: MRI IMAGING | Facility: IMAGING CENTER | Age: 80
End: 2025-08-14
Payer: MEDICARE

## 2025-08-14 DIAGNOSIS — Z90.721 ACQUIRED ABSENCE OF OVARIES, UNILATERAL: Chronic | ICD-10-CM

## 2025-08-14 DIAGNOSIS — Z98.49 CATARACT EXTRACTION STATUS, UNSPECIFIED EYE: Chronic | ICD-10-CM

## 2025-08-14 DIAGNOSIS — Z00.8 ENCOUNTER FOR OTHER GENERAL EXAMINATION: ICD-10-CM

## 2025-08-14 DIAGNOSIS — Z98.890 OTHER SPECIFIED POSTPROCEDURAL STATES: Chronic | ICD-10-CM

## 2025-08-14 DIAGNOSIS — Z96.659 PRESENCE OF UNSPECIFIED ARTIFICIAL KNEE JOINT: Chronic | ICD-10-CM

## 2025-08-14 DIAGNOSIS — Z98.891 HISTORY OF UTERINE SCAR FROM PREVIOUS SURGERY: Chronic | ICD-10-CM

## 2025-08-14 DIAGNOSIS — Z86.39 PERSONAL HISTORY OF OTHER ENDOCRINE, NUTRITIONAL AND METABOLIC DISEASE: Chronic | ICD-10-CM

## 2025-08-14 PROCEDURE — A9585: CPT

## 2025-08-14 PROCEDURE — 77049 MRI BREAST C-+ W/CAD BI: CPT | Mod: 26

## 2025-08-14 PROCEDURE — C8908: CPT

## 2025-08-14 PROCEDURE — C8937: CPT

## 2025-08-18 ENCOUNTER — APPOINTMENT (OUTPATIENT)
Dept: NUCLEAR MEDICINE | Facility: IMAGING CENTER | Age: 80
End: 2025-08-18

## 2025-08-19 ENCOUNTER — APPOINTMENT (OUTPATIENT)
Dept: NUCLEAR MEDICINE | Facility: IMAGING CENTER | Age: 80
End: 2025-08-19

## 2025-08-20 ENCOUNTER — APPOINTMENT (OUTPATIENT)
Dept: NUCLEAR MEDICINE | Facility: IMAGING CENTER | Age: 80
End: 2025-08-20
Payer: MEDICARE

## 2025-08-20 ENCOUNTER — OUTPATIENT (OUTPATIENT)
Dept: OUTPATIENT SERVICES | Facility: HOSPITAL | Age: 80
LOS: 1 days | End: 2025-08-20
Payer: MEDICARE

## 2025-08-20 DIAGNOSIS — C73 MALIGNANT NEOPLASM OF THYROID GLAND: ICD-10-CM

## 2025-08-20 DIAGNOSIS — Z98.891 HISTORY OF UTERINE SCAR FROM PREVIOUS SURGERY: Chronic | ICD-10-CM

## 2025-08-20 DIAGNOSIS — Z98.890 OTHER SPECIFIED POSTPROCEDURAL STATES: Chronic | ICD-10-CM

## 2025-08-20 DIAGNOSIS — Z90.721 ACQUIRED ABSENCE OF OVARIES, UNILATERAL: Chronic | ICD-10-CM

## 2025-08-20 DIAGNOSIS — Z98.49 CATARACT EXTRACTION STATUS, UNSPECIFIED EYE: Chronic | ICD-10-CM

## 2025-08-20 DIAGNOSIS — Z85.850 PERSONAL HISTORY OF MALIGNANT NEOPLASM OF THYROID: ICD-10-CM

## 2025-08-20 DIAGNOSIS — Z96.659 PRESENCE OF UNSPECIFIED ARTIFICIAL KNEE JOINT: Chronic | ICD-10-CM

## 2025-08-20 DIAGNOSIS — R59.0 LOCALIZED ENLARGED LYMPH NODES: ICD-10-CM

## 2025-08-20 DIAGNOSIS — Z86.39 PERSONAL HISTORY OF OTHER ENDOCRINE, NUTRITIONAL AND METABOLIC DISEASE: Chronic | ICD-10-CM

## 2025-08-20 PROCEDURE — 78815 PET IMAGE W/CT SKULL-THIGH: CPT | Mod: 26,PS

## 2025-08-20 PROCEDURE — 78815 PET IMAGE W/CT SKULL-THIGH: CPT | Mod: MH

## 2025-08-20 PROCEDURE — A9552: CPT

## 2025-08-22 ENCOUNTER — APPOINTMENT (OUTPATIENT)
Dept: NUCLEAR MEDICINE | Facility: IMAGING CENTER | Age: 80
End: 2025-08-22

## 2025-09-04 ENCOUNTER — APPOINTMENT (OUTPATIENT)
Dept: ULTRASOUND IMAGING | Facility: IMAGING CENTER | Age: 80
End: 2025-09-04
Payer: MEDICARE

## 2025-09-04 ENCOUNTER — OUTPATIENT (OUTPATIENT)
Dept: OUTPATIENT SERVICES | Facility: HOSPITAL | Age: 80
LOS: 1 days | End: 2025-09-04
Payer: MEDICARE

## 2025-09-04 DIAGNOSIS — Z90.721 ACQUIRED ABSENCE OF OVARIES, UNILATERAL: Chronic | ICD-10-CM

## 2025-09-04 DIAGNOSIS — Z96.659 PRESENCE OF UNSPECIFIED ARTIFICIAL KNEE JOINT: Chronic | ICD-10-CM

## 2025-09-04 DIAGNOSIS — C73 MALIGNANT NEOPLASM OF THYROID GLAND: ICD-10-CM

## 2025-09-04 DIAGNOSIS — Z86.39 PERSONAL HISTORY OF OTHER ENDOCRINE, NUTRITIONAL AND METABOLIC DISEASE: Chronic | ICD-10-CM

## 2025-09-04 DIAGNOSIS — Z98.49 CATARACT EXTRACTION STATUS, UNSPECIFIED EYE: Chronic | ICD-10-CM

## 2025-09-04 DIAGNOSIS — Z98.890 OTHER SPECIFIED POSTPROCEDURAL STATES: Chronic | ICD-10-CM

## 2025-09-04 PROCEDURE — 76536 US EXAM OF HEAD AND NECK: CPT | Mod: 26

## 2025-09-04 PROCEDURE — 76536 US EXAM OF HEAD AND NECK: CPT

## 2025-09-10 ENCOUNTER — APPOINTMENT (OUTPATIENT)
Dept: GASTROENTEROLOGY | Facility: CLINIC | Age: 80
End: 2025-09-10

## 2025-09-10 ENCOUNTER — APPOINTMENT (OUTPATIENT)
Dept: GASTROENTEROLOGY | Facility: CLINIC | Age: 80
End: 2025-09-10
Payer: MEDICARE

## 2025-09-10 VITALS
HEIGHT: 64 IN | SYSTOLIC BLOOD PRESSURE: 150 MMHG | BODY MASS INDEX: 19.97 KG/M2 | HEART RATE: 61 BPM | DIASTOLIC BLOOD PRESSURE: 79 MMHG | WEIGHT: 117 LBS | OXYGEN SATURATION: 97 % | TEMPERATURE: 98.2 F

## 2025-09-10 DIAGNOSIS — Z86.0100 PERSONAL HISTORY OF COLON POLYPS, UNSPECIFIED: ICD-10-CM

## 2025-09-10 DIAGNOSIS — K20.90 ESOPHAGITIS, UNSPECIFIED WITHOUT BLEEDING: ICD-10-CM

## 2025-09-10 DIAGNOSIS — R94.8 ABNORMAL RESULTS OF FUNCTION STUDIES OF OTHER ORGANS AND SYSTEMS: ICD-10-CM

## 2025-09-10 PROCEDURE — 99214 OFFICE O/P EST MOD 30 MIN: CPT

## 2025-09-10 RX ORDER — PANTOPRAZOLE 40 MG/1
40 TABLET, DELAYED RELEASE ORAL DAILY
Qty: 30 | Refills: 3 | Status: ACTIVE | COMMUNITY
Start: 2025-09-10 | End: 1900-01-01

## 2025-09-10 RX ORDER — AMLODIPINE BESYLATE 5 MG/1
5 TABLET ORAL
Refills: 0 | Status: ACTIVE | COMMUNITY

## (undated) DEVICE — BASIN EMESIS 10IN GRADUATED MAUVE

## (undated) DEVICE — TUBING IV SET GRAVITY 3Y 100" MACRO

## (undated) DEVICE — CONTAINER FORMALIN 80ML YELLOW

## (undated) DEVICE — PACK IV START WITH CHG

## (undated) DEVICE — SALIVA EJECTOR (BLUE)

## (undated) DEVICE — ELCTR ECG CONDUCTIVE ADHESIVE

## (undated) DEVICE — DENTURE CUP PINK

## (undated) DEVICE — TUBING SUCTION NONCONDUCTIVE 6MM X 12FT

## (undated) DEVICE — DRSG BANDAID 0.75X3"

## (undated) DEVICE — BIOPSY FORCEP COLD DISP

## (undated) DEVICE — CATH IV SAFE BC 22G X 1" (BLUE)

## (undated) DEVICE — DRSG CURITY GAUZE SPONGE 4 X 4" 12-PLY NON-STERILE

## (undated) DEVICE — BITE BLOCK ADULT 20 X 27MM (GREEN)

## (undated) DEVICE — ELCTR GROUNDING PAD ADULT COVIDIEN

## (undated) DEVICE — LUBRICATING JELLY HR ONE SHOT 3G

## (undated) DEVICE — BIOPSY FORCEP RADIAL JAW 4 STANDARD WITH NEEDLE

## (undated) DEVICE — GOWN LG

## (undated) DEVICE — DRSG 2X2

## (undated) DEVICE — LINE BREATHE SAMPLNG

## (undated) DEVICE — UNDERPAD LINEN SAVER 17 X 24"

## (undated) DEVICE — TUBING MEDI-VAC W MAXIGRIP CONNECTORS 1/4"X6'

## (undated) DEVICE — CLAMP BX HOT RAD JAW 3